# Patient Record
Sex: FEMALE | Race: WHITE | NOT HISPANIC OR LATINO | Employment: UNEMPLOYED | URBAN - METROPOLITAN AREA
[De-identification: names, ages, dates, MRNs, and addresses within clinical notes are randomized per-mention and may not be internally consistent; named-entity substitution may affect disease eponyms.]

---

## 2017-02-06 ENCOUNTER — ALLSCRIPTS OFFICE VISIT (OUTPATIENT)
Dept: OTHER | Facility: OTHER | Age: 62
End: 2017-02-06

## 2017-03-09 ENCOUNTER — APPOINTMENT (OUTPATIENT)
Dept: LAB | Facility: MEDICAL CENTER | Age: 62
End: 2017-03-09
Payer: COMMERCIAL

## 2017-03-09 DIAGNOSIS — F31.78 BIPOLAR DISORDER, IN FULL REMISSION, MOST RECENT EPISODE MIXED (HCC): ICD-10-CM

## 2017-03-09 DIAGNOSIS — Z79.899 OTHER LONG TERM (CURRENT) DRUG THERAPY: ICD-10-CM

## 2017-03-09 LAB
ALBUMIN SERPL BCP-MCNC: 3.6 G/DL (ref 3.5–5)
ALP SERPL-CCNC: 57 U/L (ref 46–116)
ALT SERPL W P-5'-P-CCNC: 23 U/L (ref 12–78)
ANION GAP SERPL CALCULATED.3IONS-SCNC: 8 MMOL/L (ref 4–13)
AST SERPL W P-5'-P-CCNC: 19 U/L (ref 5–45)
BASOPHILS # BLD AUTO: 0.05 THOUSANDS/ΜL (ref 0–0.1)
BASOPHILS NFR BLD AUTO: 1 % (ref 0–1)
BILIRUB SERPL-MCNC: 0.89 MG/DL (ref 0.2–1)
BUN SERPL-MCNC: 17 MG/DL (ref 5–25)
CALCIUM SERPL-MCNC: 8.7 MG/DL (ref 8.3–10.1)
CHLORIDE SERPL-SCNC: 100 MMOL/L (ref 100–108)
CHOLEST SERPL-MCNC: 162 MG/DL (ref 50–200)
CO2 SERPL-SCNC: 30 MMOL/L (ref 21–32)
CREAT SERPL-MCNC: 0.73 MG/DL (ref 0.6–1.3)
EOSINOPHIL # BLD AUTO: 0.16 THOUSAND/ΜL (ref 0–0.61)
EOSINOPHIL NFR BLD AUTO: 2 % (ref 0–6)
ERYTHROCYTE [DISTWIDTH] IN BLOOD BY AUTOMATED COUNT: 14.9 % (ref 11.6–15.1)
GFR SERPL CREATININE-BSD FRML MDRD: >60 ML/MIN/1.73SQ M
GLUCOSE SERPL-MCNC: 80 MG/DL (ref 65–140)
HCT VFR BLD AUTO: 39.1 % (ref 34.8–46.1)
HDLC SERPL-MCNC: 46 MG/DL (ref 40–60)
HGB BLD-MCNC: 12.5 G/DL (ref 11.5–15.4)
LDLC SERPL CALC-MCNC: 98 MG/DL (ref 0–100)
LYMPHOCYTES # BLD AUTO: 2.2 THOUSANDS/ΜL (ref 0.6–4.47)
LYMPHOCYTES NFR BLD AUTO: 32 % (ref 14–44)
MCH RBC QN AUTO: 27.4 PG (ref 26.8–34.3)
MCHC RBC AUTO-ENTMCNC: 32 G/DL (ref 31.4–37.4)
MCV RBC AUTO: 86 FL (ref 82–98)
MONOCYTES # BLD AUTO: 0.54 THOUSAND/ΜL (ref 0.17–1.22)
MONOCYTES NFR BLD AUTO: 8 % (ref 4–12)
NEUTROPHILS # BLD AUTO: 3.97 THOUSANDS/ΜL (ref 1.85–7.62)
NEUTS SEG NFR BLD AUTO: 57 % (ref 43–75)
NRBC BLD AUTO-RTO: 0 /100 WBCS
PLATELET # BLD AUTO: 242 THOUSANDS/UL (ref 149–390)
PMV BLD AUTO: 11.8 FL (ref 8.9–12.7)
POTASSIUM SERPL-SCNC: 3.9 MMOL/L (ref 3.5–5.3)
PROT SERPL-MCNC: 7.2 G/DL (ref 6.4–8.2)
RBC # BLD AUTO: 4.57 MILLION/UL (ref 3.81–5.12)
SODIUM SERPL-SCNC: 138 MMOL/L (ref 136–145)
TRIGL SERPL-MCNC: 89 MG/DL
VALPROATE SERPL-MCNC: 62 UG/ML (ref 50–100)
WBC # BLD AUTO: 6.93 THOUSAND/UL (ref 4.31–10.16)

## 2017-03-09 PROCEDURE — 85025 COMPLETE CBC W/AUTO DIFF WBC: CPT

## 2017-03-09 PROCEDURE — 36415 COLL VENOUS BLD VENIPUNCTURE: CPT

## 2017-03-09 PROCEDURE — 80053 COMPREHEN METABOLIC PANEL: CPT

## 2017-03-09 PROCEDURE — 80061 LIPID PANEL: CPT

## 2017-03-09 PROCEDURE — 80164 ASSAY DIPROPYLACETIC ACD TOT: CPT

## 2017-05-16 ENCOUNTER — ALLSCRIPTS OFFICE VISIT (OUTPATIENT)
Dept: OTHER | Facility: OTHER | Age: 62
End: 2017-05-16

## 2017-09-09 DIAGNOSIS — F31.78 BIPOLAR DISORDER, IN FULL REMISSION, MOST RECENT EPISODE MIXED (HCC): ICD-10-CM

## 2017-09-09 DIAGNOSIS — Z79.899 OTHER LONG TERM (CURRENT) DRUG THERAPY: ICD-10-CM

## 2017-09-26 ENCOUNTER — APPOINTMENT (OUTPATIENT)
Dept: LAB | Facility: MEDICAL CENTER | Age: 62
End: 2017-09-26
Payer: COMMERCIAL

## 2017-09-26 ENCOUNTER — TRANSCRIBE ORDERS (OUTPATIENT)
Dept: RADIOLOGY | Facility: MEDICAL CENTER | Age: 62
End: 2017-09-26

## 2017-09-26 ENCOUNTER — TRANSCRIBE ORDERS (OUTPATIENT)
Dept: LAB | Facility: MEDICAL CENTER | Age: 62
End: 2017-09-26

## 2017-09-26 DIAGNOSIS — F31.78 BIPOLAR DISORDER, IN FULL REMISSION, MOST RECENT EPISODE MIXED (HCC): ICD-10-CM

## 2017-09-26 DIAGNOSIS — Z79.899 OTHER LONG TERM (CURRENT) DRUG THERAPY: ICD-10-CM

## 2017-09-26 LAB
ALBUMIN SERPL BCP-MCNC: 3.5 G/DL (ref 3.5–5)
ALP SERPL-CCNC: 55 U/L (ref 46–116)
ALT SERPL W P-5'-P-CCNC: 22 U/L (ref 12–78)
ANION GAP SERPL CALCULATED.3IONS-SCNC: 7 MMOL/L (ref 4–13)
AST SERPL W P-5'-P-CCNC: 20 U/L (ref 5–45)
BASOPHILS # BLD AUTO: 0.06 THOUSANDS/ΜL (ref 0–0.1)
BASOPHILS NFR BLD AUTO: 1 % (ref 0–1)
BILIRUB SERPL-MCNC: 0.74 MG/DL (ref 0.2–1)
BUN SERPL-MCNC: 20 MG/DL (ref 5–25)
CALCIUM SERPL-MCNC: 8.9 MG/DL (ref 8.3–10.1)
CHLORIDE SERPL-SCNC: 100 MMOL/L (ref 100–108)
CHOLEST SERPL-MCNC: 172 MG/DL (ref 50–200)
CO2 SERPL-SCNC: 29 MMOL/L (ref 21–32)
CREAT SERPL-MCNC: 0.77 MG/DL (ref 0.6–1.3)
EOSINOPHIL # BLD AUTO: 0.17 THOUSAND/ΜL (ref 0–0.61)
EOSINOPHIL NFR BLD AUTO: 2 % (ref 0–6)
ERYTHROCYTE [DISTWIDTH] IN BLOOD BY AUTOMATED COUNT: 14.6 % (ref 11.6–15.1)
GFR SERPL CREATININE-BSD FRML MDRD: 84 ML/MIN/1.73SQ M
GLUCOSE P FAST SERPL-MCNC: 78 MG/DL (ref 65–99)
HCT VFR BLD AUTO: 38.6 % (ref 34.8–46.1)
HDLC SERPL-MCNC: 46 MG/DL (ref 40–60)
HGB BLD-MCNC: 12.5 G/DL (ref 11.5–15.4)
LDLC SERPL CALC-MCNC: 112 MG/DL (ref 0–100)
LYMPHOCYTES # BLD AUTO: 2.01 THOUSANDS/ΜL (ref 0.6–4.47)
LYMPHOCYTES NFR BLD AUTO: 28 % (ref 14–44)
MCH RBC QN AUTO: 28 PG (ref 26.8–34.3)
MCHC RBC AUTO-ENTMCNC: 32.4 G/DL (ref 31.4–37.4)
MCV RBC AUTO: 86 FL (ref 82–98)
MONOCYTES # BLD AUTO: 0.58 THOUSAND/ΜL (ref 0.17–1.22)
MONOCYTES NFR BLD AUTO: 8 % (ref 4–12)
NEUTROPHILS # BLD AUTO: 4.35 THOUSANDS/ΜL (ref 1.85–7.62)
NEUTS SEG NFR BLD AUTO: 61 % (ref 43–75)
NRBC BLD AUTO-RTO: 0 /100 WBCS
PLATELET # BLD AUTO: 234 THOUSANDS/UL (ref 149–390)
PMV BLD AUTO: 11.6 FL (ref 8.9–12.7)
POTASSIUM SERPL-SCNC: 3.9 MMOL/L (ref 3.5–5.3)
PROT SERPL-MCNC: 7.1 G/DL (ref 6.4–8.2)
RBC # BLD AUTO: 4.47 MILLION/UL (ref 3.81–5.12)
SODIUM SERPL-SCNC: 136 MMOL/L (ref 136–145)
TRIGL SERPL-MCNC: 70 MG/DL
VALPROATE SERPL-MCNC: 52 UG/ML (ref 50–100)
WBC # BLD AUTO: 7.18 THOUSAND/UL (ref 4.31–10.16)

## 2017-09-26 PROCEDURE — 80061 LIPID PANEL: CPT

## 2017-09-26 PROCEDURE — 36415 COLL VENOUS BLD VENIPUNCTURE: CPT

## 2017-09-26 PROCEDURE — 80164 ASSAY DIPROPYLACETIC ACD TOT: CPT

## 2017-09-26 PROCEDURE — 80053 COMPREHEN METABOLIC PANEL: CPT

## 2017-09-26 PROCEDURE — 85025 COMPLETE CBC W/AUTO DIFF WBC: CPT

## 2017-12-27 ENCOUNTER — ALLSCRIPTS OFFICE VISIT (OUTPATIENT)
Dept: OTHER | Facility: OTHER | Age: 62
End: 2017-12-27

## 2017-12-27 DIAGNOSIS — F31.78 BIPOLAR I DISORDER, MOST RECENT EPISODE MIXED, IN FULL REMISSION (HCC): Primary | ICD-10-CM

## 2018-01-10 NOTE — RESULT NOTES
Verified Results  (1) COMPREHENSIVE METABOLIC PANEL [id number] 76:16DV Santa Winters    Order Number: [id number] the patient been fasting for 10-12 hours? -NOT INDICATED     Test Name Result Flag Reference   SODIUM 134 mmol/L L 136-145   POTASSIUM 4 3 mmol/L  3 5-5 3   CHLORIDE 101 mmol/L  100-108   CARBON DIOXIDE 30 mmol/L  21-32   ANION GAP (CALC) 3 mmol/L L 4-13   BILI, TOTAL 0 56 mg/dL  0 20-1 00   TOTAL PROTEIN 7 1 g/dL  6 4-8 2   ALK PHOSPHATAS 59 U/L     ALT (SGPT) 19 U/L  12-78   AST(SGOT) 15 U/L  5-45   GLUCOSE,RANDM 89 mg/dL     If patient is fasting, the ADA then defines impaired fasting glucose as  >100 mg/dl and diabetes as  >or equal to 126 mg/dl  ALBUMIN 3 6 g/dL  3 5-5 0   BLOOD UREA NITROGEN 16 mg/dL  5-25   CALCIUM 8 9 mg/dL  8 3-10 1   CREATININE 0 71 mg/dL  0 60-1 30   Standardized to IDMS reference method   eGFR African American >60 ml/min/1 73sq m     [facility] Disease Education Program recommendations are as  follows:  GFR calculation is accurate only with a steady state creatinine  Chronic Kidney disease less than 60 ml/min/1 73 sq  meters  Kidney failure less than 15 ml/min/1 73 sq  meters  eGFR Non-African American >60 ml/min/1 73sq m       (1) CBC/PLT/DIFF 69EFL8446 02:24PM Santa Winters    Order Number: [id number]     Test Name Result Flag Reference   DIFFERENTIAL METHOD Automated     WBC COUNT 7 70 Thousand/uL  4 31-10 16   RBC COUNT 4 35 Million/uL  3 81-5 12   HEMOGLOBIN 12 2 g/dL  11 5-15 4   HEMATOCRIT 37 8 %  34 8-46  1   MCV 87 fL  82-98   MCH 28 0 pg  26 8-34 3   MCHC 32 3 g/dL  31 4-37 4   RDW 14 5 %  11 6-15 1   MPV 11 8 fL  8 9-12 7   PLATELET COUNT 269 Thousand/uL  149-390   nRBC AUTOMATED 0 /100 WBC     NEUTROPHILS RELATIVE PERCENT 60 %  43-75   LYMPHOCYTES RELATIVE PERCENT 26 %  14-44   MONOCYTES RELATIVE PERCENT 10 %  4-12   EOSINOPHILS RELATIVE PERCENT 3 %  0-6   BASOPHILS RELATIVE PERCENT 1 %  0-1   NEUTROPHILS ABSOLUTE COUNT 4 62 Thousand/uL  1 85-7 62   LYMPHOCYTES ABSOLUTE COUNT 2 00 Thousand/uL  0 60-4 47   MONOCYTES ABSOLUTE COUNT 0 77 Thousand/uL  0 17-1 22   EOSINOPHILS ABSOLUTE COUNT 0 23 Thousand/uL  0 00-0 61   BASOPHILS ABSOLUTE COUNT 0 08 Thousand/uL  0 00-0 10     (1) VALPROIC ACID (DEPAKOTE) 47DVQ4069 02:24PM Aubree Servant Order Number: [id number]     Test Name Result Flag Reference   VALPROIC ACID 57 mcg/mL

## 2018-01-13 VITALS
BODY MASS INDEX: 28.85 KG/M2 | RESPIRATION RATE: 16 BRPM | SYSTOLIC BLOOD PRESSURE: 130 MMHG | DIASTOLIC BLOOD PRESSURE: 64 MMHG | OXYGEN SATURATION: 98 % | WEIGHT: 201.5 LBS | HEIGHT: 70 IN | HEART RATE: 80 BPM | TEMPERATURE: 98.1 F
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
## 2018-01-22 VITALS
WEIGHT: 202 LBS | DIASTOLIC BLOOD PRESSURE: 74 MMHG | BODY MASS INDEX: 28.92 KG/M2 | HEIGHT: 70 IN | HEART RATE: 94 BPM | SYSTOLIC BLOOD PRESSURE: 122 MMHG

## 2018-01-23 VITALS
HEIGHT: 70 IN | DIASTOLIC BLOOD PRESSURE: 86 MMHG | BODY MASS INDEX: 28.63 KG/M2 | HEART RATE: 100 BPM | SYSTOLIC BLOOD PRESSURE: 138 MMHG | WEIGHT: 200 LBS
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
## 2018-03-26 DIAGNOSIS — F31.78 BIPOLAR DISORDER, IN FULL REMISSION, MOST RECENT EPISODE MIXED (HCC): ICD-10-CM

## 2018-03-26 DIAGNOSIS — Z79.899 OTHER LONG TERM (CURRENT) DRUG THERAPY: ICD-10-CM

## 2018-03-31 ENCOUNTER — APPOINTMENT (OUTPATIENT)
Dept: LAB | Facility: MEDICAL CENTER | Age: 63
End: 2018-03-31
Payer: COMMERCIAL

## 2018-03-31 DIAGNOSIS — Z79.899 OTHER LONG TERM (CURRENT) DRUG THERAPY: ICD-10-CM

## 2018-03-31 DIAGNOSIS — F31.78 BIPOLAR DISORDER, IN FULL REMISSION, MOST RECENT EPISODE MIXED (HCC): ICD-10-CM

## 2018-03-31 LAB
ALBUMIN SERPL BCP-MCNC: 3.5 G/DL (ref 3.5–5)
ALP SERPL-CCNC: 64 U/L (ref 46–116)
ALT SERPL W P-5'-P-CCNC: 16 U/L (ref 12–78)
ANION GAP SERPL CALCULATED.3IONS-SCNC: 5 MMOL/L (ref 4–13)
AST SERPL W P-5'-P-CCNC: 16 U/L (ref 5–45)
BASOPHILS # BLD AUTO: 0.08 THOUSANDS/ΜL (ref 0–0.1)
BASOPHILS NFR BLD AUTO: 1 % (ref 0–1)
BILIRUB SERPL-MCNC: 0.59 MG/DL (ref 0.2–1)
BUN SERPL-MCNC: 20 MG/DL (ref 5–25)
CALCIUM SERPL-MCNC: 8.6 MG/DL (ref 8.3–10.1)
CHLORIDE SERPL-SCNC: 100 MMOL/L (ref 100–108)
CO2 SERPL-SCNC: 30 MMOL/L (ref 21–32)
CREAT SERPL-MCNC: 0.69 MG/DL (ref 0.6–1.3)
EOSINOPHIL # BLD AUTO: 0.36 THOUSAND/ΜL (ref 0–0.61)
EOSINOPHIL NFR BLD AUTO: 7 % (ref 0–6)
ERYTHROCYTE [DISTWIDTH] IN BLOOD BY AUTOMATED COUNT: 14.3 % (ref 11.6–15.1)
GFR SERPL CREATININE-BSD FRML MDRD: 94 ML/MIN/1.73SQ M
GLUCOSE SERPL-MCNC: 67 MG/DL (ref 65–140)
HCT VFR BLD AUTO: 40.3 % (ref 34.8–46.1)
HGB BLD-MCNC: 12.7 G/DL (ref 11.5–15.4)
LYMPHOCYTES # BLD AUTO: 1.84 THOUSANDS/ΜL (ref 0.6–4.47)
LYMPHOCYTES NFR BLD AUTO: 33 % (ref 14–44)
MCH RBC QN AUTO: 27.7 PG (ref 26.8–34.3)
MCHC RBC AUTO-ENTMCNC: 31.5 G/DL (ref 31.4–37.4)
MCV RBC AUTO: 88 FL (ref 82–98)
MONOCYTES # BLD AUTO: 0.57 THOUSAND/ΜL (ref 0.17–1.22)
MONOCYTES NFR BLD AUTO: 10 % (ref 4–12)
NEUTROPHILS # BLD AUTO: 2.68 THOUSANDS/ΜL (ref 1.85–7.62)
NEUTS SEG NFR BLD AUTO: 49 % (ref 43–75)
NRBC BLD AUTO-RTO: 0 /100 WBCS
PLATELET # BLD AUTO: 211 THOUSANDS/UL (ref 149–390)
PMV BLD AUTO: 12.5 FL (ref 8.9–12.7)
POTASSIUM SERPL-SCNC: 4.1 MMOL/L (ref 3.5–5.3)
PROT SERPL-MCNC: 7.1 G/DL (ref 6.4–8.2)
RBC # BLD AUTO: 4.58 MILLION/UL (ref 3.81–5.12)
SODIUM SERPL-SCNC: 135 MMOL/L (ref 136–145)
VALPROATE SERPL-MCNC: 71 UG/ML (ref 50–100)
WBC # BLD AUTO: 5.54 THOUSAND/UL (ref 4.31–10.16)

## 2018-03-31 PROCEDURE — 80164 ASSAY DIPROPYLACETIC ACD TOT: CPT

## 2018-03-31 PROCEDURE — 80053 COMPREHEN METABOLIC PANEL: CPT

## 2018-03-31 PROCEDURE — 85025 COMPLETE CBC W/AUTO DIFF WBC: CPT

## 2018-03-31 PROCEDURE — 36415 COLL VENOUS BLD VENIPUNCTURE: CPT

## 2018-04-29 PROBLEM — H61.23 HEARING LOSS OF BOTH EARS DUE TO CERUMEN IMPACTION: Status: ACTIVE | Noted: 2017-02-06
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
## 2018-05-02 ENCOUNTER — OFFICE VISIT (OUTPATIENT)
Dept: PSYCHIATRY | Facility: CLINIC | Age: 63
End: 2018-05-02
Payer: COMMERCIAL

## 2018-05-02 VITALS
DIASTOLIC BLOOD PRESSURE: 66 MMHG | WEIGHT: 195 LBS | BODY MASS INDEX: 27.92 KG/M2 | SYSTOLIC BLOOD PRESSURE: 104 MMHG | HEART RATE: 77 BPM | HEIGHT: 70 IN

## 2018-05-02 DIAGNOSIS — F31.78 BIPOLAR I DISORDER, MOST RECENT EPISODE MIXED, IN FULL REMISSION (HCC): Primary | Chronic | ICD-10-CM

## 2018-05-02 DIAGNOSIS — G25.9 EXTRAPYRAMIDAL REACTION: Chronic | ICD-10-CM

## 2018-05-02 PROCEDURE — 99213 OFFICE O/P EST LOW 20 MIN: CPT | Performed by: PSYCHIATRY & NEUROLOGY

## 2018-05-02 RX ORDER — BENZTROPINE MESYLATE 1 MG/1
1 TABLET ORAL 2 TIMES DAILY
Qty: 180 TABLET | Refills: 2 | Status: SHIPPED | OUTPATIENT
Start: 2018-05-02 | End: 2018-09-13 | Stop reason: SDUPTHER

## 2018-05-02 RX ORDER — MULTIVITAMIN
1 TABLET ORAL DAILY
COMMUNITY

## 2018-05-02 RX ORDER — IBUPROFEN 200 MG
TABLET ORAL EVERY 6 HOURS PRN
COMMUNITY
End: 2019-01-10 | Stop reason: ALTCHOICE

## 2018-05-02 NOTE — PSYCH
TREATMENT PLAN (Medication Management Only)        [facility]    Name/Date of Birth/MRN:  Vasile Padron 65 y o  1955 MRN: [id number]  Date of Treatment Plan: May 2, 2018  Diagnosis/Diagnoses:   1  Bipolar I disorder, most recent episode mixed, in full remission ([facility] [address] )    2  Extrapyramidal reaction      Strengths/Personal Resources for Self-Care: "trying to eat and sleep well", taking medications as prescribed  Area/Areas of need (in own words): "things going well"  1  Long Term Goal: maintain mood stability  Target Date: 4 months - 9/2/2018  Person/Persons responsible for completion of goal: Den Padilla  2  Short Term Objective (s) - How will we reach this goal?:   A  Provider new recommended medication/dosage changes and/or continue medication(s): continue current medications as prescribed (Depakote ER, Risperdal and Cogentin)  B   N/A   C   N/A  Target Date: 4 months - 9/2/2018  Person/Persons Responsible for Completion of Goal: Den Padilla   Progress Towards Goals: stable  Treatment Modality: medication management every 4 months  Review due 90 to 120 days from date of this plan: 4 months - 9/2/2018  Expected length of service: maintenance  My Physician/PA/NP and I have developed this plan together and I agree to work on the goals and objectives  I understand the treatment goals that were developed for my treatment    Signature:       Date and time:  Signature of parent/guardian if under age of 15 years: Date and time:  Signature of provider:      Date and time:  Signature of Supervising Physician:    Date and time: 5/2/2018      Louis Monreal MD

## 2018-08-22 DIAGNOSIS — F31.78 BIPOLAR I DISORDER, MOST RECENT EPISODE MIXED, IN FULL REMISSION (HCC): Primary | Chronic | ICD-10-CM

## 2018-08-22 RX ORDER — DIVALPROEX SODIUM 500 MG/1
500 TABLET, EXTENDED RELEASE ORAL EVERY EVENING
Qty: 90 TABLET | Refills: 0 | Status: SHIPPED | OUTPATIENT
Start: 2018-08-22 | End: 2018-09-13 | Stop reason: SDUPTHER
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
## 2018-09-13 ENCOUNTER — OFFICE VISIT (OUTPATIENT)
Dept: PSYCHIATRY | Facility: CLINIC | Age: 63
End: 2018-09-13
Payer: COMMERCIAL

## 2018-09-13 VITALS
DIASTOLIC BLOOD PRESSURE: 69 MMHG | SYSTOLIC BLOOD PRESSURE: 110 MMHG | WEIGHT: 193 LBS | HEART RATE: 59 BPM | BODY MASS INDEX: 27.63 KG/M2 | HEIGHT: 70 IN

## 2018-09-13 DIAGNOSIS — G25.9 EXTRAPYRAMIDAL REACTION: Chronic | ICD-10-CM

## 2018-09-13 DIAGNOSIS — F31.78 BIPOLAR I DISORDER, MOST RECENT EPISODE MIXED, IN FULL REMISSION (HCC): Primary | Chronic | ICD-10-CM

## 2018-09-13 DIAGNOSIS — Z51.81 THERAPEUTIC DRUG MONITORING: Chronic | ICD-10-CM

## 2018-09-13 PROCEDURE — 99213 OFFICE O/P EST LOW 20 MIN: CPT | Performed by: PSYCHIATRY & NEUROLOGY

## 2018-09-13 PROCEDURE — 90833 PSYTX W PT W E/M 30 MIN: CPT | Performed by: PSYCHIATRY & NEUROLOGY

## 2018-09-13 RX ORDER — RISPERIDONE 1 MG/1
1 TABLET, FILM COATED ORAL EVERY EVENING
Qty: 30 TABLET | Refills: 4 | Status: SHIPPED | OUTPATIENT
Start: 2018-09-13 | End: 2019-01-10 | Stop reason: SDUPTHER

## 2018-09-13 RX ORDER — BENZTROPINE MESYLATE 1 MG/1
1 TABLET ORAL 2 TIMES DAILY
Qty: 60 TABLET | Refills: 4 | Status: SHIPPED | OUTPATIENT
Start: 2018-09-13 | End: 2019-01-10 | Stop reason: SDUPTHER

## 2018-09-13 RX ORDER — DIVALPROEX SODIUM 500 MG/1
500 TABLET, EXTENDED RELEASE ORAL EVERY EVENING
Qty: 30 TABLET | Refills: 4 | Status: SHIPPED | OUTPATIENT
Start: 2018-09-13 | End: 2019-01-10 | Stop reason: SDUPTHER

## 2018-09-13 NOTE — PSYCH
TREATMENT PLAN (Medication Management Only)        [facility]    Name/Date of Birth/MRN:  Vikram Grossman 65 y o  1955 MRN: [id number]  Date of Treatment Plan: September 13, 2018  Diagnosis/Diagnoses:   1  Bipolar I disorder, most recent episode mixed, in full remission ([facility] [address] )    2  Extrapyramidal reaction    3  Therapeutic drug monitoring      Strengths/Personal Resources for Self-Care: "medications, doctors visits all done on time"  Area/Areas of need (in own words): "continue medications, to stay healthy"  1  Long Term Goal: maintain mood stability  Target Date: 4 months - 1/13/2019  Person/Persons responsible for completion of goal: Anjel Tiwari  2  Short Term Objective (s) - How will we reach this goal?:   A  Provider new recommended medication/dosage changes and/or continue medication(s): continue current medications as prescribed (Depakote ER, Cogentin and Risperdal)  B   N/A   C   N/A  Target Date: 4 months - 1/13/2019  Person/Persons Responsible for Completion of Goal: Anjel Tiwari   Progress Towards Goals: stable  Treatment Modality: medication management every 4 months  Review due 90 to 120 days from date of this plan: 4 months - 1/13/2019  Expected length of service: maintenance  My Physician/PA/NP and I have developed this plan together and I agree to work on the goals and objectives  I understand the treatment goals that were developed for my treatment    Signature:       Date and time:  Signature of parent/guardian if under age of 15 years: Date and time:  Signature of provider:      Date and time:  Signature of Supervising Physician:    Date and time: 9/13/2018      Esther Hurley MD

## 2018-09-21 ENCOUNTER — DOCUMENTATION (OUTPATIENT)
Dept: PSYCHIATRY | Facility: CLINIC | Age: 63
End: 2018-09-21

## 2018-09-21 NOTE — PROGRESS NOTES
Treatment Plan not completed within required time limits due to :   Follow up appointment scheduled over the 120 days from [address] [place] [address] Rd 5/2/2018

## 2018-10-24 ENCOUNTER — IMMUNIZATION (OUTPATIENT)
Dept: FAMILY MEDICINE CLINIC | Facility: CLINIC | Age: 63
End: 2018-10-24
Payer: COMMERCIAL

## 2018-10-24 DIAGNOSIS — Z23 ENCOUNTER FOR IMMUNIZATION: ICD-10-CM

## 2018-10-24 PROCEDURE — 90471 IMMUNIZATION ADMIN: CPT

## 2018-10-24 PROCEDURE — 90682 RIV4 VACC RECOMBINANT DNA IM: CPT

## 2018-11-08 ENCOUNTER — APPOINTMENT (OUTPATIENT)
Dept: LAB | Facility: MEDICAL CENTER | Age: 63
End: 2018-11-08
Payer: COMMERCIAL

## 2018-11-08 DIAGNOSIS — Z51.81 THERAPEUTIC DRUG MONITORING: Chronic | ICD-10-CM

## 2018-11-08 DIAGNOSIS — F31.78 BIPOLAR I DISORDER, MOST RECENT EPISODE MIXED, IN FULL REMISSION (HCC): Chronic | ICD-10-CM

## 2018-11-08 LAB
ALBUMIN SERPL BCP-MCNC: 3.5 G/DL (ref 3.5–5)
ALP SERPL-CCNC: 53 U/L (ref 46–116)
ALT SERPL W P-5'-P-CCNC: 19 U/L (ref 12–78)
ANION GAP SERPL CALCULATED.3IONS-SCNC: 6 MMOL/L (ref 4–13)
AST SERPL W P-5'-P-CCNC: 16 U/L (ref 5–45)
BASOPHILS # BLD AUTO: 0.07 THOUSANDS/ΜL (ref 0–0.1)
BASOPHILS NFR BLD AUTO: 1 % (ref 0–1)
BILIRUB SERPL-MCNC: 0.98 MG/DL (ref 0.2–1)
BUN SERPL-MCNC: 25 MG/DL (ref 5–25)
CALCIUM SERPL-MCNC: 8.4 MG/DL (ref 8.3–10.1)
CHLORIDE SERPL-SCNC: 98 MMOL/L (ref 100–108)
CHOLEST SERPL-MCNC: 156 MG/DL (ref 50–200)
CO2 SERPL-SCNC: 28 MMOL/L (ref 21–32)
CREAT SERPL-MCNC: 0.75 MG/DL (ref 0.6–1.3)
EOSINOPHIL # BLD AUTO: 0.2 THOUSAND/ΜL (ref 0–0.61)
EOSINOPHIL NFR BLD AUTO: 3 % (ref 0–6)
ERYTHROCYTE [DISTWIDTH] IN BLOOD BY AUTOMATED COUNT: 14.5 % (ref 11.6–15.1)
GFR SERPL CREATININE-BSD FRML MDRD: 86 ML/MIN/1.73SQ M
GLUCOSE P FAST SERPL-MCNC: 70 MG/DL (ref 65–99)
HCT VFR BLD AUTO: 40.8 % (ref 34.8–46.1)
HDLC SERPL-MCNC: 42 MG/DL (ref 40–60)
HGB BLD-MCNC: 13.1 G/DL (ref 11.5–15.4)
IMM GRANULOCYTES # BLD AUTO: 0.02 THOUSAND/UL (ref 0–0.2)
IMM GRANULOCYTES NFR BLD AUTO: 0 % (ref 0–2)
LDLC SERPL CALC-MCNC: 97 MG/DL (ref 0–100)
LYMPHOCYTES # BLD AUTO: 1.78 THOUSANDS/ΜL (ref 0.6–4.47)
LYMPHOCYTES NFR BLD AUTO: 29 % (ref 14–44)
MCH RBC QN AUTO: 28 PG (ref 26.8–34.3)
MCHC RBC AUTO-ENTMCNC: 32.1 G/DL (ref 31.4–37.4)
MCV RBC AUTO: 87 FL (ref 82–98)
MONOCYTES # BLD AUTO: 0.47 THOUSAND/ΜL (ref 0.17–1.22)
MONOCYTES NFR BLD AUTO: 8 % (ref 4–12)
NEUTROPHILS # BLD AUTO: 3.54 THOUSANDS/ΜL (ref 1.85–7.62)
NEUTS SEG NFR BLD AUTO: 59 % (ref 43–75)
NONHDLC SERPL-MCNC: 114 MG/DL
NRBC BLD AUTO-RTO: 0 /100 WBCS
PLATELET # BLD AUTO: 171 THOUSANDS/UL (ref 149–390)
PMV BLD AUTO: 11.9 FL (ref 8.9–12.7)
POTASSIUM SERPL-SCNC: 4.1 MMOL/L (ref 3.5–5.3)
PROT SERPL-MCNC: 7.1 G/DL (ref 6.4–8.2)
RBC # BLD AUTO: 4.68 MILLION/UL (ref 3.81–5.12)
SODIUM SERPL-SCNC: 132 MMOL/L (ref 136–145)
TRIGL SERPL-MCNC: 84 MG/DL
VALPROATE SERPL-MCNC: 54 UG/ML (ref 50–100)
WBC # BLD AUTO: 6.08 THOUSAND/UL (ref 4.31–10.16)

## 2018-11-08 PROCEDURE — 80053 COMPREHEN METABOLIC PANEL: CPT

## 2018-11-08 PROCEDURE — 80164 ASSAY DIPROPYLACETIC ACD TOT: CPT

## 2018-11-08 PROCEDURE — 80061 LIPID PANEL: CPT

## 2018-11-08 PROCEDURE — 85025 COMPLETE CBC W/AUTO DIFF WBC: CPT

## 2018-11-08 PROCEDURE — 36415 COLL VENOUS BLD VENIPUNCTURE: CPT

## 2018-11-15 ENCOUNTER — OFFICE VISIT (OUTPATIENT)
Dept: FAMILY MEDICINE CLINIC | Facility: CLINIC | Age: 63
End: 2018-11-15
Payer: COMMERCIAL

## 2018-11-15 ENCOUNTER — HOSPITAL ENCOUNTER (INPATIENT)
Facility: HOSPITAL | Age: 63
LOS: 1 days | Discharge: HOME | DRG: 310 | End: 2018-11-16
Attending: EMERGENCY MEDICINE | Admitting: INTERNAL MEDICINE
Payer: COMMERCIAL

## 2018-11-15 VITALS
DIASTOLIC BLOOD PRESSURE: 82 MMHG | HEIGHT: 70 IN | SYSTOLIC BLOOD PRESSURE: 128 MMHG | WEIGHT: 199.6 LBS | BODY MASS INDEX: 28.58 KG/M2 | TEMPERATURE: 97.6 F | HEART RATE: 112 BPM | OXYGEN SATURATION: 98 %

## 2018-11-15 DIAGNOSIS — Z12.39 SCREENING FOR BREAST CANCER: ICD-10-CM

## 2018-11-15 DIAGNOSIS — R31.0 GROSS HEMATURIA: ICD-10-CM

## 2018-11-15 DIAGNOSIS — I48.91 ATRIAL FIBRILLATION WITH RAPID VENTRICULAR RESPONSE (HCC): Primary | ICD-10-CM

## 2018-11-15 DIAGNOSIS — E87.1 HYPONATREMIA: Primary | ICD-10-CM

## 2018-11-15 DIAGNOSIS — I48.91 ATRIAL FIBRILLATION, UNSPECIFIED TYPE (HCC): ICD-10-CM

## 2018-11-15 DIAGNOSIS — R00.0 TACHYCARDIA: ICD-10-CM

## 2018-11-15 DIAGNOSIS — I48.91 ATRIAL FIBRILLATION WITH RVR (HCC): ICD-10-CM

## 2018-11-15 PROBLEM — H61.23 HEARING LOSS OF BOTH EARS DUE TO CERUMEN IMPACTION: Status: RESOLVED | Noted: 2017-02-06 | Resolved: 2018-11-15

## 2018-11-15 PROBLEM — Z12.11 SCREEN FOR COLON CANCER: Status: ACTIVE | Noted: 2018-11-15

## 2018-11-15 PROBLEM — R31.9 HEMATURIA: Status: ACTIVE | Noted: 2018-11-15

## 2018-11-15 LAB
ALBUMIN SERPL BCP-MCNC: 4.1 G/DL (ref 3.5–5)
ALP SERPL-CCNC: 63 U/L (ref 46–116)
ALT SERPL W P-5'-P-CCNC: 28 U/L (ref 12–78)
ANION GAP SERPL CALCULATED.3IONS-SCNC: 10 MMOL/L (ref 4–13)
AST SERPL W P-5'-P-CCNC: 20 U/L (ref 5–45)
BASOPHILS # BLD AUTO: 0.08 THOUSANDS/ΜL (ref 0–0.1)
BASOPHILS NFR BLD AUTO: 1 % (ref 0–1)
BILIRUB SERPL-MCNC: 0.7 MG/DL (ref 0.2–1)
BUN SERPL-MCNC: 21 MG/DL (ref 5–25)
CALCIUM SERPL-MCNC: 9.9 MG/DL (ref 8.3–10.1)
CHLORIDE SERPL-SCNC: 100 MMOL/L (ref 100–108)
CO2 SERPL-SCNC: 30 MMOL/L (ref 21–32)
CREAT SERPL-MCNC: 0.89 MG/DL (ref 0.6–1.3)
EOSINOPHIL # BLD AUTO: 0.22 THOUSAND/ΜL (ref 0–0.61)
EOSINOPHIL NFR BLD AUTO: 3 % (ref 0–6)
ERYTHROCYTE [DISTWIDTH] IN BLOOD BY AUTOMATED COUNT: 14.6 % (ref 11.6–15.1)
GFR SERPL CREATININE-BSD FRML MDRD: 70 ML/MIN/1.73SQ M
GLUCOSE SERPL-MCNC: 91 MG/DL (ref 65–140)
HCT VFR BLD AUTO: 41.8 % (ref 34.8–46.1)
HGB BLD-MCNC: 13.6 G/DL (ref 11.5–15.4)
IMM GRANULOCYTES # BLD AUTO: 0.03 THOUSAND/UL (ref 0–0.2)
IMM GRANULOCYTES NFR BLD AUTO: 0 % (ref 0–2)
LYMPHOCYTES # BLD AUTO: 2.39 THOUSANDS/ΜL (ref 0.6–4.47)
LYMPHOCYTES NFR BLD AUTO: 31 % (ref 14–44)
MAGNESIUM SERPL-MCNC: 1.9 MG/DL (ref 1.6–2.6)
MCH RBC QN AUTO: 28.4 PG (ref 26.8–34.3)
MCHC RBC AUTO-ENTMCNC: 32.5 G/DL (ref 31.4–37.4)
MCV RBC AUTO: 87 FL (ref 82–98)
MONOCYTES # BLD AUTO: 0.61 THOUSAND/ΜL (ref 0.17–1.22)
MONOCYTES NFR BLD AUTO: 8 % (ref 4–12)
NEUTROPHILS # BLD AUTO: 4.51 THOUSANDS/ΜL (ref 1.85–7.62)
NEUTS SEG NFR BLD AUTO: 57 % (ref 43–75)
NRBC BLD AUTO-RTO: 0 /100 WBCS
PLATELET # BLD AUTO: 203 THOUSANDS/UL (ref 149–390)
PMV BLD AUTO: 11.2 FL (ref 8.9–12.7)
POTASSIUM SERPL-SCNC: 3.9 MMOL/L (ref 3.5–5.3)
PROT SERPL-MCNC: 8 G/DL (ref 6.4–8.2)
RBC # BLD AUTO: 4.79 MILLION/UL (ref 3.81–5.12)
SL AMB  POCT GLUCOSE, UA: ABNORMAL
SL AMB LEUKOCYTE ESTERASE,UA: ABNORMAL
SL AMB POCT BILIRUBIN,UA: ABNORMAL
SL AMB POCT BLOOD,UA: ABNORMAL
SL AMB POCT CLARITY,UA: ABNORMAL
SL AMB POCT COLOR,UA: YELLOW
SL AMB POCT KETONES,UA: ABNORMAL
SL AMB POCT NITRITE,UA: ABNORMAL
SL AMB POCT PH,UA: 6
SL AMB POCT SPECIFIC GRAVITY,UA: 1.01
SL AMB POCT URINE PROTEIN: ABNORMAL
SL AMB POCT UROBILINOGEN: ABNORMAL
SODIUM SERPL-SCNC: 140 MMOL/L (ref 136–145)
TROPONIN I SERPL-MCNC: <0.02 NG/ML
WBC # BLD AUTO: 7.84 THOUSAND/UL (ref 4.31–10.16)

## 2018-11-15 PROCEDURE — 96360 HYDRATION IV INFUSION INIT: CPT

## 2018-11-15 PROCEDURE — 93000 ELECTROCARDIOGRAM COMPLETE: CPT | Performed by: PHYSICIAN ASSISTANT

## 2018-11-15 PROCEDURE — 87086 URINE CULTURE/COLONY COUNT: CPT | Performed by: PHYSICIAN ASSISTANT

## 2018-11-15 PROCEDURE — 81002 URINALYSIS NONAUTO W/O SCOPE: CPT | Performed by: PHYSICIAN ASSISTANT

## 2018-11-15 PROCEDURE — 85025 COMPLETE CBC W/AUTO DIFF WBC: CPT | Performed by: EMERGENCY MEDICINE

## 2018-11-15 PROCEDURE — 80053 COMPREHEN METABOLIC PANEL: CPT | Performed by: EMERGENCY MEDICINE

## 2018-11-15 PROCEDURE — 99214 OFFICE O/P EST MOD 30 MIN: CPT | Performed by: PHYSICIAN ASSISTANT

## 2018-11-15 PROCEDURE — 94760 N-INVAS EAR/PLS OXIMETRY 1: CPT

## 2018-11-15 PROCEDURE — 99285 EMERGENCY DEPT VISIT HI MDM: CPT

## 2018-11-15 PROCEDURE — 94664 DEMO&/EVAL PT USE INHALER: CPT

## 2018-11-15 PROCEDURE — 1111F DSCHRG MED/CURRENT MED MERGE: CPT | Performed by: PHYSICIAN ASSISTANT

## 2018-11-15 PROCEDURE — 84484 ASSAY OF TROPONIN QUANT: CPT | Performed by: EMERGENCY MEDICINE

## 2018-11-15 PROCEDURE — 83735 ASSAY OF MAGNESIUM: CPT | Performed by: NURSE PRACTITIONER

## 2018-11-15 PROCEDURE — 93005 ELECTROCARDIOGRAM TRACING: CPT

## 2018-11-15 PROCEDURE — 36415 COLL VENOUS BLD VENIPUNCTURE: CPT | Performed by: EMERGENCY MEDICINE

## 2018-11-15 PROCEDURE — 99223 1ST HOSP IP/OBS HIGH 75: CPT | Performed by: INTERNAL MEDICINE

## 2018-11-15 RX ORDER — RISPERIDONE 1 MG/1
1 TABLET, FILM COATED ORAL EVERY EVENING
Status: DISCONTINUED | OUTPATIENT
Start: 2018-11-15 | End: 2018-11-16 | Stop reason: HOSPADM

## 2018-11-15 RX ORDER — SODIUM CHLORIDE 9 MG/ML
50 INJECTION, SOLUTION INTRAVENOUS ONCE
Status: DISCONTINUED | OUTPATIENT
Start: 2018-11-15 | End: 2018-11-16 | Stop reason: HOSPADM

## 2018-11-15 RX ORDER — ASCORBIC ACID 500 MG
500 TABLET ORAL DAILY
Status: DISCONTINUED | OUTPATIENT
Start: 2018-11-16 | End: 2018-11-16 | Stop reason: HOSPADM

## 2018-11-15 RX ORDER — BENZTROPINE MESYLATE 1 MG/1
1 TABLET ORAL 2 TIMES DAILY
Status: DISCONTINUED | OUTPATIENT
Start: 2018-11-15 | End: 2018-11-16 | Stop reason: HOSPADM

## 2018-11-15 RX ORDER — ASCORBIC ACID 500 MG
500 TABLET ORAL DAILY
COMMUNITY

## 2018-11-15 RX ORDER — DIVALPROEX SODIUM 500 MG/1
500 TABLET, EXTENDED RELEASE ORAL EVERY EVENING
Status: DISCONTINUED | OUTPATIENT
Start: 2018-11-15 | End: 2018-11-16 | Stop reason: HOSPADM

## 2018-11-15 RX ORDER — ACETAMINOPHEN 325 MG/1
650 TABLET ORAL EVERY 6 HOURS PRN
Status: DISCONTINUED | OUTPATIENT
Start: 2018-11-15 | End: 2018-11-16 | Stop reason: HOSPADM

## 2018-11-15 RX ORDER — ONDANSETRON 2 MG/ML
4 INJECTION INTRAMUSCULAR; INTRAVENOUS EVERY 6 HOURS PRN
Status: DISCONTINUED | OUTPATIENT
Start: 2018-11-15 | End: 2018-11-16 | Stop reason: HOSPADM

## 2018-11-15 RX ADMIN — DIVALPROEX SODIUM 500 MG: 500 TABLET, EXTENDED RELEASE ORAL at 17:43

## 2018-11-15 RX ADMIN — BENZTROPINE MESYLATE 1 MG: 1 TABLET ORAL at 18:00

## 2018-11-15 RX ADMIN — METOPROLOL TARTRATE 25 MG: 25 TABLET ORAL at 20:25

## 2018-11-15 RX ADMIN — METOPROLOL TARTRATE 25 MG: 25 TABLET ORAL at 14:51

## 2018-11-15 RX ADMIN — SODIUM CHLORIDE 1000 ML: 0.9 INJECTION, SOLUTION INTRAVENOUS at 13:43

## 2018-11-15 RX ADMIN — RISPERIDONE 1 MG: 1 TABLET ORAL at 17:43

## 2018-11-15 RX ADMIN — ENOXAPARIN SODIUM 90 MG: 100 INJECTION SUBCUTANEOUS at 17:43

## 2018-11-15 NOTE — ED PROVIDER NOTES
History  Chief Complaint   Patient presents with    Abnormal ECG     pt went for lab work and told had low Na, PCP obtained EKG in office and told to come to ER after abnormal EKG     67-year-old female presents for evaluation with chief complaint of an abnormal EKG identified on a outpatient EKG  Patient reports she had been feeling tired and fatigued with past several weeks and had some outpatient blood work that had shown some hyponatremia  Patient is actually following up with her family doctor due to this abnormal blood work when the EKG was obtained which showed atrial fibrillation with a rapid ventricular response  Patient does not have any past medical history of this disorder  Patient denies chest pain but does report shortness of breath with exertion  History provided by:  Patient   used: No    Shortness of Breath   Severity:  Mild  Onset quality:  Gradual  Duration:  2 weeks  Timing:  Constant  Progression:  Unchanged  Chronicity:  New  Context: activity    Relieved by:  Rest  Worsened by:  Exertion  Ineffective treatments:  None tried  Associated symptoms: no chest pain, no diaphoresis, no fever, no rash, no vomiting and no wheezing        Prior to Admission Medications   Prescriptions Last Dose Informant Patient Reported? Taking?    Multiple Vitamin (MULTI-VITAMIN DAILY) TABS 11/15/2018 at Unknown time Self Yes Yes   Sig: Take 1 tablet by mouth daily   ascorbic acid (VITAMIN C) 500 mg tablet 11/14/2018 at Unknown time Self Yes Yes   Sig: Take 500 mg by mouth daily   benztropine (COGENTIN) 1 mg tablet 11/15/2018 at Unknown time Self No Yes   Sig: Take 1 tablet (1 mg total) by mouth 2 (two) times a day for 150 days   divalproex sodium (DEPAKOTE ER) 500 mg 24 hr tablet 11/14/2018 at Unknown time Self No Yes   Sig: Take 1 tablet (500 mg total) by mouth every evening for 150 days   ibuprofen (MOTRIN) 200 mg tablet 11/14/2018 at Unknown time Self Yes Yes   Sig: Take by mouth every 6 (six) hours as needed for mild pain   risperiDONE (RisperDAL) 1 mg tablet 2018 at Unknown time Self No Yes   Sig: Take 1 tablet (1 mg total) by mouth every evening for 150 days      Facility-Administered Medications: None       Past Medical History:   Diagnosis Date    Psychiatric disorder     bipolar    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    Varicose veins of legs        Past Surgical History:   Procedure Laterality Date     SECTION         Family History   Problem Relation Age of Onset    Psychiatric Illness Maternal Aunt     Breast cancer Maternal Aunt     Stroke Mother         CVA    Heart failure Mother     Diabetes Mother     Hypertension Mother     Thyroid disease Mother     Gallbladder disease Mother         gallstones    Diabetes Brother     Multiple sclerosis Brother     Diabetes Maternal Grandmother     Breast cancer Cousin      I have reviewed and agree with the history as documented  Social History   Substance Use Topics    Smoking status: Former Smoker    Smokeless tobacco: Never Used    Alcohol use No        Review of Systems   Constitutional: Positive for fatigue  Negative for chills, diaphoresis and fever  Respiratory: Positive for shortness of breath  Negative for wheezing  Cardiovascular: Negative for chest pain and palpitations  Gastrointestinal: Negative for diarrhea, nausea and vomiting  Genitourinary: Negative for dysuria and frequency  Skin: Negative for rash  Neurological: Negative for weakness  All other systems reviewed and are negative  Physical Exam  Physical Exam   Constitutional: She is oriented to person, place, and time  She appears well-developed and well-nourished  She appears distressed (mild)  HENT:   Head: Normocephalic and atraumatic  Eyes: Pupils are equal, round, and reactive to light  EOM are normal    Neck: Normal range of motion  No JVD present     Cardiovascular: Regular rhythm, normal heart sounds and intact distal pulses  Tachycardia present  Exam reveals no gallop and no friction rub  No murmur heard  Pulmonary/Chest: Effort normal and breath sounds normal  No respiratory distress  She has no wheezes  She has no rales  She exhibits no tenderness  Musculoskeletal: Normal range of motion  She exhibits edema (trace bilateral)  She exhibits no tenderness  Neurological: She is alert and oriented to person, place, and time  Skin: Skin is warm and dry  Psychiatric: She has a normal mood and affect  Her behavior is normal  Judgment and thought content normal    Nursing note and vitals reviewed        Vital Signs  ED Triage Vitals   Temperature Pulse Respirations Blood Pressure SpO2   11/15/18 1404 11/15/18 1332 11/15/18 1332 11/15/18 1345 11/15/18 1332   98 2 °F (36 8 °C) (!) 107 18 162/95 98 %      Temp Source Heart Rate Source Patient Position - Orthostatic VS BP Location FiO2 (%)   11/15/18 1404 11/15/18 1332 11/15/18 1332 11/15/18 1332 --   Oral Monitor Lying Right arm       Pain Score       11/15/18 1332       No Pain           Vitals:    11/15/18 1451 11/15/18 1530 11/15/18 1729 11/15/18 1853   BP: 137/89 154/84 158/84    Pulse: (!) 106 88 83 82   Patient Position - Orthostatic VS:  Lying Sitting        Visual Acuity      ED Medications  Medications   metoprolol tartrate (LOPRESSOR) tablet 25 mg (25 mg Oral Given 11/15/18 2025)   ascorbic acid (VITAMIN C) tablet 500 mg (not administered)   benztropine (COGENTIN) tablet 1 mg (1 mg Oral Given 11/15/18 1800)   divalproex sodium (DEPAKOTE ER) 24 hr tablet 500 mg (500 mg Oral Given 11/15/18 1743)   risperiDONE (RisperDAL) tablet 1 mg (1 mg Oral Given 11/15/18 1743)   ondansetron (ZOFRAN) injection 4 mg (not administered)   sodium chloride 0 9 % infusion (0 mL/hr Intravenous Stopped 11/15/18 1801)   acetaminophen (TYLENOL) tablet 650 mg (not administered)   sodium chloride 0 9 % bolus 1,000 mL (0 mL Intravenous Stopped 11/15/18 1443)   metoprolol tartrate (LOPRESSOR) tablet 25 mg (25 mg Oral Given 11/15/18 1451)   enoxaparin (LOVENOX) subcutaneous injection 90 mg (90 mg Subcutaneous Given 11/15/18 9873)       Diagnostic Studies  Results Reviewed     Procedure Component Value Units Date/Time    Magnesium [[id number]]  (Normal) Collected:  11/15/18 1342    Lab Status:  Final result Specimen:  Blood from Arm, Left Updated:  11/15/18 1813     Magnesium 1 9 mg/dL     Troponin I [[id number]]  (Normal) Collected:  11/15/18 1342    Lab Status:  Final result Specimen:  Blood from Arm, Left Updated:  11/15/18 1408     Troponin I <0 02 ng/mL     Comprehensive metabolic panel [[id number]] Collected:  11/15/18 1342    Lab Status:  Final result Specimen:  Blood from Arm, Left Updated:  11/15/18 1406     Sodium 140 mmol/L      Potassium 3 9 mmol/L      Chloride 100 mmol/L      CO2 30 mmol/L      ANION GAP 10 mmol/L      BUN 21 mg/dL      Creatinine 0 89 mg/dL      Glucose 91 mg/dL      Calcium 9 9 mg/dL      AST 20 U/L      ALT 28 U/L      Alkaline Phosphatase 63 U/L      Total Protein 8 0 g/dL      Albumin 4 1 g/dL      Total Bilirubin 0 70 mg/dL      eGFR 70 ml/min/1 73sq m     Narrative:         National Kidney Disease Education Program recommendations are as follows:  GFR calculation is accurate only with a steady state creatinine  Chronic Kidney disease less than 60 ml/min/1 73 sq  meters  Kidney failure less than 15 ml/min/1 73 sq  meters      CBC and differential [[id number]] Collected:  11/15/18 1342    Lab Status:  Final result Specimen:  Blood from Arm, Left Updated:  11/15/18 1350     WBC 7 84 Thousand/uL      RBC 4 79 Million/uL      Hemoglobin 13 6 g/dL      Hematocrit 41 8 %      MCV 87 fL      MCH 28 4 pg      MCHC 32 5 g/dL      RDW 14 6 %      MPV 11 2 fL      Platelets 176 Thousands/uL      nRBC 0 /100 WBCs      Neutrophils Relative 57 %      Immat GRANS % 0 %      Lymphocytes Relative 31 %      Monocytes Relative 8 %      Eosinophils Relative 3 %      Basophils Relative 1 %      Neutrophils Absolute 4 51 Thousands/µL      Immature Grans Absolute 0 03 Thousand/uL      Lymphocytes Absolute 2 39 Thousands/µL      Monocytes Absolute 0 61 Thousand/µL      Eosinophils Absolute 0 22 Thousand/µL      Basophils Absolute 0 08 Thousands/µL                  No orders to display              Procedures  ECG 12 Lead Documentation  Date/Time: 11/15/2018 1:46 PM  Performed by: Lukas Davenport by: Marley TRUJILLO     Indications / Diagnosis:  Palpitations  ECG reviewed by me, the ED Provider: yes    Patient location:  ED  Previous ECG:     Previous ECG:  Unavailable  Interpretation:     Interpretation: abnormal    Rate:     ECG rate:  108    ECG rate assessment: tachycardic    Rhythm:     Rhythm: atrial fibrillation    Ectopy:     Ectopy: PVCs      PVCs:  Frequent  QRS:     QRS axis:  Normal  Conduction:     Conduction: normal    ST segments:     ST segments:  Normal  T waves:     T waves: normal             Phone Contacts  ED Phone Contact    ED Course                               MDM  Number of Diagnoses or Management Options  Atrial fibrillation with rapid ventricular response ([address] ): new and requires workup  Diagnosis management comments: Background: 65 y o  female presents with shortness of breath, new onset atrial fibrillation    Differential DX includes but is not limited to: new onset atrial fibrillation, metabolic derangement, doubt acs    Plan: admission due to CHADS2 score and length of symptoms (2weeks)          Amount and/or Complexity of Data Reviewed  Clinical lab tests: ordered and reviewed    Risk of Complications, Morbidity, and/or Mortality  Presenting problems: high  Diagnostic procedures: high  Management options: high    Patient Progress  Patient progress: stable    CritCare Time    Disposition  Final diagnoses:   Atrial fibrillation with rapid ventricular response ([address] )     Time reflects when diagnosis was documented in both MDM as applicable and the Disposition within this note     Time User Action Codes Description Comment    11/15/2018  3:13 PM Henrietta Buerger Add [I48 91] Atrial fibrillation with rapid ventricular response [facility])       ED Disposition     ED Disposition Condition Comment    Admit  Case was discussed with Dr Kailyn Pierre and the patient's admission status was agreed to be Admission Status: inpatient status to the service of Dr Kailyn Pierre   Follow-up Information    None         Current Discharge Medication List      CONTINUE these medications which have NOT CHANGED    Details   ascorbic acid (VITAMIN C) 500 mg tablet Take 500 mg by mouth daily      benztropine (COGENTIN) 1 mg tablet Take 1 tablet (1 mg total) by mouth 2 (two) times a day for 150 days  Qty: 60 tablet, Refills: 4    Associated Diagnoses: Extrapyramidal reaction      divalproex sodium (DEPAKOTE ER) 500 mg 24 hr tablet Take 1 tablet (500 mg total) by mouth every evening for 150 days  Qty: 30 tablet, Refills: 4    Associated Diagnoses: Bipolar I disorder, most recent episode mixed, in full remission ([facility])      ibuprofen (MOTRIN) 200 mg tablet Take by mouth every 6 (six) hours as needed for mild pain      Multiple Vitamin (MULTI-VITAMIN DAILY) TABS Take 1 tablet by mouth daily      risperiDONE (RisperDAL) 1 mg tablet Take 1 tablet (1 mg total) by mouth every evening for 150 days  Qty: 30 tablet, Refills: 4    Associated Diagnoses: Bipolar I disorder, most recent episode mixed, in full remission ([facility] [address] )           No discharge procedures on file      ED Provider  Electronically Signed by           Sally Colunga MD  11/15/18 2459

## 2018-11-15 NOTE — H&P
H&P- Jeison Lincoln 1955, 65 y o  female MRN: [id number]    Unit/Bed#: [facility] Encounter: [id number]    Primary Care Provider: Merlin Toussaint MD   Date and time admitted to hospital: 11/15/2018  1:30 PM        * Atrial fibrillation with RVR ([address] )   Assessment & Plan    Note noted to be in new onset atrial fibrillation today while at her primary care provider's office  Referred to emergency department for further evaluation  Heart rate 107 bpm in ED  EKG atrial fibrillation with RVR,   Initial troponin < 0 02  Status post metoprolol 25 mg po X1 in ED  · Continue metoprolol 25 mg BID with hold parameters  · Obtain echocardiogram  · Check TSH, Mag level  · Cardiology consultation  · Continuous telemetry monitoring for new onset atrial fibrillation  · Will give Lovenox 1 milligram/kilogram subcu x1 tonight for anticoagulation therapy as CHADS2 score : 1 due to hypertension  · Patient notes that she does not have a history of hypertension  She may just have some anxiety while in the ED  · Will defer further anticoagulation to Cardiology     Hematuria   Assessment & Plan    Reports a questionable episode of hematuria 2 months ago followed by a true episode about a week and a half ago  Patient noted bright red blood in the toilet bowl for 2 days  She did not have any blood on her tissue paper  She denies any history of hemorrhoids  She sought care with her PCP for hematuria    Urinalysis was sent which was positive for trace blood  Follow up final urine culture and monitor for further hematuria       Hyponatremia   Assessment & Plan    History of hyponatremia, following with her PCP as outpatient  Presently, Na : 140, however, this may be due to hemoconcentration as patient appears dehydrated and has not had anything to eat or drink today in light of new onset atrial fibrillation  S/p NSS 1 liter in ED  · Will continue gentle IV hydration, NS at 50 mL/hr x1 liter  · Monitor Na in am     Bipolar I disorder, most recent episode mixed, in full remission [facility])   [address] outpatient psychiatry  Continue home regimen, Cogentin, Depakote, Risperidone  Recent Valproic Acid 11/2018: 54            VTE Prophylaxis: Enoxaparin (Lovenox)  / reason for no mechanical VTE prophylaxis Ambulatory on Lovenox   Code Status:  Full code, level 1  POLST: POLST form is not discussed and not completed at this time  Discussion with family:   at bedside    Anticipated Length of Stay:  Patient will be admitted on an Inpatient basis with an anticipated length of stay of  greater than 2 midnights  Justification for Hospital Stay:  New onset atrial fibrillation with RVR    Total Time for Visit, including Counseling / Coordination of Care: 45 minutes  Greater than 50% of this total time spent on direct patient counseling and coordination of care  Chief Complaint:   Sent by PCP for new onset atrial fibrillation    History of Present Illness:    Zahraa Fernandez is a 65 y o  female with a past medical history including bipolar 1 disorder and hyponatremia who presents with new onset atrial fibrillation with RVR  Patient was at her primary care provider's office today to follow-up abnormal outpatient blood work  The patient's psychiatrist ordered routine screening and patient was found to have hyponatremia and hypochloremia  During her evaluation, patient was found to be in atrial fibrillation with RVR  She was sent to the emergency department for further evaluation and treatment  She denies any recent illness, changes in medications, or thyroid disorder  She does report a history of hematuria about a week and half ago for which she had bright red bleeding her toilet bowl for 2 days which spontaneously resolved  A urinalysis was sent and her primary care provider's office which is positive for trace blood  We will follow up the final urine culture    Patient reports a headache, palpitations, and intermittent shortness of breath  She denies chest pain, chest tightness, abdominal pain, nausea, vomiting, diarrhea  Patient denies a prior history of hypertension  Review of Systems:    Review of Systems   Constitutional: Negative for appetite change, chills and fever  HENT: Negative for congestion, postnasal drip, rhinorrhea and sore throat  Eyes: Negative for photophobia and visual disturbance  Respiratory: Positive for shortness of breath  Negative for cough, chest tightness and wheezing  Cardiovascular: Positive for palpitations  Negative for chest pain and leg swelling  Gastrointestinal: Negative for abdominal distention, abdominal pain, constipation, diarrhea, nausea and vomiting  Genitourinary: Positive for hematuria  Negative for difficulty urinating and dysuria  Musculoskeletal: Negative for arthralgias, gait problem and myalgias  Skin: Negative for rash and wound  Neurological: Negative for dizziness, weakness, light-headedness, numbness and headaches  Psychiatric/Behavioral: Negative for confusion  The patient is not nervous/anxious  Past Medical and Surgical History:     Past Medical History:   Diagnosis Date    Psychiatric disorder     bipolar    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  Authorizing Provider   ascorbic acid (VITAMIN C) 500 mg tablet Take 500 mg by mouth daily   Yes Historical Provider, MD   benztropine (COGENTIN) 1 mg tablet Take 1 tablet (1 mg total) by mouth 2 (two) times a day for 150 days 9/13/18 2/10/19 Yes Sonny Malone MD   divalproex sodium (DEPAKOTE ER) 500 mg 24 hr tablet Take 1 tablet (500 mg total) by mouth every evening for 150 days 9/13/18 2/10/19 Yes Sonny Malone MD   ibuprofen (MOTRIN) 200 mg tablet Take by mouth every 6 (six) hours as needed for mild pain   Yes Historical Provider, MD   Multiple Vitamin (MULTI-VITAMIN DAILY) TABS Take 1 tablet by mouth daily   Yes Historical Provider, MD   risperiDONE (RisperDAL) 1 mg tablet Take 1 tablet (1 mg total) by mouth every evening for 150 days 9/13/18 2/10/19 Yes Princess Ovalle MD     I have reviewed home medications with patient personally  Allergies: Allergies   Allergen Reactions    Levaquin [Levofloxacin]      "heavy legs"    Ampicillin Rash     Category: Allergy;     Clindamycin Rash     Category: Allergy;        Social History:     Marital Status: /Civil Union   Patient Pre-hospital Living Situation:  Home with   Patient Pre-hospital Level of Mobility:  Independent  Patient Pre-hospital Diet Restrictions:  None  Substance Use History:   History   Alcohol Use No     History   Smoking Status    Former Smoker   Smokeless Tobacco    Never Used     History   Drug Use No     Comment: History of cannabis use years ago  No current recreational drug use       Family History:    Family History   Problem Relation Age of Onset    Psychiatric Illness Maternal Aunt     Breast cancer Maternal Aunt     Stroke Mother         CVA    Heart failure Mother     Diabetes Mother     Hypertension Mother     Thyroid disease Mother     Gallbladder disease Mother         gallstones    Diabetes Brother     Multiple sclerosis Brother     Diabetes Maternal Grandmother     Breast cancer Cousin        Physical Exam:     Vitals:   Blood Pressure: 154/84 (11/15/18 1530)  Pulse: 88 (11/15/18 1530)  Temperature: 98 2 °F (36 8 °C) (11/15/18 1404)  Temp Source: Oral (11/15/18 1404)  Respirations: 18 (11/15/18 1530)  Weight - Scale: 90 3 kg (199 lb) (11/15/18 1332)  SpO2: 97 % (11/15/18 1530)    Physical Exam   Constitutional: She is oriented to person, place, and time  She appears well-developed and well-nourished  No distress  HENT:   Head: Normocephalic     Neck: Normal range of motion  Cardiovascular: Normal rate  A regularly irregular rhythm present  Pulmonary/Chest: Effort normal and breath sounds normal  No respiratory distress  She has no wheezes  She has no rhonchi  She has no rales  Abdominal: Soft  Bowel sounds are normal  She exhibits no distension  There is no tenderness  Musculoskeletal: Normal range of motion  She exhibits no edema or tenderness  Neurological: She is alert and oriented to person, place, and time  Skin: Skin is warm and dry  She is not diaphoretic  Psychiatric: Her speech is normal and behavior is normal  Judgment normal  Her mood appears anxious  Cognition and memory are normal    Nursing note and vitals reviewed  Additional Data:     Lab Results: I have personally reviewed pertinent reports  Results from last 7 days  Lab Units 11/15/18  1342   WBC Thousand/uL 7 84   HEMOGLOBIN g/dL 13 6   HEMATOCRIT % 41 8   PLATELETS Thousands/uL 203   NEUTROS ABS Thousands/µL 4 51   NEUTROS PCT % 57   LYMPHS PCT % 31   MONOS PCT % 8   EOS PCT % 3       Results from last 7 days  Lab Units 11/15/18  1342   POTASSIUM mmol/L 3 9   CHLORIDE mmol/L 100   CO2 mmol/L 30   BUN mg/dL 21   CREATININE mg/dL 0 89   ANION GAP mmol/L 10   CALCIUM mg/dL 9 9   ALBUMIN g/dL 4 1   TOTAL BILIRUBIN mg/dL 0 70   ALK PHOS U/L 63   ALT U/L 28   AST U/L 20                       Imaging: I have personally reviewed pertinent reports  No orders to display       EKG, Pathology, and Other Studies Reviewed on Admission:   · EKG:  Atrial fibrillation with RVR    Allscripts / Epic Records Reviewed: Yes     ** Please Note: This note has been constructed using a voice recognition system   **

## 2018-11-15 NOTE — ASSESSMENT & PLAN NOTE
Note noted to be in new onset atrial fibrillation today while at her primary care provider's office  Referred to emergency department for further evaluation  Heart rate 107 bpm in ED  EKG atrial fibrillation with RVR,   Initial troponin < 0 02  Status post metoprolol 25 mg po X1 in ED  · Continue metoprolol 25 mg BID with hold parameters  · Obtain echocardiogram  · Check TSH, Mag level  · Cardiology consultation  · Continuous telemetry monitoring for new onset atrial fibrillation  · Will give Lovenox 1 milligram/kilogram subcu x1 tonight for anticoagulation therapy as CHADS2 score : 1 due to hypertension  · Patient notes that she does not have a history of hypertension  She may just have some anxiety while in the ED    · Will defer further anticoagulation to Cardiology

## 2018-11-15 NOTE — ASSESSMENT & PLAN NOTE
Reports a questionable episode of hematuria 2 months ago followed by a true episode about a week and a half ago  Patient noted bright red blood in the toilet bowl for 2 days  She did not have any blood on her tissue paper  She denies any history of hemorrhoids  She sought care with her PCP for hematuria    Urinalysis was sent which was positive for trace blood  Follow up final urine culture and monitor for further hematuria

## 2018-11-15 NOTE — PROGRESS NOTES
Assessment/Plan:     Diagnoses and all orders for this visit:    Hyponatremia  -     Basic metabolic panel    Tachycardia  Comments:  EKG done in office is abnormal and shows patient in atrial fib  Orders:  -     POCT ECG    Gross hematuria  Comments:  Urine culture shows microscopic hematuria  Will send urine for culture await results  Orders:  -     POCT urine dip  -     Urine culture    Screening for breast cancer  -     Mammo screening bilateral w 3d & cad; Future    Atrial fibrillation, unspecified type [facility])  Comments:  Patient sent to the emergency room  Orders:  -     Ambulatory Referral to Emergency Medicine; Future          Subjective:      Patient ID: Wayne Vasquez is a 65 y o  female  Patient presents for follow-up abnormal labs  Patient's psychiatrist ordered some routine screening and she was found to have decreased sodium and decrease chloride level  Labs reviewed showed normal CBC  Sodium at 1:32 a m  Chloride at 98  Hepatic functions were normal fasting blood sugar was normal and her lipid profile was normal   Patient states last month she had episode of abdominal pain and gross hematuria in her urine  Patient had similar episode last week as well  No rectal bleeding no vaginal bleeding  All symptoms have since resolved  The following portions of the patient's history were reviewed and updated as appropriate:   She  has a past medical history of Retina disorder and Varicose veins of legs    She   Patient Active Problem List    Diagnosis Date Noted    Screening for breast cancer 11/15/2018    Screen for colon cancer 11/15/2018    Hyponatremia 11/15/2018    Atrial fibrillation ([facility] ) 11/15/2018    Therapeutic drug monitoring 09/13/2018    Extrapyramidal reaction 05/02/2018    Bipolar I disorder, most recent episode mixed, in full remission ([facility] ) 06/24/2013     Current Outpatient Prescriptions   Medication Sig Dispense Refill    benztropine (COGENTIN) 1 mg tablet Take 1 tablet (1 mg total) by mouth 2 (two) times a day for 150 days 60 tablet 4    divalproex sodium (DEPAKOTE ER) 500 mg 24 hr tablet Take 1 tablet (500 mg total) by mouth every evening for 150 days 30 tablet 4    ibuprofen (MOTRIN) 200 mg tablet Take by mouth every 6 (six) hours as needed for mild pain      Multiple Vitamin (MULTI-VITAMIN DAILY) TABS Take 1 tablet by mouth daily      risperiDONE (RisperDAL) 1 mg tablet Take 1 tablet (1 mg total) by mouth every evening for 150 days 30 tablet 4     No current facility-administered medications for this visit  Current Outpatient Prescriptions on File Prior to Visit   Medication Sig    benztropine (COGENTIN) 1 mg tablet Take 1 tablet (1 mg total) by mouth 2 (two) times a day for 150 days    divalproex sodium (DEPAKOTE ER) 500 mg 24 hr tablet Take 1 tablet (500 mg total) by mouth every evening for 150 days    ibuprofen (MOTRIN) 200 mg tablet Take by mouth every 6 (six) hours as needed for mild pain    Multiple Vitamin (MULTI-VITAMIN DAILY) TABS Take 1 tablet by mouth daily    risperiDONE (RisperDAL) 1 mg tablet Take 1 tablet (1 mg total) by mouth every evening for 150 days     No current facility-administered medications on file prior to visit  She is allergic to levaquin [levofloxacin]; ampicillin; and clindamycin       Review of Systems   Constitutional: Negative for activity change, appetite change and fever  Respiratory: Negative for cough and shortness of breath  Cardiovascular: Negative for chest pain  Gastrointestinal: Negative for abdominal pain and blood in stool  Genitourinary: Negative for difficulty urinating, frequency, hematuria and vaginal bleeding  Musculoskeletal: Negative for back pain  Objective:        Physical Exam   Constitutional: She is oriented to person, place, and time  She appears well-developed and well-nourished  No distress  HENT:   Head: Normocephalic and atraumatic     Right Ear: Tympanic membrane, external ear and ear canal normal    Left Ear: Tympanic membrane, external ear and ear canal normal    Mouth/Throat: Oropharynx is clear and moist  No oropharyngeal exudate or posterior oropharyngeal erythema  Eyes: Pupils are equal, round, and reactive to light  Conjunctivae are normal    Neck: Normal range of motion  Neck supple  Carotid bruit is not present  No thyromegaly present  Cardiovascular: Regular rhythm and S1 normal   Tachycardia present  Exam reveals no gallop and no friction rub  No murmur heard  Pulmonary/Chest: Effort normal and breath sounds normal  No respiratory distress  She has no wheezes  Abdominal: Soft  Bowel sounds are normal  She exhibits no distension and no mass  There is no tenderness  There is no rebound and no guarding  Musculoskeletal: Normal range of motion  She exhibits no edema  Lymphadenopathy:     She has no cervical adenopathy  Neurological: She is alert and oriented to person, place, and time  Skin: Skin is warm and dry  No rash noted  She is not diaphoretic  No erythema  Psychiatric: She has a normal mood and affect  Her behavior is normal    Nursing note and vitals reviewed

## 2018-11-15 NOTE — ASSESSMENT & PLAN NOTE
History of hyponatremia, following with her PCP as outpatient  Presently, Na : 140, however, this may be due to hemoconcentration as patient appears dehydrated and has not had anything to eat or drink today in light of new onset atrial fibrillation  S/p NSS 1 liter in ED  · Will continue gentle IV hydration, NS at 50 mL/hr x1 liter  · Monitor Na in am

## 2018-11-16 ENCOUNTER — APPOINTMENT (INPATIENT)
Dept: NON INVASIVE DIAGNOSTICS | Facility: HOSPITAL | Age: 63
DRG: 310 | End: 2018-11-16
Payer: COMMERCIAL

## 2018-11-16 VITALS
HEIGHT: 70 IN | BODY MASS INDEX: 27.93 KG/M2 | HEART RATE: 112 BPM | RESPIRATION RATE: 16 BRPM | OXYGEN SATURATION: 98 % | SYSTOLIC BLOOD PRESSURE: 112 MMHG | WEIGHT: 195.11 LBS | TEMPERATURE: 97.6 F | DIASTOLIC BLOOD PRESSURE: 80 MMHG

## 2018-11-16 LAB
ANION GAP SERPL CALCULATED.3IONS-SCNC: 8 MMOL/L (ref 4–13)
ATRIAL RATE: 208 BPM
BUN SERPL-MCNC: 19 MG/DL (ref 5–25)
CALCIUM SERPL-MCNC: 8.9 MG/DL (ref 8.3–10.1)
CHLORIDE SERPL-SCNC: 105 MMOL/L (ref 100–108)
CO2 SERPL-SCNC: 28 MMOL/L (ref 21–32)
CREAT SERPL-MCNC: 0.93 MG/DL (ref 0.6–1.3)
ERYTHROCYTE [DISTWIDTH] IN BLOOD BY AUTOMATED COUNT: 14.7 % (ref 11.6–15.1)
GFR SERPL CREATININE-BSD FRML MDRD: 67 ML/MIN/1.73SQ M
GLUCOSE SERPL-MCNC: 94 MG/DL (ref 65–140)
HCT VFR BLD AUTO: 40 % (ref 34.8–46.1)
HGB BLD-MCNC: 13 G/DL (ref 11.5–15.4)
MAGNESIUM SERPL-MCNC: 2 MG/DL (ref 1.6–2.6)
MCH RBC QN AUTO: 28.3 PG (ref 26.8–34.3)
MCHC RBC AUTO-ENTMCNC: 32.5 G/DL (ref 31.4–37.4)
MCV RBC AUTO: 87 FL (ref 82–98)
PLATELET # BLD AUTO: 203 THOUSANDS/UL (ref 149–390)
PMV BLD AUTO: 10.9 FL (ref 8.9–12.7)
POTASSIUM SERPL-SCNC: 4.2 MMOL/L (ref 3.5–5.3)
QRS AXIS: 60 DEGREES
QRSD INTERVAL: 68 MS
QT INTERVAL: 324 MS
QTC INTERVAL: 434 MS
RBC # BLD AUTO: 4.6 MILLION/UL (ref 3.81–5.12)
SODIUM SERPL-SCNC: 141 MMOL/L (ref 136–145)
T WAVE AXIS: 63 DEGREES
TSH SERPL DL<=0.05 MIU/L-ACNC: 2.38 UIU/ML (ref 0.36–3.74)
VENTRICULAR RATE: 108 BPM
WBC # BLD AUTO: 5.22 THOUSAND/UL (ref 4.31–10.16)

## 2018-11-16 PROCEDURE — 84443 ASSAY THYROID STIM HORMONE: CPT | Performed by: NURSE PRACTITIONER

## 2018-11-16 PROCEDURE — 93306 TTE W/DOPPLER COMPLETE: CPT

## 2018-11-16 PROCEDURE — 83735 ASSAY OF MAGNESIUM: CPT | Performed by: NURSE PRACTITIONER

## 2018-11-16 PROCEDURE — 93010 ELECTROCARDIOGRAM REPORT: CPT | Performed by: INTERNAL MEDICINE

## 2018-11-16 PROCEDURE — 99222 1ST HOSP IP/OBS MODERATE 55: CPT | Performed by: INTERNAL MEDICINE

## 2018-11-16 PROCEDURE — 80048 BASIC METABOLIC PNL TOTAL CA: CPT | Performed by: NURSE PRACTITIONER

## 2018-11-16 PROCEDURE — 85027 COMPLETE CBC AUTOMATED: CPT | Performed by: NURSE PRACTITIONER

## 2018-11-16 PROCEDURE — 99239 HOSP IP/OBS DSCHRG MGMT >30: CPT | Performed by: NURSE PRACTITIONER

## 2018-11-16 PROCEDURE — 93306 TTE W/DOPPLER COMPLETE: CPT | Performed by: INTERNAL MEDICINE

## 2018-11-16 RX ORDER — METOPROLOL TARTRATE 50 MG/1
50 TABLET, FILM COATED ORAL EVERY 12 HOURS SCHEDULED
Qty: 60 TABLET | Refills: 1 | Status: SHIPPED | OUTPATIENT
Start: 2018-11-16 | End: 2018-12-12 | Stop reason: SDUPTHER

## 2018-11-16 RX ORDER — METOPROLOL TARTRATE 5 MG/5ML
5 INJECTION INTRAVENOUS EVERY 6 HOURS PRN
Status: DISCONTINUED | OUTPATIENT
Start: 2018-11-16 | End: 2018-11-16 | Stop reason: HOSPADM

## 2018-11-16 RX ORDER — METOPROLOL TARTRATE 50 MG/1
50 TABLET, FILM COATED ORAL EVERY 12 HOURS SCHEDULED
Status: DISCONTINUED | OUTPATIENT
Start: 2018-11-16 | End: 2018-11-16 | Stop reason: HOSPADM

## 2018-11-16 RX ADMIN — METOPROLOL TARTRATE 25 MG: 25 TABLET ORAL at 16:19

## 2018-11-16 RX ADMIN — METOPROLOL TARTRATE 5 MG: 1 INJECTION, SOLUTION INTRAVENOUS at 07:33

## 2018-11-16 RX ADMIN — OXYCODONE HYDROCHLORIDE AND ACETAMINOPHEN 500 MG: 500 TABLET ORAL at 10:00

## 2018-11-16 RX ADMIN — METOPROLOL TARTRATE 25 MG: 25 TABLET ORAL at 10:00

## 2018-11-16 RX ADMIN — BENZTROPINE MESYLATE 1 MG: 1 TABLET ORAL at 10:00

## 2018-11-16 NOTE — ASSESSMENT & PLAN NOTE
History of hyponatremia, following with her PCP as outpatient  · Presently, Na : 140 -> 141 with IVF  · Outpatient PCP follow-up

## 2018-11-16 NOTE — UTILIZATION REVIEW
[address]  Utilization Review Department  Phone: [phone]; Fax [phone]  [email or web address]  ATTENTION: Please call with any questions or concerns to [phone]  and carefully listen to the prompts so that you are directed to the right person  Send all requests for admission clinical reviews, approved or denied determinations and any other requests to fax [phone]  All voicemails are confidential   Initial Clinical Review    Admission: Date/Time/Statement: inpatient 11/15/18 1544    Orders Placed This Encounter   Procedures    Inpatient Admission (expected length of stay for this patient is greater than two midnights)     Standing Status:   Standing     Number of Occurrences:   1     Order Specific Question:   Admitting Physician     Answer:   Jose Luis Howell [[id number]]     Order Specific Question:   Level of Care     Answer:   Med Surg [16]     Order Specific Question:   Estimated length of stay     Answer:   More than 2 Midnights     Order Specific Question:   Certification     Answer:   I certify that inpatient services are medically necessary for this patient for a duration of greater than two midnights  See H&P and MD Progress Notes for additional information about the patient's course of treatment  ED: Date/Time/Mode of Arrival:   ED Arrival Information     Expected Arrival Acuity Means of Arrival Escorted By Service Admission Type    - 11/15/2018 13:20 Urgent Walk-In Family Member General Medicine Urgent    Arrival Complaint    hyponatremia, abnormal labs          Chief Complaint:   Chief Complaint   Patient presents with    Abnormal ECG     pt went for lab work and told had low Na, PCP obtained EKG in office and told to come to ER after abnormal EKG       History of Illness: 65 y o  Female PMHx of bipolar disorder sent by PCP for labs & abnormal EKG  She reports feeling fatigued & tired past several weeks   Other symptoms include headache, palpitations with intermittent shortness of breath  Sent to ED for further evaluation  She does report a history of hematuria about a week and half ago for which she had bright red bleeding her toilet bowl for 2 days which spontaneously resolved  A urinalysis was sent and her primary care provider's office which is positive for trace blood  ED Vital Signs:   ED Triage Vitals   Temperature Pulse Respirations Blood Pressure SpO2   11/15/18 1404 11/15/18 1332 11/15/18 1332 11/15/18 1345 11/15/18 1332   98 2 °F (36 8 °C) (!) 107 18 162/95 98 %      Temp Source Heart Rate Source Patient Position - Orthostatic VS BP Location FiO2 (%)   11/15/18 1404 11/15/18 1332 11/15/18 1332 11/15/18 1332 --   Oral Monitor Lying Right arm       Pain Score       11/15/18 1332       No Pain        Wt Readings from Last 1 Encounters:   11/15/18 88 5 kg (195 lb 1 7 oz)       Vital Signs (abnormal): 11/15/2018 ,106    11/16/2018 , 112    Abnormal Labs/Diagnostic Test Results: 11/15 urinalysis blood trace,   EKG: ECG rate:  108    ECG rate assessment: tachycardic    Rhythm:     Rhythm: atrial fibrillation    Ectopy:     Ectopy: PVCs      PVCs:  Frequent  QRS:     QRS axis:  Normal  Conduction:     Conduction: normal    ST segments:     ST segments:  Normal  T waves:     T waves: normal        ED Treatment:   Medication Administration from 11/15/2018 1320 to 11/15/2018 1652       Date/Time Order Dose Route Action Comments     11/15/2018 1443 sodium chloride 0 9 % bolus 1,000 mL 0 mL Intravenous Stopped      11/15/2018 1343 sodium chloride 0 9 % bolus 1,000 mL 1,000 mL Intravenous New Bag      11/15/2018 1451 metoprolol tartrate (LOPRESSOR) tablet 25 mg 25 mg Oral Given           Past Medical/Surgical History:    Active Ambulatory Problems     Diagnosis Date Noted    Bipolar I disorder, most recent episode mixed, in full remission ([facility] ) 06/24/2013    Extrapyramidal reaction 05/02/2018    Therapeutic drug monitoring 09/13/2018    Screening for breast cancer 11/15/2018    Screen for colon cancer 11/15/2018    Hyponatremia 11/15/2018    Atrial fibrillation with RVR ([address] ) 11/15/2018       Past Medical History:   Diagnosis Date    Psychiatric disorder     Retina disorder     Varicose veins of legs        Admitting Diagnosis: Abnormal ECG [R94 31]  Atrial fibrillation with rapid ventricular response ([facility] ) [I48 91]    Age/Sex: 65 y o  female    Assessment/Plan: 11/15/2018 65 y o  Female found to be in atrial fibrillation while at her PCP office, history of fatigued, headache  Palpitations with intermittent shortness of breath  Obtain EKG, ECHO, trend trop, CBC&diff, chem panel, urinalysis, TSH, MAG maintain telemetry  Continue Metoprolol (in ED PO x1) with parameters & x1 dose Lovenox for anticoagulation  [address] Cardiology input  Hx of hematuria 2 months ago & 1 week ago  Urinalysis trace for blood-urine culture  IV hydration for hyponatremia, BMP in am  Continue Bipolar medications         Admission Orders:  Scheduled Meds:   Current Facility-Administered Medications:  acetaminophen 650 mg Oral Q6H PRN    ascorbic acid 500 mg Oral Daily    benztropine 1 mg Oral BID    divalproex sodium 500 mg Oral QPM    metoprolol 5 mg Intravenous Q6H PRN    metoprolol tartrate 25 mg Oral Q12H RUPERTO    ondansetron 4 mg Intravenous Q6H PRN    risperiDONE 1 mg Oral QPM    sodium chloride 50 mL/hr Intravenous Once Last Rate: Stopped (11/15/18 1801)     Continuous Infusions:    PRN Meds:   48 hr telemetry  Peripheral IV  ECHO Inpatient to Cardiology  Regular diet

## 2018-11-16 NOTE — ASSESSMENT & PLAN NOTE
Reports a questionable episode of hematuria 2 months ago followed by a true episode about a week and a half ago  Patient noted bright red blood in the toilet bowl for 2 days  She did not have any blood on her tissue paper  She denies any history of hemorrhoids  She sought care with her PCP for hematuria      Urinalysis was sent which was positive for trace blood  Final urine culture pending, follow-up with PCP and monitor for further hematuria  Pt will require 4 weeks of anticoagulation therapy in preparation for outpatient cardioversion - cardiology will initaite anticoagulation once hematuria workup is complete

## 2018-11-16 NOTE — DISCHARGE SUMMARY
Discharge- Abran Milligan 1955, 65 y o  female MRN: [id number]    Unit/Bed#: -01 Encounter: [id number]    Primary Care Provider: Marlene Bustillo MD   Date and time admitted to hospital: 11/15/2018  1:30 PM        * Atrial fibrillation with RVR ([address] )   Assessment & Plan    Note noted to be in new onset atrial fibrillation today while at her primary care provider's office  Referred to emergency department for further evaluation  Heart rate 82 - 133 bpm since admission   EKG atrial fibrillation with RVR,   Initial troponin < 0 02  Status post metoprolol 25 mg po X1 in ED  · Echocardiogram within normal limits   · TSH within normal limits   · Cardiology consultation appreciated  · Discharge home on Metoprolol 50 mg BID and Eliquis 5 mg BID  · Will need cardiology follow-up for outpatient cardioversion   Hematuria   Assessment & Plan    Reports a questionable episode of hematuria 2 months ago followed by a true episode about a week and a half ago  Patient noted bright red blood in the toilet bowl for 2 days  She did not have any blood on her tissue paper  She denies any history of hemorrhoids  She sought care with her PCP for hematuria      Urinalysis was sent which was positive for trace blood  Final urine culture pending, follow-up with PCP and monitor for further hematuria  Monitor for further hematuria    Hyponatremia   Assessment & Plan    History of hyponatremia, following with her PCP as outpatient  · Presently, Na : 140 -> 141 with IVF  · Outpatient PCP follow-up      Bipolar I disorder, most recent episode mixed, in full remission [facility])   [facility] outpatient psychiatry  Continue home regimen, Cogentin, Depakote, Risperidone           Discharging Physician / Practitioner: CINDY Srivastava  PCP: Marlene Bustillo MD  Admission Date:   Admission Orders     Ordered        11/15/18 1546  Inpatient Admission (expected length of stay for this patient is greater than two midnights)  Once             Discharge Date: 11/16/18    Resolved Problems  Date Reviewed: 11/16/2018    None          Consultations During Hospital Stay:  · Cardiology - Dr Kelsey Hillman     Procedures Performed:     ECHO: LVEF 65%, no regional wall motion abnormalities  LEFT ATRIUM, mildly dilated  RIGHT ATRIUM, mildly dilated, MITRAL VALVE mild annular calcification and trace regurgitation  TRICUSPID VALVE, mild regurgitation  EKG: Atrial fibrillation with RVR    Significant Findings / Test Results:     · As above     Incidental Findings:   · None     Test Results Pending at Discharge (will require follow up):   · Urine culture      Outpatient Tests Requested:  · None    Complications:  None    Reason for Admission: New onset AFib with rVr    Hospital Course:     Siddharth Hamlin is a 65 y o  female patient with a PMH including Bipolar 1 disorder who originally presented to the hospital on 11/15/2018 due to referral from her PCP for new on-set Atrial Fibrillation with rVr  Workup in the ED confirmed PCP's finding  Patient was given Metoprolol 25 mg with rate improvement  TSH was within normal limits as was her echocardiogram      She is willing to undergo an outpatient cardioversion for which she will follow-up with cardiology to schedule  Patient will be discharged on Metoprolol 50 mg PO BID and Eliquis 5 mg BID, She will monitor for any bleeding including hematuria  She will follow-up outpatient with cardiology to schedule an outpatient cardioversion  Please see above list of diagnoses and related plan for additional information  Condition at Discharge: stable     Discharge Day Visit / Exam:     Subjective:  Overall, patient feels much improved  Denies shortness of breath or chest pain  No further hematuria  Anxious for discharge home stating she keeps walking around her room and the hallways     Vitals: Blood Pressure: 105/65 (11/16/18 1000)  Pulse: (!) 112 (11/16/18 1000)  Temperature: 97 6 °F (36 4 °C) (11/15/18 2211)  Temp Source: Oral (11/15/18 2211)  Respirations: 16 (11/15/18 2211)  Height: 5' 10" (177 8 cm) (11/15/18 1729)  Weight - Scale: 88 5 kg (195 lb 1 7 oz) (11/15/18 1729)  SpO2: 98 % (11/15/18 2211)  Exam:   Physical Exam   Constitutional: She is oriented to person, place, and time  She appears well-developed  No distress  HENT:   Head: Normocephalic  Neck: Normal range of motion  Cardiovascular: Normal rate  An irregularly irregular rhythm present  Pulmonary/Chest: Effort normal and breath sounds normal  No respiratory distress  She has no wheezes  She has no rhonchi  She has no rales  Abdominal: Soft  Bowel sounds are normal  She exhibits no distension  There is no tenderness  Musculoskeletal: Normal range of motion  She exhibits no edema or tenderness  Neurological: She is alert and oriented to person, place, and time  Skin: Skin is warm and dry  She is not diaphoretic  Psychiatric: She has a normal mood and affect  Her behavior is normal  Judgment and thought content normal    Nursing note and vitals reviewed  Discussion with Family:      Discharge instructions/Information to patient and family:   See after visit summary for information provided to patient and family  Provisions for Follow-Up Care:  See after visit summary for information related to follow-up care and any pertinent home health orders  Disposition:     Home    For Discharges to [facility] SNF:   · Not Applicable to this Patient - Not Applicable to this Patient    Planned Readmission: None     Discharge Statement:  I spent > 30 minutes discharging the patient  This time was spent on the day of discharge  I had direct contact with the patient on the day of discharge  Greater than 50% of the total time was spent examining patient, answering all patient questions, arranging and discussing plan of care with patient as well as directly providing post-discharge instructions  Additional time then spent on discharge activities  Coordination with cardiology  Discharge Medications:  See after visit summary for reconciled discharge medications provided to patient and family        ** Please Note: This note has been constructed using a voice recognition system **

## 2018-11-16 NOTE — PLAN OF CARE
CARDIOVASCULAR - ADULT     Maintains optimal cardiac output and hemodynamic stability Progressing        DISCHARGE PLANNING     Discharge to home or other facility with appropriate resources Progressing        Knowledge Deficit     Patient/family/caregiver demonstrates understanding of disease process, treatment plan, medications, and discharge instructions Progressing

## 2018-11-16 NOTE — CONSULTS
Consultation - Cardiology   Denzel Quinonez 65 y o  female MRN: [id number]  Unit/Bed#: -01 Encounter: [id number]    Assessment/Plan     Principal Problem:    Atrial fibrillation with RVR ([facility] )  Active Problems:    Bipolar I disorder, most recent episode mixed, in full remission ([facility] )    Hyponatremia    Hematuria      Assessment/Plan    1  Atrial fibrillation with RVR-diagnosis  TSH normal  Echocardiogram pending  Mildly symptomatic  Her chads Vasc/stroke risk is quite low for long-term anticoagulation  She is 65 and this is her 1st episode of atrial fibrillation  She has been having some worsening shortness of breath and activity intolerance  She has been given Lopressor for rate control with improvement of her heart rate  She did however require an IV dose of Lopressor this morning and she became tachycardic getting out of bed  At this point I think we should continue the Lopressor  We may need to increase the dose for better heart rate control  Will ask her to ambulate to assess HR with activity  She may need 50mg BID  She is open to being cardioverted to hopefully restore sinus rhythm  We need to sort through her hematuria first to assure that we can put her at least on short-term anticoagulation prior to cardioversion  If we can get her rate controlled I think we can probably set her up for an outpatient cardioversion  If she is on at least 4 weeks of faithful anticoagulation will not need WALESKA otherwise we will need one  She did receive 1 time dose of Lovenox 90 mg last evening  2  Hematuria-workup in process  CBC normal  UA mildly abnormal, Urine culture pending  Need to w/u hematuria prior to considering AC  3  History of mild hyponatremia-resolved    4   Bipolar disorder-on medical therapy       History of Present Illness   Physician Requesting Consult: Brennen Toledo MD  Reason for Consult / Principal Problem: atrial fibrillation    HPI: Denzel Quinonez is a 65y o  year old female with no known cardiac history  who presents onset atrial fibrillation  She is at her PCP office today for abnormal blood work that was identified by her psychiatrist (Hyponatremia) and she was noted to be in AFib with RV R  She was sent to emergency room for further evaluation  She has been minimally symptomatic recently  She has been noticing some difficulty taking a deep breathe deep breath at times  She has noticed some mild exertional dyspnea recently  No chest discomfort  No palpitations  She knows to gait be slightly off  She thinks that she may be having some recent activity intolerance  She is a homemaker  She had 2 different episodes of hematuria both lasting a couple of days 1 about 6 weeks ago and the other more recently  The first time was a  darker blood and this more recent is been bright red  Urinalysis trace blood and urine C&S is pending  She has no other signs of a UTI  She does report some recent worsening stressors at home  No recent fever or chills  She has otherwise been in her usual state health  No recent medication changes  She is not an active smoker  No drug use  She said her mother  of congestive heart failure  Inpatient consult to Cardiology  Consult performed by: Delma Courser ordered by: Dayne Barajas          Review of Systems   Constitutional: Positive for activity change and fatigue  HENT: Negative  Eyes: Negative  Respiratory: Positive for shortness of breath  Cardiovascular: Negative for chest pain, palpitations and leg swelling  Gastrointestinal: Negative  Genitourinary: Positive for hematuria  Neurological: Negative  Hematological: Negative  Psychiatric/Behavioral: The patient is nervous/anxious          Historical Information   Past Medical History:   Diagnosis Date    Psychiatric disorder     bipolar    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    Varicose veins of legs      Past Surgical History:   Procedure Laterality Date     SECTION       History   Alcohol Use No     History   Drug Use No     Comment: History of cannabis use years ago  No current recreational drug use     History   Smoking Status    Former Smoker   Smokeless Tobacco    Never Used     Family History:   Family History   Problem Relation Age of Onset    Psychiatric Illness Maternal Aunt     Breast cancer Maternal Aunt     Stroke Mother         CVA    Heart failure Mother     Diabetes Mother     Hypertension Mother     Thyroid disease Mother     Gallbladder disease Mother         gallstones    Diabetes Brother     Multiple sclerosis Brother     Diabetes Maternal Grandmother     Breast cancer Cousin        Meds/Allergies   current meds:   Current Facility-Administered Medications   Medication Dose Route Frequency    acetaminophen (TYLENOL) tablet 650 mg  650 mg Oral Q6H PRN    ascorbic acid (VITAMIN C) tablet 500 mg  500 mg Oral Daily    benztropine (COGENTIN) tablet 1 mg  1 mg Oral BID    divalproex sodium (DEPAKOTE ER) 24 hr tablet 500 mg  500 mg Oral QPM    metoprolol (LOPRESSOR) injection 5 mg  5 mg Intravenous Q6H PRN    metoprolol tartrate (LOPRESSOR) tablet 25 mg  25 mg Oral Q12H RUPERTO    ondansetron (ZOFRAN) injection 4 mg  4 mg Intravenous Q6H PRN    risperiDONE (RisperDAL) tablet 1 mg  1 mg Oral QPM    sodium chloride 0 9 % infusion  50 mL/hr Intravenous Once    and PTA meds:  Prescriptions Prior to Admission   Medication    ascorbic acid (VITAMIN C) 500 mg tablet    benztropine (COGENTIN) 1 mg tablet    divalproex sodium (DEPAKOTE ER) 500 mg 24 hr tablet    ibuprofen (MOTRIN) 200 mg tablet    Multiple Vitamin (MULTI-VITAMIN DAILY) TABS    risperiDONE (RisperDAL) 1 mg tablet     Allergies   Allergen Reactions    Levaquin [Levofloxacin]      "heavy legs"    Ampicillin Rash     Category: Allergy;     Clindamycin Rash     Category:  Allergy;        Objective   Vitals: Blood pressure 105/65, pulse (!) 112, temperature 97 6 °F (36 4 °C), temperature source Oral, resp  rate 16, height 5' 10" (1 778 m), weight 88 5 kg (195 lb 1 7 oz), SpO2 98 %  Orthostatic Blood Pressures      Most Recent Value   Blood Pressure  105/65 filed at 11/16/2018 1000   Patient Position - Orthostatic VS  Sitting filed at 11/16/2018 7924            Intake/Output Summary (Last 24 hours) at 11/16/18 1108  Last data filed at 11/15/18 2211   Gross per 24 hour   Intake             1360 ml   Output                0 ml   Net             1360 ml       Invasive Devices     Peripheral Intravenous Line            Peripheral IV 11/15/18 Left Antecubital less than 1 day                Physical Exam: /65   Pulse (!) 112   Temp 97 6 °F (36 4 °C) (Oral)   Resp 16   Ht 5' 10" (1 778 m)   Wt 88 5 kg (195 lb 1 7 oz)   SpO2 98%   BMI 27 99 kg/m²   General Appearance:    Alert, cooperative, no distress, appears stated age   Head:    Normocephalic, no scleral icterus   Eyes:    PERRL   Nose:   Nares normal, septum midline, mucosa normal, no drainage    Throat:   Lips, mucosa, and tongue normal   Neck:   Supple, symmetrical, trachea midline     no JVD   Back:     Symmetric   Lungs:     Clear to auscultation bilaterally, respirations unlabored   Chest Wall:    No tenderness or deformity    Heart:    Iregular rate and rhythm, S1 and S2 normal, no murmur, rub   or gallop   Abdomen:     Soft, non-tender, bowel sounds active all four quadrants,     no masses, no organomegaly   Extremities:   Extremities normal, atraumatic, no cyanosis or edema   Pulses:   2+ and symmetric all extremities   Skin:   Skin color, texture, turgor normal, no rashes or lesions   Neurologic:   Alert and oriented to person place and time   No focal deficits       Lab Results:   Recent Results (from the past 72 hour(s))   POCT urine dip    Collection Time: 11/15/18 11:21 AM   Result Value Ref Range    LEUKOCYTE ESTERASE,UA neg     NITRITE,UA neg     SL AMB POCT UROBILINOGEN neg     POCT URINE PROTEIN trace      PH,UA 6     BLOOD,UA trace     SPECIFIC GRAVITY,UA 1 015     KETONES,UA neg     BILIRUBIN,UA neg     GLUCOSE, UA neg      COLOR,UA yellow     CLARITY,UA hazy    CBC and differential    Collection Time: 11/15/18  1:42 PM   Result Value Ref Range    WBC 7 84 4 31 - 10 16 Thousand/uL    RBC 4 79 3 81 - 5 12 Million/uL    Hemoglobin 13 6 11 5 - 15 4 g/dL    Hematocrit 41 8 34 8 - 46 1 %    MCV 87 82 - 98 fL    MCH 28 4 26 8 - 34 3 pg    MCHC 32 5 31 4 - 37 4 g/dL    RDW 14 6 11 6 - 15 1 %    MPV 11 2 8 9 - 12 7 fL    Platelets 851 027 - 346 Thousands/uL    nRBC 0 /100 WBCs    Neutrophils Relative 57 43 - 75 %    Immat GRANS % 0 0 - 2 %    Lymphocytes Relative 31 14 - 44 %    Monocytes Relative 8 4 - 12 %    Eosinophils Relative 3 0 - 6 %    Basophils Relative 1 0 - 1 %    Neutrophils Absolute 4 51 1 85 - 7 62 Thousands/µL    Immature Grans Absolute 0 03 0 00 - 0 20 Thousand/uL    Lymphocytes Absolute 2 39 0 60 - 4 47 Thousands/µL    Monocytes Absolute 0 61 0 17 - 1 22 Thousand/µL    Eosinophils Absolute 0 22 0 00 - 0 61 Thousand/µL    Basophils Absolute 0 08 0 00 - 0 10 Thousands/µL   Comprehensive metabolic panel    Collection Time: 11/15/18  1:42 PM   Result Value Ref Range    Sodium 140 136 - 145 mmol/L    Potassium 3 9 3 5 - 5 3 mmol/L    Chloride 100 100 - 108 mmol/L    CO2 30 21 - 32 mmol/L    ANION GAP 10 4 - 13 mmol/L    BUN 21 5 - 25 mg/dL    Creatinine 0 89 0 60 - 1 30 mg/dL    Glucose 91 65 - 140 mg/dL    Calcium 9 9 8 3 - 10 1 mg/dL    AST 20 5 - 45 U/L    ALT 28 12 - 78 U/L    Alkaline Phosphatase 63 46 - 116 U/L    Total Protein 8 0 6 4 - 8 2 g/dL    Albumin 4 1 3 5 - 5 0 g/dL    Total Bilirubin 0 70 0 20 - 1 00 mg/dL    eGFR 70 ml/min/1 73sq m   Troponin I    Collection Time: 11/15/18  1:42 PM   Result Value Ref Range    Troponin I <0 02 <=0 04 ng/mL   Magnesium    Collection Time: 11/15/18  1:42 PM   Result Value Ref Range    Magnesium 1 9 1 6 - 2 6 mg/dL   TSH, 3rd generation Collection Time: 11/16/18  8:39 AM   Result Value Ref Range    TSH 3RD GENERATON 2 376 0 358 - 3 740 uIU/mL   Basic metabolic panel    Collection Time: 11/16/18  8:39 AM   Result Value Ref Range    Sodium 141 136 - 145 mmol/L    Potassium 4 2 3 5 - 5 3 mmol/L    Chloride 105 100 - 108 mmol/L    CO2 28 21 - 32 mmol/L    ANION GAP 8 4 - 13 mmol/L    BUN 19 5 - 25 mg/dL    Creatinine 0 93 0 60 - 1 30 mg/dL    Glucose 94 65 - 140 mg/dL    Calcium 8 9 8 3 - 10 1 mg/dL    eGFR 67 ml/min/1 73sq m   Magnesium    Collection Time: 11/16/18  8:39 AM   Result Value Ref Range    Magnesium 2 0 1 6 - 2 6 mg/dL   CBC (With Platelets)    Collection Time: 11/16/18  8:39 AM   Result Value Ref Range    WBC 5 22 4 31 - 10 16 Thousand/uL    RBC 4 60 3 81 - 5 12 Million/uL    Hemoglobin 13 0 11 5 - 15 4 g/dL    Hematocrit 40 0 34 8 - 46 1 %    MCV 87 82 - 98 fL    MCH 28 3 26 8 - 34 3 pg    MCHC 32 5 31 4 - 37 4 g/dL    RDW 14 7 11 6 - 15 1 %    Platelets 104 766 - 696 Thousands/uL    MPV 10 9 8 9 - 12 7 fL     Imaging: I have personally reviewed pertinent reports  EKG: Atrial fibrillation  VTE Prophylaxis: Enoxaparin (Lovenox)    Code Status: Level 1 - Full Code  Advance Directive and Living Will:      Power of :    POLST:      Counseling / Coordination of Care  Total floor / unit time spent today 60 minutes  Greater than 50% of total time was spent with the patient and / or family counseling and / or coordination of care

## 2018-11-16 NOTE — DISCHARGE INSTRUCTIONS
Start Lopressor 50 mg twice daily and Eliquis 5 mg twice daily  Monitor for further blood in urine  Follow-up with cardiology on 12/5/18  If bleeding occurs, come to the emergency department or call your primary care provider    A-fib (Atrial Fibrillation)   WHAT YOU NEED TO KNOW:   A-fib may come and go, or it may be a long-term condition  A-fib can cause blood clots, stroke, or heart failure  These conditions may become life-threatening  It is important to treat and manage a-fib to help prevent a blood clot, stroke, or heart failure  DISCHARGE INSTRUCTIONS:   Call 911 for any of the following:   · You have any of the following signs of a heart attack:      ¨ Squeezing, pressure, or pain in your chest that lasts longer than 5 minutes or returns    ¨ Discomfort or pain in your back, neck, jaw, stomach, or arm     ¨ Trouble breathing    ¨ Nausea or vomiting    ¨ Lightheadedness or a sudden cold sweat, especially with chest pain or trouble breathing    · You have any of the following signs of a stroke:      ¨ Numbness or drooping on one side of your face     ¨ Weakness in an arm or leg    ¨ Confusion or difficulty speaking    ¨ Dizziness, a severe headache, or vision loss  Seek care immediately if:  You have any of the following signs of a blood clot:  · You feel lightheaded, are short of breath, and have chest pain  · You cough up blood  · You have swelling, redness, pain, or warmth in your arm or leg  Contact your cardiologist or healthcare provider if:   · Your heart rate is higher than your healthcare provider said it should be  · You have new or worsening swelling in your legs, feet, ankles, or abdomen  · You are short of breath, even at rest      · You have questions or concerns about your condition or care  Medicines: You may need any of the following:  · Heart medicines  help control your heart rate and rhythm  You may need more than one medicine to treat your symptoms       · Blood thinners    help prevent blood clots  Examples of blood thinners include heparin and warfarin  Clots can cause strokes, heart attacks, and death  The following are general safety guidelines to follow while you are taking a blood thinner:    ¨ Watch for bleeding and bruising while you take blood thinners  Watch for bleeding from your gums or nose  Watch for blood in your urine and bowel movements  Use a soft washcloth on your skin, and a soft toothbrush to brush your teeth  This can keep your skin and gums from bleeding  If you shave, use an electric shaver  Do not play contact sports  ¨ Tell your dentist and other healthcare providers that you take anticoagulants  Wear a bracelet or necklace that says you take this medicine  ¨ Do not start or stop any medicines unless your healthcare provider tells you to  Many medicines cannot be used with blood thinners  ¨ Tell your healthcare provider right away if you forget to take the medicine, or if you take too much  ¨ Warfarin  is a blood thinner that you may need to take  The following are things you should be aware of if you take warfarin  § Foods and medicines can affect the amount of warfarin in your blood  Do not make major changes to your diet while you take warfarin  Warfarin works best when you eat about the same amount of vitamin K every day  Vitamin K is found in green leafy vegetables and certain other foods  Ask for more information about what to eat when you are taking warfarin  § You will need to see your healthcare provider for follow-up visits when you are on warfarin  You will need regular blood tests  These tests are used to decide how much medicine you need  · Antiplatelets , such as aspirin, help prevent blood clots  Take your antiplatelet medicine exactly as directed  These medicines make it more likely for you to bleed or bruise  If you are told to take aspirin, do not take acetaminophen or ibuprofen instead       · Take your medicine as directed  Contact your healthcare provider if you think your medicine is not helping or if you have side effects  Tell him or her if you are allergic to any medicine  Keep a list of the medicines, vitamins, and herbs you take  Include the amounts, and when and why you take them  Bring the list or the pill bottles to follow-up visits  Carry your medicine list with you in case of an emergency  Follow up with your cardiologist as directed: You will need regular blood tests and monitoring  Write down your questions so you remember to ask them during your visits  Manage A-fib:   · Know your target heart rate  Learn how to take your pulse and monitor your heart rate  · Manage other health conditions  This includes high blood pressure, sleep apnea, thyroid disease, diabetes, and other heart conditions  Take medicine as directed and follow your treatment plan  · Limit or do not drink alcohol  Alcohol can make a-fib hard to manage  Ask your healthcare provider if it is safe for you to drink alcohol  A drink of alcohol is 12 ounces of beer, 5 ounces of wine, or 1½ ounces of liquor  · Do not smoke  Nicotine and other chemicals in cigarettes and cigars can cause heart and lung damage  Ask your healthcare provider for information if you currently smoke and need help to quit  E-cigarettes or smokeless tobacco still contain nicotine  Talk to your healthcare provider before you use these products  · Eat heart-healthy foods  Heart healthy foods will help keep your cholesterol low  These include fruits, vegetables, whole-grain breads, low-fat dairy products, beans, lean meats, and fish  Replace butter and margarine with heart-healthy oils such as olive oil and canola oil  · Maintain a healthy weight  Ask your healthcare provider how much you should weigh  Ask him to help you create a weight loss plan if you are overweight  · Exercise for 30 minutes  most days of the week   Ask your healthcare provider about the best exercise plan for you  © 2017 [employer]  Information is for End User's use only and may not be sold, redistributed or otherwise used for commercial purposes  All illustrations and images included in CareNotes® are the copyrighted property of [employer]  or [employer]ill[employer]  The above information is an  only  It is not intended as medical advice for individual conditions or treatments  Talk to your doctor, nurse or pharmacist before following any medical regimen to see if it is safe and effective for you  Metoprolol (By mouth)   Metoprolol (met-oh-PROE-lol)  Treats high blood pressure, angina (chest pain), and heart failure  May lower the risk of death after a heart attack  This medicine is a beta-blocker  Brand Name(s): Lopressor, Toprol XL   There may be other brand names for this medicine  When This Medicine Should Not Be Used: This medicine is not right for everyone  Do not use if you had an allergic reaction to metoprolol or similar medicines  Do not use this medicine if you have certain blood circulation or heart problems  Ask your doctor about these problems  How to Use This Medicine:   Tablet, Long Acting Tablet  · Take your medicine as directed  Your dose may need to be changed several times to find what works best for you  · Take this medicine with a meal or right after a meal  Take this medicine the same way every day, at the same time  · Swallow the tablet whole with a glass of water  You may break the extended-release tablet in half, but do not chew or crush it  · Missed dose: Take a dose as soon as you remember  If it is almost time for your next dose, wait until then and take a regular dose  Do not take extra medicine to make up for a missed dose  · Store the medicine in a closed container at room temperature, away from heat, moisture, and direct light    Drugs and Foods to Avoid:   Ask your doctor or pharmacist before using any other medicine, including over-the-counter medicines, vitamins, and herbal products  · Some medicines can affect how metoprolol works  Tell your doctor if you are taking any of the following:   ¨ Digoxin, dipyridamole, hydralazine, hydroxychloroquine, methyldopa, quinidine  ¨ Medicine to treat depression (such as bupropion, clomipramine, desipramine, fluoxetine, fluvoxamine, paroxetine, sertraline), medicine to treat mental illness (such as chlorpromazine, fluphenazine, haloperidol, thioridazine), medicine for heart rhythm problems (such as propafenone), HIV/AIDS medicine (such as ritonavir), medicine to treat a fungus infection (such as terbinafine), a monoamine oxidase inhibitor (MAOI), an ergot medicine for headaches, a calcium channel blocker (such as amlodipine, diltiazem, verapamil), or an alpha blocker (such as clonidine, prazosin, reserpine, guanethidine)  Warnings While Using This Medicine:   · Tell your doctor if you are pregnant or breastfeeding, or if you have blood vessel, heart, or circulation problems (such as heart failure, rhythm problems, or slow heartbeat)  Tell your doctor if you have kidney disease, liver disease, diabetes, lung disease (such as asthma), an overactive thyroid, or a history of allergies  · This medicine may cause worse symptoms of heart failure while the dose is being adjusted  · Do not stop using this medicine suddenly  Your doctor will need to slowly decrease your dose before you stop it completely  · Tell any doctor or dentist who treats you that you are using this medicine  You may need to stop using this medicine several days before you have surgery or medical tests  · This medicine could lower your blood pressure too much, especially when you first use it or if you are dehydrated  Stand or sit up slowly if you feel lightheaded or dizzy  · This medicine may make you dizzy or drowsy   Do not drive or do anything else that could be dangerous until you know how this medicine affects you  · Your doctor will check your progress and the effects of this medicine at regular visits  Keep all appointments  · Keep all medicine out of the reach of children  Never share your medicine with anyone  Possible Side Effects While Using This Medicine:   Call your doctor right away if you notice any of these side effects:  · Allergic reaction: Itching or hives, swelling in your face or hands, swelling or tingling in your mouth or throat, chest tightness, trouble breathing  · Lightheadedness, dizziness, or fainting  · Slow heartbeat  · Swelling in your hands, ankles, or feet, trouble breathing, tiredness  · Worsening chest pain  If you notice these less serious side effects, talk with your doctor:   · Diarrhea  · Mild dizziness or tiredness  If you notice other side effects that you think are caused by this medicine, tell your doctor  Call your doctor for medical advice about side effects  You may report side effects to FDA at 4-126-FDA-7960  © 2017 [facility] Information is for End User's use only and may not be sold, redistributed or otherwise used for commercial purposes  The above information is an  only  It is not intended as medical advice for individual conditions or treatments  Talk to your doctor, nurse or pharmacist before following any medical regimen to see if it is safe and effective for you  Apixaban (By mouth)   Apixaban (a-PIX-a-ban)  Treats and prevents blood clots  This medicine is a blood thinner  Brand Name(s): Eliquis   There may be other brand names for this medicine  When This Medicine Should Not Be Used: This medicine is not right for everyone  Do not use it if you had an allergic reaction to apixaban or you have active bleeding  How to Use This Medicine:   Tablet  · Your doctor will tell you how much medicine to use  Do not use more than directed    · If you are not able to swallow the tablets whole, they may be crushed and mixed in water, 5% dextrose in water (D5W), apple juice, or applesauce  The crushed tablets may be mixed with 60 mL of water or D5W dose and given through a nasogastric tube (NGT)  · This medicine should come with a Medication Guide  Ask your pharmacist for a copy if you do not have one  · Missed dose: Take a dose as soon as you remember  If it is almost time for your next dose, wait until then and take a regular dose  Do not take extra medicine to make up for a missed dose  · Store the medicine in a closed container at room temperature, away from heat, moisture, and direct light  Drugs and Foods to Avoid:   Ask your doctor or pharmacist before using any other medicine, including over-the-counter medicines, vitamins, and herbal products  · Some medicines can affect how apixaban works  Tell your doctor if you are using any of the following:   ¨ Carbamazepine, clarithromycin, itraconazole, ketoconazole, phenytoin, rifampin, ritonavir, Farzad's wort  ¨ Blood thinner (including clopidogrel, heparin, prasugrel, warfarin)  ¨ Medicine to treat depression  ¨ NSAID pain or arthritis medicine (including aspirin, celecoxib, diclofenac, ibuprofen, naproxen)  Warnings While Using This Medicine:   · Tell your doctor if you are pregnant or breastfeeding, or if you have kidney disease, liver disease, bleeding problems, or an artificial heart valve  · Do not stop using this medicine suddenly without asking your doctor  You might have a higher risk of stroke for a short time after you stop using this medicine  · This medicine increases your risk for bleeding that can become serious if not controlled  You may also bruise easily, and it may take longer than usual for bleeding to stop  · This medicine may increase your risk for blood clots in your spine or back if you undergo an epidural or spinal puncture  This could lead to paralysis   Tell your doctor if you ever had spine problems or back surgery  · Tell any doctor or dentist who treats you that you are using this medicine  With your doctor's supervision, you may need to stop using this medicine several days before you have surgery or medical tests  · Your doctor will do lab tests at regular visits to check on the effects of this medicine  Keep all appointments  · Keep all medicine out of the reach of children  Never share your medicine with anyone  Possible Side Effects While Using This Medicine:   Call your doctor right away if you notice any of these side effects:  · Allergic reaction: Itching or hives, swelling in your face or hands, swelling or tingling in your mouth or throat, chest tightness, trouble breathing  · Change in how much or how often you urinate, red or pink urine  · Chest pain, trouble breathing  · Coughing up blood, vomiting blood or material that looks like coffee grounds  · Numbness, tingling, or muscle weakness in your legs or feet  · Red or black, tarry stools  · Unusual bleeding, bruising, or weakness  If you notice other side effects that you think are caused by this medicine, tell your doctor  Call your doctor for medical advice about side effects  You may report side effects to FDA at 5-989-FDA-3912  © 2017 [address] [employer] Information is for End User's use only and may not be sold, redistributed or otherwise used for commercial purposes  The above information is an  only  It is not intended as medical advice for individual conditions or treatments  Talk to your doctor, nurse or pharmacist before following any medical regimen to see if it is safe and effective for you

## 2018-11-16 NOTE — PLAN OF CARE
Problem: DISCHARGE PLANNING - CARE MANAGEMENT  Goal: Discharge to post-acute care or home with appropriate resources  INTERVENTIONS:  - Conduct assessment to determine patient/family and health care team treatment goals, and need for post-acute services based on payer coverage, community resources, and patient preferences, and barriers to discharge  - Address psychosocial, clinical, and financial barriers to discharge as identified in assessment in conjunction with the patient/family and health care team  - Arrange appropriate level of post-acute services according to patients   needs and preference and payer coverage in collaboration with the physician and health care team  - Communicate with and update the patient/family, physician, and health care team regarding progress on the discharge plan  - Arrange appropriate transportation to post-acute venues  Outcome: Completed Date Met: 11/16/18  LOS 1  NOT A BUNDLE;  NOT A READMISSION  Consult received for pricing of Eliquis  Pt qualifies for the free 30 days coupon and also the $10 co pay card  Pt will not be started on this medication at this time

## 2018-11-16 NOTE — PLAN OF CARE
CARDIOVASCULAR - ADULT     Maintains optimal cardiac output and hemodynamic stability Completed     Absence of cardiac dysrhythmias or at baseline rhythm Completed        DISCHARGE PLANNING     Discharge to home or other facility with appropriate resources Completed        Knowledge Deficit     Patient/family/caregiver demonstrates understanding of disease process, treatment plan, medications, and discharge instructions Completed

## 2018-11-16 NOTE — ASSESSMENT & PLAN NOTE
Note noted to be in new onset atrial fibrillation today while at her primary care provider's office  Referred to emergency department for further evaluation  Heart rate 82 - 133 bpm since admission   EKG atrial fibrillation with RVR,   Initial troponin < 0 02  Status post metoprolol 25 mg po X1 in ED  · Echocardiogram within normal limits   · Check TSH within normal limits   · Cardiology consultation appreciated  · Discharge home on Metoprolol 50 mg BID  · Will need cardiology follow-up for outpatient cardioversion after at least 4 weeks of anticoagulation once hematuria has resolved

## 2018-11-16 NOTE — SOCIAL WORK
LOS 1   NOT A BUNDLE;  NOT A READMISSION  Consult received for pricing of Eliquis  Pt qualifies for the free 30 days coupon and also the $10 co pay card  Pt will not be started on this medication at this time

## 2018-11-17 LAB — BACTERIA UR CULT: NORMAL

## 2018-11-19 ENCOUNTER — TRANSITIONAL CARE MANAGEMENT (OUTPATIENT)
Dept: FAMILY MEDICINE CLINIC | Facility: CLINIC | Age: 63
End: 2018-11-19

## 2018-11-21 ENCOUNTER — OFFICE VISIT (OUTPATIENT)
Dept: FAMILY MEDICINE CLINIC | Facility: CLINIC | Age: 63
End: 2018-11-21
Payer: COMMERCIAL

## 2018-11-21 VITALS
DIASTOLIC BLOOD PRESSURE: 82 MMHG | OXYGEN SATURATION: 98 % | TEMPERATURE: 97.8 F | HEART RATE: 92 BPM | WEIGHT: 198.4 LBS | HEIGHT: 70 IN | BODY MASS INDEX: 28.4 KG/M2 | SYSTOLIC BLOOD PRESSURE: 120 MMHG

## 2018-11-21 DIAGNOSIS — I48.91 ATRIAL FIBRILLATION WITH RVR (HCC): Primary | ICD-10-CM

## 2018-11-21 DIAGNOSIS — R31.0 GROSS HEMATURIA: ICD-10-CM

## 2018-11-21 PROBLEM — E87.1 HYPONATREMIA: Status: RESOLVED | Noted: 2018-11-15 | Resolved: 2018-11-21

## 2018-11-21 PROCEDURE — 99495 TRANSJ CARE MGMT MOD F2F 14D: CPT | Performed by: PHYSICIAN ASSISTANT

## 2018-11-21 NOTE — PROGRESS NOTES
Assessment/Plan:  TCM Call (since 10/21/2018)     Date and time call was made  11/19/2018  2:20 PM    Hospital care reviewed  Records reviewed    Patient was hospitialized at  [facility]    Date of Admission  11/15/18    Date of discharge  11/15/18    Diagnosis  ATRIAL FIB WITH RVR    Disposition  Home    Were the patients medications reviewed and updated  Yes    Current Symptoms  None      TCM Call (since 10/21/2018)     Scheduled for follow up? Yes    I have advised the patient to call PCP with any new or worsening symptoms  Angelita Etienne    Counseling  Patient    Counseling topics  Importance of RX compliance           Diagnoses and all orders for this visit:    Atrial fibrillation with RVR ([facility] [address] )  Comments:  Patient to continue her metoprolol 50 mg twice a day Eliquis 5 mg twice a day  Follow up with cardiologist   Call if any gross bleeding    Gross hematuria  Comments: Will get ultrasound bilateral kidneys and bladder to rule out any underlying pathology  Orders:  -     US kidney and bladder; Future          Subjective:      Patient ID: Siddharth Hamlin is a 65 y o  female  Patient presents for hospital admission follow-up  Patient was seen in the office I am last week on Thursday  She was found to be in AFib and sent to the emergency room  Patient was admitted  Cardiology was consulted  Patient was started on metoprolol 50 mg twice a day  She was also started on anticoagulation with Eliquis 5 mg twice a day  Patient has follow-up with Cardiology to discuss possible cardioversion  Patient also states she has no more episodes of gross hematuria her urine culture was negative  Patient will need at least an ultrasound kidney bladder versus a CT scan abdomen and pelvis to rule out pathology there  Patient denies further bleeding  Discussed what to watch for for as far as bleeding with the Eliquis IV bruising nose bleeds rectal bleeding or blood in the urine          The following portions of the patient's history were reviewed and updated as appropriate:   She  has a past medical history of Psychiatric disorder; Retina disorder; and Varicose veins of legs  She   Patient Active Problem List    Diagnosis Date Noted    Screening for breast cancer 11/15/2018    Screen for colon cancer 11/15/2018    Atrial fibrillation with RVR ([facility] ) 11/15/2018    Gross hematuria 11/15/2018    Therapeutic drug monitoring 09/13/2018    Extrapyramidal reaction 05/02/2018    Bipolar I disorder, most recent episode mixed, in full remission ([facility] ) 06/24/2013     Current Outpatient Prescriptions   Medication Sig Dispense Refill    apixaban (ELIQUIS) 5 mg Take 1 tablet (5 mg total) by mouth 2 (two) times a day 60 tablet 0    ascorbic acid (VITAMIN C) 500 mg tablet Take 500 mg by mouth daily      benztropine (COGENTIN) 1 mg tablet Take 1 tablet (1 mg total) by mouth 2 (two) times a day for 150 days 60 tablet 4    divalproex sodium (DEPAKOTE ER) 500 mg 24 hr tablet Take 1 tablet (500 mg total) by mouth every evening for 150 days 30 tablet 4    ibuprofen (MOTRIN) 200 mg tablet Take by mouth every 6 (six) hours as needed for mild pain      metoprolol tartrate (LOPRESSOR) 50 mg tablet Take 1 tablet (50 mg total) by mouth every 12 (twelve) hours 60 tablet 1    Multiple Vitamin (MULTI-VITAMIN DAILY) TABS Take 1 tablet by mouth daily      risperiDONE (RisperDAL) 1 mg tablet Take 1 tablet (1 mg total) by mouth every evening for 150 days 30 tablet 4     No current facility-administered medications for this visit        Current Outpatient Prescriptions on File Prior to Visit   Medication Sig    apixaban (ELIQUIS) 5 mg Take 1 tablet (5 mg total) by mouth 2 (two) times a day    ascorbic acid (VITAMIN C) 500 mg tablet Take 500 mg by mouth daily    benztropine (COGENTIN) 1 mg tablet Take 1 tablet (1 mg total) by mouth 2 (two) times a day for 150 days    divalproex sodium (DEPAKOTE ER) 500 mg 24 hr tablet Take 1 tablet (500 mg total) by mouth every evening for 150 days    ibuprofen (MOTRIN) 200 mg tablet Take by mouth every 6 (six) hours as needed for mild pain    metoprolol tartrate (LOPRESSOR) 50 mg tablet Take 1 tablet (50 mg total) by mouth every 12 (twelve) hours    Multiple Vitamin (MULTI-VITAMIN DAILY) TABS Take 1 tablet by mouth daily    risperiDONE (RisperDAL) 1 mg tablet Take 1 tablet (1 mg total) by mouth every evening for 150 days     No current facility-administered medications on file prior to visit  She is allergic to levaquin [levofloxacin]; ampicillin; and clindamycin       Review of Systems   Constitutional: Positive for fatigue  Negative for activity change and appetite change  HENT: Negative for nosebleeds  Respiratory: Negative for cough  Cardiovascular: Negative for chest pain, palpitations and leg swelling  Genitourinary: Negative for difficulty urinating and dysuria  Hematological: Does not bruise/bleed easily  Objective:        Physical Exam   Constitutional: She is oriented to person, place, and time  She appears well-developed and well-nourished  No distress  HENT:   Head: Normocephalic and atraumatic  Right Ear: Tympanic membrane, external ear and ear canal normal    Left Ear: Tympanic membrane, external ear and ear canal normal    Mouth/Throat: Oropharynx is clear and moist  No oropharyngeal exudate or posterior oropharyngeal erythema  Eyes: Pupils are equal, round, and reactive to light  Conjunctivae are normal    Neck: Normal range of motion  Neck supple  Carotid bruit is not present  No thyromegaly present  Cardiovascular: Normal rate  Exam reveals no gallop and no friction rub  No murmur heard  Heart rate is regularly irregular  Patient is still in AFib  Pulmonary/Chest: Effort normal and breath sounds normal  No respiratory distress  She has no wheezes  Abdominal: Soft  Bowel sounds are normal  She exhibits no distension and no mass  There is no tenderness  Musculoskeletal: Normal range of motion  She exhibits no edema  Lymphadenopathy:     She has no cervical adenopathy  Neurological: She is alert and oriented to person, place, and time  Skin: Skin is warm and dry  No rash noted  She is not diaphoretic  No erythema  Psychiatric: She has a normal mood and affect  Her behavior is normal  Judgment and thought content normal    Nursing note and vitals reviewed

## 2018-12-05 ENCOUNTER — OFFICE VISIT (OUTPATIENT)
Dept: CARDIOLOGY CLINIC | Facility: CLINIC | Age: 63
End: 2018-12-05
Payer: COMMERCIAL

## 2018-12-05 VITALS
BODY MASS INDEX: 28.35 KG/M2 | HEART RATE: 86 BPM | SYSTOLIC BLOOD PRESSURE: 120 MMHG | HEIGHT: 70 IN | DIASTOLIC BLOOD PRESSURE: 82 MMHG | WEIGHT: 198 LBS

## 2018-12-05 DIAGNOSIS — I48.19 PERSISTENT ATRIAL FIBRILLATION (HCC): Primary | ICD-10-CM

## 2018-12-05 PROBLEM — I48.0 PAROXYSMAL ATRIAL FIBRILLATION (HCC): Status: ACTIVE | Noted: 2018-11-15

## 2018-12-05 PROCEDURE — 93000 ELECTROCARDIOGRAM COMPLETE: CPT | Performed by: INTERNAL MEDICINE

## 2018-12-05 PROCEDURE — 99214 OFFICE O/P EST MOD 30 MIN: CPT | Performed by: INTERNAL MEDICINE

## 2018-12-05 NOTE — PROGRESS NOTES
Cardiology Follow Up    Bindu Alves  1955  [id number]  [address] [facility]  [address] [place] [address]  [phone]    1  Paroxysmal atrial fibrillation (HCC)  POCT ECG       Interval History:     Mrs Yasmani Alan comes in for post hospitalization visit given new onset atrial fibrillation  I saw her in consultation a few weeks ago for for this, while she was in the hospital   While following up with her PCP for hyponatremia, irregular rhythm was detected in an ECG showed atrial fibrillation with rapid ventricular response  During her hospitalization metoprolol was added, and we up titrate this and her heart rates were under good control  There were also some issues with hematuria, which seem to have resolved  She is now on Eliquis for stroke prevention  She does continue to have some shortness of breath, but it appears improved with improved heart rates  She is complaining of some on and off chest tightness  She does not appear to have any palpitations at this point  No signs or symptoms of CHF  She denies lightheadedness or any a recent syncope        Patient Active Problem List   Diagnosis    Bipolar I disorder, most recent episode mixed, in full remission ([facility] )    Extrapyramidal reaction    Therapeutic drug monitoring    Screening for breast cancer    Screen for colon cancer    Paroxysmal atrial fibrillation ([facility] )    Gross hematuria     Past Medical History:   Diagnosis Date    Psychiatric disorder     bipolar    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    Varicose veins of legs      Social History     Social History    Marital status: /Civil Union     Spouse name: N/A    Number of children: 1    Years of education: 15     Occupational History    unemployed      Social History Main Topics    Smoking status: Former Smoker    Smokeless tobacco: Never Used    Alcohol use No    Drug use: No      Comment: History of cannabis use years ago   No current recreational drug use    Sexual activity: Yes     Other Topics Concern    Not on file     Social History Narrative    Education: high school graduate    Learning Disabilities: none    Marital History:     Children: 1 adult son    Living Arrangement: lives in home with     Occupational History: worked in [employer] in the past, unemployed    Functioning Relationships: good support system,  is supportive    Legal History: none     History: None    Always uses seat belt       Family History   Problem Relation Age of Onset    Psychiatric Illness Maternal Aunt     Breast cancer Maternal Aunt     Stroke Mother         CVA    Heart failure Mother     Diabetes Mother     Hypertension Mother     Thyroid disease Mother     Gallbladder disease Mother         gallstones    Diabetes Brother     Multiple sclerosis Brother     Diabetes Maternal Grandmother     Breast cancer Cousin      Past Surgical History:   Procedure Laterality Date     SECTION         Current Outpatient Prescriptions:     apixaban (ELIQUIS) 5 mg, Take 1 tablet (5 mg total) by mouth 2 (two) times a day, Disp: 60 tablet, Rfl: 0    ascorbic acid (VITAMIN C) 500 mg tablet, Take 500 mg by mouth daily, Disp: , Rfl:     benztropine (COGENTIN) 1 mg tablet, Take 1 tablet (1 mg total) by mouth 2 (two) times a day for 150 days, Disp: 60 tablet, Rfl: 4    divalproex sodium (DEPAKOTE ER) 500 mg 24 hr tablet, Take 1 tablet (500 mg total) by mouth every evening for 150 days, Disp: 30 tablet, Rfl: 4    ibuprofen (MOTRIN) 200 mg tablet, Take by mouth every 6 (six) hours as needed for mild pain, Disp: , Rfl:     metoprolol tartrate (LOPRESSOR) 50 mg tablet, Take 1 tablet (50 mg total) by mouth every 12 (twelve) hours, Disp: 60 tablet, Rfl: 1    Multiple Vitamin (MULTI-VITAMIN DAILY) TABS, Take 1 tablet by mouth daily, Disp: , Rfl:     risperiDONE (RisperDAL) 1 mg tablet, Take 1 tablet (1 mg total) by mouth every evening for 150 days, Disp: 30 tablet, Rfl: 4  Allergies   Allergen Reactions    Levaquin [Levofloxacin]      "heavy legs"    Ampicillin Rash     Category: Allergy;     Clindamycin Rash     Category: Allergy;        Labs:  Lab Results   Component Value Date     (L) 01/08/2016    K 4 2 11/16/2018    K 4 3 01/08/2016     11/16/2018     01/08/2016    CO2 28 11/16/2018    CO2 30 01/08/2016    BUN 19 11/16/2018    BUN 16 01/08/2016    CREATININE 0 93 11/16/2018    CREATININE 0 71 01/08/2016    GLUCOSE 89 01/08/2016    CALCIUM 8 9 11/16/2018    CALCIUM 8 9 01/08/2016     Lab Results   Component Value Date    WBC 5 22 11/16/2018    WBC 7 70 01/08/2016    HGB 13 0 11/16/2018    HGB 12 2 01/08/2016    HCT 40 0 11/16/2018    HCT 37 8 01/08/2016    MCV 87 11/16/2018    MCV 87 01/08/2016     11/16/2018     01/08/2016     Lab Results   Component Value Date    TRIG 84 11/08/2018    HDL 42 11/08/2018     Imaging:  ECG obtained today demonstrates atrial fibrillation  ECHO:  LEFT VENTRICLE:  Systolic function was normal  Ejection fraction was estimated to be 65 %  There were no regional wall motion abnormalities      LEFT ATRIUM:  The atrium was mildly dilated      RIGHT ATRIUM:  The atrium was mildly dilated      MITRAL VALVE:  There was mild annular calcification  There was trace regurgitation      TRICUSPID VALVE:  There was mild regurgitation  Pulmonary artery systolic pressure was within the normal range  Review of Systems:  Review of Systems   Constitutional: Positive for fatigue  HENT: Negative  Eyes: Negative  Respiratory: Positive for chest tightness and shortness of breath  Cardiovascular: Negative  Gastrointestinal: Negative  Musculoskeletal: Negative  Skin: Negative  Allergic/Immunologic: Negative  Neurological: Negative  Hematological: Negative      Psychiatric/Behavioral: The patient is nervous/anxious  All other systems reviewed and are negative  Physical Exam:  Physical Exam   Constitutional: She is oriented to person, place, and time  She appears well-developed and well-nourished  HENT:   Head: Normocephalic and atraumatic  Eyes: Pupils are equal, round, and reactive to light  EOM are normal  Right eye exhibits no discharge  Left eye exhibits no discharge  No scleral icterus  Neck: Normal range of motion  Neck supple  No JVD present  No thyromegaly present  Cardiovascular: Normal rate, S1 normal, S2 normal, normal heart sounds, intact distal pulses and normal pulses  An irregularly irregular rhythm present  Exam reveals no gallop and no friction rub  No murmur heard  Pulmonary/Chest: Effort normal and breath sounds normal  No respiratory distress  She has no wheezes  She has no rales  Abdominal: Soft  Bowel sounds are normal  She exhibits no distension  There is no tenderness  Musculoskeletal: Normal range of motion  She exhibits no edema, tenderness or deformity  Neurological: She is alert and oriented to person, place, and time  No cranial nerve deficit  Skin: Skin is warm and dry  No rash noted  Psychiatric: She has a normal mood and affect  Judgment and thought content normal    Nursing note and vitals reviewed  Discussion/Summary:    Da Zurita continues and persistent atrial fibrillation  She will continue the same dose of metoprolol as her heart rates are under good control  She is tolerating Eliquis for stroke prevention  We will now schedule a cardioversion in the near future  She will need to be on anticoagulation for a minimum of 4 weeks before we schedule this  We will then determine any changes in medical therapy after this procedure  She will follow up with in the office after her cardioversion  Counseling / Coordination of Care  Total office / unit time spent today 25 minutes    Greater than 50% of total time was spent with the patient and / or family counseling and / or coordination of care

## 2018-12-12 DIAGNOSIS — I48.91 ATRIAL FIBRILLATION WITH RVR (HCC): ICD-10-CM

## 2018-12-12 RX ORDER — METOPROLOL TARTRATE 50 MG/1
50 TABLET, FILM COATED ORAL EVERY 12 HOURS SCHEDULED
Qty: 60 TABLET | Refills: 6 | Status: SHIPPED | OUTPATIENT
Start: 2018-12-12 | End: 2019-07-11 | Stop reason: SDUPTHER

## 2018-12-28 RX ORDER — SODIUM CHLORIDE 9 MG/ML
50 INJECTION, SOLUTION INTRAVENOUS CONTINUOUS
Status: CANCELLED | OUTPATIENT
Start: 2018-12-28

## 2018-12-30 ENCOUNTER — TELEPHONE (OUTPATIENT)
Dept: INPATIENT UNIT | Facility: HOSPITAL | Age: 63
End: 2018-12-30

## 2018-12-31 ENCOUNTER — ANESTHESIA (OUTPATIENT)
Dept: NON INVASIVE DIAGNOSTICS | Facility: HOSPITAL | Age: 63
End: 2018-12-31

## 2018-12-31 ENCOUNTER — HOSPITAL ENCOUNTER (OUTPATIENT)
Dept: NON INVASIVE DIAGNOSTICS | Facility: HOSPITAL | Age: 63
Discharge: HOME | End: 2018-12-31

## 2018-12-31 ENCOUNTER — TELEPHONE (OUTPATIENT)
Dept: CARDIOLOGY CLINIC | Facility: CLINIC | Age: 63
End: 2018-12-31

## 2018-12-31 ENCOUNTER — ANESTHESIA EVENT (OUTPATIENT)
Dept: NON INVASIVE DIAGNOSTICS | Facility: HOSPITAL | Age: 63
End: 2018-12-31

## 2018-12-31 NOTE — TELEPHONE ENCOUNTER
P/C  Had to cancel the cardioversion because of N/V and diarrhea  she presently is unable to take meds Advised pt to try and stay hydrated and dry toast to see if she can hold anything down         KEARA  Thank you
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
## 2019-01-10 ENCOUNTER — OFFICE VISIT (OUTPATIENT)
Dept: PSYCHIATRY | Facility: CLINIC | Age: 64
End: 2019-01-10
Payer: COMMERCIAL

## 2019-01-10 VITALS
HEIGHT: 70 IN | HEART RATE: 93 BPM | BODY MASS INDEX: 27.92 KG/M2 | SYSTOLIC BLOOD PRESSURE: 95 MMHG | WEIGHT: 195 LBS | DIASTOLIC BLOOD PRESSURE: 69 MMHG

## 2019-01-10 DIAGNOSIS — Z51.81 THERAPEUTIC DRUG MONITORING: Chronic | ICD-10-CM

## 2019-01-10 DIAGNOSIS — G25.9 EXTRAPYRAMIDAL REACTION: Chronic | ICD-10-CM

## 2019-01-10 DIAGNOSIS — F31.78 BIPOLAR I DISORDER, MOST RECENT EPISODE MIXED, IN FULL REMISSION (HCC): Primary | Chronic | ICD-10-CM

## 2019-01-10 PROCEDURE — 99213 OFFICE O/P EST LOW 20 MIN: CPT | Performed by: PSYCHIATRY & NEUROLOGY

## 2019-01-10 PROCEDURE — 90833 PSYTX W PT W E/M 30 MIN: CPT | Performed by: PSYCHIATRY & NEUROLOGY

## 2019-01-10 RX ORDER — BENZTROPINE MESYLATE 1 MG/1
1 TABLET ORAL 2 TIMES DAILY
Qty: 60 TABLET | Refills: 4 | Status: SHIPPED | OUTPATIENT
Start: 2019-01-10 | End: 2019-05-09 | Stop reason: SDUPTHER

## 2019-01-10 RX ORDER — RISPERIDONE 1 MG/1
1 TABLET, FILM COATED ORAL EVERY EVENING
Qty: 30 TABLET | Refills: 4 | Status: SHIPPED | OUTPATIENT
Start: 2019-01-10 | End: 2019-05-09 | Stop reason: SDUPTHER

## 2019-01-10 RX ORDER — DIVALPROEX SODIUM 500 MG/1
500 TABLET, EXTENDED RELEASE ORAL EVERY EVENING
Qty: 30 TABLET | Refills: 4 | Status: SHIPPED | OUTPATIENT
Start: 2019-01-10 | End: 2019-05-09 | Stop reason: SDUPTHER

## 2019-01-10 NOTE — PSYCH
TREATMENT PLAN (Medication Management Only)        [address]    Name/Date of Birth/MRN:  Shreyas Del Castillo 59 y o  1955 MRN: [id number]  Date of Treatment Plan: January 10, 2019  Diagnosis/Diagnoses:   1  Bipolar I disorder, most recent episode mixed, in full remission ([facility] [address] )    2  Extrapyramidal reaction    3  Therapeutic drug monitoring      Strengths/Personal Resources for Self-Care: "taking medications, getting good food and sleep, staying busy"  Area/Areas of need (in own words): "to continue medications"  1  Long Term Goal: maintain mood stability  Target Date: 4 months - 5/10/2019  Person/Persons responsible for completion of goal: Roberto Blevins  2  Short Term Objective (s) - How will we reach this goal?:   A  Provider new recommended medication/dosage changes and/or continue medication(s): continue current medications as prescribed (Depakote ER, Risperdal and Cogentin)  B   N/A   C   N/A  Target Date: 4 months - 5/10/2019  Person/Persons Responsible for Completion of Goal: Roberto Blevins   Progress Towards Goals: stable  Treatment Modality: medication management every 4 months  Review due 90 to 120 days from date of this plan: 4 months - 5/10/2019  Expected length of service: maintenance  My Physician/PA/NP and I have developed this plan together and I agree to work on the goals and objectives  I understand the treatment goals that were developed for my treatment    Signature:       Date and time:  Signature of parent/guardian if under age of 15 years: Date and time:  Signature of provider:      Date and time:  Signature of Supervising Physician:    Date and time: 1/10/2019      Deborah Vyas MD

## 2019-01-25 RX ORDER — SODIUM CHLORIDE 9 MG/ML
50 INJECTION, SOLUTION INTRAVENOUS CONTINUOUS
Status: CANCELLED | OUTPATIENT
Start: 2019-01-25

## 2019-01-27 ENCOUNTER — TELEPHONE (OUTPATIENT)
Dept: INPATIENT UNIT | Facility: HOSPITAL | Age: 64
End: 2019-01-27

## 2019-01-28 ENCOUNTER — ANESTHESIA EVENT (OUTPATIENT)
Dept: SURGERY | Facility: HOSPITAL | Age: 64
End: 2019-01-28
Payer: COMMERCIAL

## 2019-01-28 ENCOUNTER — HOSPITAL ENCOUNTER (OUTPATIENT)
Dept: NON INVASIVE DIAGNOSTICS | Facility: HOSPITAL | Age: 64
Discharge: HOME | End: 2019-01-28
Attending: INTERNAL MEDICINE | Admitting: INTERNAL MEDICINE
Payer: COMMERCIAL

## 2019-01-28 ENCOUNTER — ANESTHESIA (OUTPATIENT)
Dept: SURGERY | Facility: HOSPITAL | Age: 64
End: 2019-01-28
Payer: COMMERCIAL

## 2019-01-28 VITALS
DIASTOLIC BLOOD PRESSURE: 90 MMHG | SYSTOLIC BLOOD PRESSURE: 149 MMHG | RESPIRATION RATE: 18 BRPM | OXYGEN SATURATION: 98 % | HEART RATE: 95 BPM

## 2019-01-28 DIAGNOSIS — I48.19 PERSISTENT ATRIAL FIBRILLATION (HCC): ICD-10-CM

## 2019-01-28 LAB
ANION GAP SERPL CALCULATED.3IONS-SCNC: 6 MMOL/L (ref 4–13)
ATRIAL RATE: 416 BPM
ATRIAL RATE: 73 BPM
BUN SERPL-MCNC: 19 MG/DL (ref 5–25)
CALCIUM SERPL-MCNC: 8.5 MG/DL (ref 8.3–10.1)
CHLORIDE SERPL-SCNC: 102 MMOL/L (ref 100–108)
CO2 SERPL-SCNC: 28 MMOL/L (ref 21–32)
CREAT SERPL-MCNC: 0.75 MG/DL (ref 0.6–1.3)
ERYTHROCYTE [DISTWIDTH] IN BLOOD BY AUTOMATED COUNT: 14.9 % (ref 11.6–15.1)
GFR SERPL CREATININE-BSD FRML MDRD: 86 ML/MIN/1.73SQ M
GLUCOSE P FAST SERPL-MCNC: 89 MG/DL (ref 65–99)
GLUCOSE SERPL-MCNC: 89 MG/DL (ref 65–140)
HCT VFR BLD AUTO: 40.4 % (ref 34.8–46.1)
HGB BLD-MCNC: 13 G/DL (ref 11.5–15.4)
MAGNESIUM SERPL-MCNC: 2 MG/DL (ref 1.6–2.6)
MCH RBC QN AUTO: 28.1 PG (ref 26.8–34.3)
MCHC RBC AUTO-ENTMCNC: 32.2 G/DL (ref 31.4–37.4)
MCV RBC AUTO: 87 FL (ref 82–98)
P AXIS: 69 DEGREES
PLATELET # BLD AUTO: 193 THOUSANDS/UL (ref 149–390)
PMV BLD AUTO: 11.3 FL (ref 8.9–12.7)
POTASSIUM SERPL-SCNC: 3.6 MMOL/L (ref 3.5–5.3)
PR INTERVAL: 176 MS
QRS AXIS: 24 DEGREES
QRS AXIS: 32 DEGREES
QRSD INTERVAL: 78 MS
QRSD INTERVAL: 82 MS
QT INTERVAL: 364 MS
QT INTERVAL: 376 MS
QTC INTERVAL: 401 MS
QTC INTERVAL: 447 MS
RBC # BLD AUTO: 4.62 MILLION/UL (ref 3.81–5.12)
SODIUM SERPL-SCNC: 136 MMOL/L (ref 136–145)
T WAVE AXIS: 42 DEGREES
T WAVE AXIS: 52 DEGREES
VENTRICULAR RATE: 73 BPM
VENTRICULAR RATE: 85 BPM
WBC # BLD AUTO: 6.27 THOUSAND/UL (ref 4.31–10.16)

## 2019-01-28 PROCEDURE — 93010 ELECTROCARDIOGRAM REPORT: CPT | Performed by: INTERNAL MEDICINE

## 2019-01-28 PROCEDURE — 92960 CARDIOVERSION ELECTRIC EXT: CPT | Performed by: INTERNAL MEDICINE

## 2019-01-28 PROCEDURE — 85027 COMPLETE CBC AUTOMATED: CPT | Performed by: INTERNAL MEDICINE

## 2019-01-28 PROCEDURE — 80048 BASIC METABOLIC PNL TOTAL CA: CPT | Performed by: INTERNAL MEDICINE

## 2019-01-28 PROCEDURE — 93005 ELECTROCARDIOGRAM TRACING: CPT

## 2019-01-28 PROCEDURE — 83735 ASSAY OF MAGNESIUM: CPT | Performed by: INTERNAL MEDICINE

## 2019-01-28 RX ORDER — PROPOFOL 10 MG/ML
INJECTION, EMULSION INTRAVENOUS AS NEEDED
Status: DISCONTINUED | OUTPATIENT
Start: 2019-01-28 | End: 2019-01-28 | Stop reason: SURG

## 2019-01-28 RX ORDER — SODIUM CHLORIDE 9 MG/ML
50 INJECTION, SOLUTION INTRAVENOUS CONTINUOUS
Status: DISCONTINUED | OUTPATIENT
Start: 2019-01-28 | End: 2019-01-28 | Stop reason: HOSPADM

## 2019-01-28 RX ADMIN — PROPOFOL 30 MG: 10 INJECTION, EMULSION INTRAVENOUS at 10:41

## 2019-01-28 RX ADMIN — SODIUM CHLORIDE 50 ML/HR: 0.9 INJECTION, SOLUTION INTRAVENOUS at 09:01

## 2019-01-28 RX ADMIN — PROPOFOL 100 MG: 10 INJECTION, EMULSION INTRAVENOUS at 10:46

## 2019-01-28 NOTE — ANESTHESIA PREPROCEDURE EVALUATION
Review of Systems/Medical History  Patient summary reviewed  Chart reviewed      Cardiovascular  EKG reviewed, Exercise tolerance (METS): >4,  Dysrhythmias , atrial fibrillation,    Pulmonary       GI/Hepatic            Endo/Other     GYN  Negative gynecology ROS          Hematology   Musculoskeletal       Neurology   Psychology       Comment: bipolar         Physical Exam    Airway    Mallampati score: II  TM Distance: >3 FB  Neck ROM: full     Dental       Cardiovascular  Rhythm: irregular,     Pulmonary  Breath sounds clear to auscultation,     Other Findings        Anesthesia Plan  ASA Score- 3     Anesthesia Type- IV sedation with anesthesia with ASA Monitors  Additional Monitors:   Airway Plan:         Plan Factors-    Induction- intravenous  Postoperative Plan-     Informed Consent- Anesthetic plan and risks discussed with patient and spouse

## 2019-01-28 NOTE — ANESTHESIA POSTPROCEDURE EVALUATION
Post-Op Assessment Note      CV Status:  Stable    Mental Status:  Alert and awake    Hydration Status:  Euvolemic    PONV Controlled:  Controlled    Airway Patency:  Patent    Post Op Vitals Reviewed: Yes          Staff: CRNA           BP  105/65    Temp      Pulse  72   Resp   14   SpO2   100   Awake and following commands

## 2019-01-28 NOTE — DISCHARGE INSTRUCTIONS
A-fib (Atrial Fibrillation)   WHAT YOU NEED TO KNOW:   A-fib may come and go, or it may be a long-term condition  A-fib can cause blood clots, stroke, or heart failure  These conditions may become life-threatening  It is important to treat and manage a-fib to help prevent a blood clot, stroke, or heart failure  DISCHARGE INSTRUCTIONS:   Call 911 for any of the following:   · You have any of the following signs of a heart attack:      ¨ Squeezing, pressure, or pain in your chest that lasts longer than 5 minutes or returns    ¨ Discomfort or pain in your back, neck, jaw, stomach, or arm     ¨ Trouble breathing    ¨ Nausea or vomiting    ¨ Lightheadedness or a sudden cold sweat, especially with chest pain or trouble breathing    · You have any of the following signs of a stroke:      ¨ Numbness or drooping on one side of your face     ¨ Weakness in an arm or leg    ¨ Confusion or difficulty speaking    ¨ Dizziness, a severe headache, or vision loss  Return to the emergency department if:  You have any of the following signs of a blood clot:  · You feel lightheaded, are short of breath, and have chest pain  · You cough up blood  · You have swelling, redness, pain, or warmth in your arm or leg  Contact your cardiologist or healthcare provider if:   · Your target heart rate is not in the range it should be  · You have new or worsening swelling in your legs, feet, ankles, or abdomen  · You are short of breath, even at rest      · You have questions or concerns about your condition or care  Medicines: You may need any of the following:  · Heart medicines  help control your heart rate and rhythm  You may need more than one medicine to treat your symptoms  · Blood thinners    help prevent blood clots  Examples of blood thinners include heparin and warfarin  Clots can cause strokes, heart attacks, and death   The following are general safety guidelines to follow while you are taking a blood thinner:    ¨ Watch for bleeding and bruising while you take blood thinners  Watch for bleeding from your gums or nose  Watch for blood in your urine and bowel movements  Use a soft washcloth on your skin, and a soft toothbrush to brush your teeth  This can keep your skin and gums from bleeding  If you shave, use an electric shaver  Do not play contact sports  ¨ Tell your dentist and other healthcare providers that you take anticoagulants  Wear a bracelet or necklace that says you take this medicine  ¨ Do not start or stop any medicines unless your healthcare provider tells you to  Many medicines cannot be used with blood thinners  ¨ Tell your healthcare provider right away if you forget to take the medicine, or if you take too much  ¨ Warfarin  is a blood thinner that you may need to take  The following are things you should be aware of if you take warfarin  § Foods and medicines can affect the amount of warfarin in your blood  Do not make major changes to your diet while you take warfarin  Warfarin works best when you eat about the same amount of vitamin K every day  Vitamin K is found in green leafy vegetables and certain other foods  Ask for more information about what to eat when you are taking warfarin  § You will need to see your healthcare provider for follow-up visits when you are on warfarin  You will need regular blood tests  These tests are used to decide how much medicine you need  · Antiplatelets , such as aspirin, help prevent blood clots  Take your antiplatelet medicine exactly as directed  These medicines make it more likely for you to bleed or bruise  If you are told to take aspirin, do not take acetaminophen or ibuprofen instead  · Take your medicine as directed  Contact your healthcare provider if you think your medicine is not helping or if you have side effects  Tell him or her if you are allergic to any medicine   Keep a list of the medicines, vitamins, and herbs you take  Include the amounts, and when and why you take them  Bring the list or the pill bottles to follow-up visits  Carry your medicine list with you in case of an emergency  Follow up with your cardiologist as directed: You will need regular blood tests and monitoring  Write down your questions so you remember to ask them during your visits  Manage A-fib:   · Know your target heart rate  Learn how to take your pulse and monitor your heart rate  · Manage other health conditions  This includes high blood pressure, sleep apnea, thyroid disease, diabetes, and other heart conditions  Take medicine as directed and follow your treatment plan  · Limit or do not drink alcohol  Alcohol can make a-fib hard to manage  Ask your healthcare provider if it is safe for you to drink alcohol  A drink of alcohol is 12 ounces of beer, 5 ounces of wine, or 1½ ounces of liquor  · Do not smoke  Nicotine and other chemicals in cigarettes and cigars can cause heart and lung damage  Ask your healthcare provider for information if you currently smoke and need help to quit  E-cigarettes or smokeless tobacco still contain nicotine  Talk to your healthcare provider before you use these products  · Eat heart-healthy foods  Heart healthy foods will help keep your cholesterol low  These include fruits, vegetables, whole-grain breads, low-fat dairy products, beans, lean meats, and fish  Replace butter and margarine with heart-healthy oils such as olive oil and canola oil  · Maintain a healthy weight  Ask your healthcare provider how much you should weigh  Ask him to help you create a weight loss plan if you are overweight  · Exercise for 30 minutes  most days of the week  Ask your healthcare provider about the best exercise plan for you  © 2017 [address] [employer] Information is for End User's use only and may not be sold, redistributed or otherwise used for commercial purposes   All illustrations and images included in CareNotes® are the copyrighted property of A D A M [employer] Inc  or Guillermo Javier  The above information is an  only  It is not intended as medical advice for individual conditions or treatments  Talk to your doctor, nurse or pharmacist before following any medical regimen to see if it is safe and effective for you

## 2019-02-25 ENCOUNTER — OFFICE VISIT (OUTPATIENT)
Dept: CARDIOLOGY CLINIC | Facility: CLINIC | Age: 64
End: 2019-02-25
Payer: COMMERCIAL

## 2019-02-25 VITALS
HEART RATE: 80 BPM | SYSTOLIC BLOOD PRESSURE: 102 MMHG | WEIGHT: 197.5 LBS | HEIGHT: 70 IN | BODY MASS INDEX: 28.27 KG/M2 | DIASTOLIC BLOOD PRESSURE: 70 MMHG

## 2019-02-25 DIAGNOSIS — I48.0 PAROXYSMAL ATRIAL FIBRILLATION (HCC): Primary | ICD-10-CM

## 2019-02-25 PROCEDURE — 99214 OFFICE O/P EST MOD 30 MIN: CPT | Performed by: INTERNAL MEDICINE

## 2019-02-25 PROCEDURE — 93000 ELECTROCARDIOGRAM COMPLETE: CPT | Performed by: INTERNAL MEDICINE

## 2019-02-25 RX ORDER — ACETAMINOPHEN 325 MG/1
650 TABLET ORAL EVERY 6 HOURS PRN
COMMUNITY

## 2019-02-25 NOTE — PROGRESS NOTES
Cardiology Follow Up    Claudetta Dress  1955  [id number]  [address] [facility] [address] [place] [address]  [phone]    1  Paroxysmal atrial fibrillation (HCC)  POCT ECG    NM myocardial perfusion spect (stress and/or rest)    Diagnostic Sleep Study       Interval History:     Mrs Vitale comes in for paroxysmal atrial fibrillation  I saw her in consultation a few weeks ago for for this, while she was in the hospital   While following up with her PCP for hyponatremia, irregular rhythm was detected in an ECG showed atrial fibrillation with rapid ventricular response  During her hospitalization metoprolol was added, and we up titrate this and her heart rates were under good control  There were also some issues with hematuria, which seem to have resolved  She is now on Eliquis for stroke prevention  After our 1st follow-up, I set her up for a cardioversion which was performed last month  This was only transiently successful as she comes back today in atrial fibrillation  She does continue to have some shortness of breath, but it appears improved with improved heart rates  She is complaining of some on and off chest tightness, but this has improved significantly since getting heart rates under better control  She does not appear to have any palpitations at this point  No signs or symptoms of CHF  She denies lightheadedness or any a recent syncope  She does convey several features of obstructive sleep apnea which includes daytime fatigue and tiredness, and she is a snorer        Patient Active Problem List   Diagnosis    Bipolar I disorder, most recent episode mixed, in full remission ([facility] [address] )    Extrapyramidal reaction    Therapeutic drug monitoring    Screening for breast cancer    Screen for colon cancer    Paroxysmal atrial fibrillation ([facility] [address] )    Gross hematuria     Past Medical History: Diagnosis Date    Atrial fibrillation ([facility] [address] [facility]     Psychiatric disorder     bipolar    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    Varicose veins of legs      Social History     Socioeconomic History    Marital status: /Civil Union     Spouse name: Not on file    Number of children: 1    Years of education: 15    Highest education level: Not on file   Occupational History    Occupation: unemployed   Social Needs    Financial resource strain: Not on file    Food insecurity:     Worry: Not on file     Inability: Not on file   [employer] needs:     Medical: Not on file     Non-medical: Not on file   Tobacco Use    Smoking status: Former Smoker    Smokeless tobacco: Never Used   Substance and Sexual Activity    Alcohol use: No    Drug use: No     Comment: History of cannabis use years ago   No current recreational drug use    Sexual activity: Yes     Partners: Male   Lifestyle    Physical activity:     Days per week: Not on file     Minutes per session: Not on file    Stress: Not on file   Relationships    Social connections:     Talks on phone: Not on file     Gets together: Not on file     Attends Buddhism service: Not on file     Active member of club or organization: Not on file     Attends meetings of clubs or organizations: Not on file     Relationship status: Not on file    Intimate partner violence:     Fear of current or ex partner: Not on file     Emotionally abused: Not on file     Physically abused: Not on file     Forced sexual activity: Not on file   Other Topics Concern    Not on file   Social History Narrative    Education: high school graduate    Learning Disabilities: none    Marital History:     Children: 1 adult son    Living Arrangement: lives in home with     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, unemployed    Functioning Relationships: good support system,  is supportive    Legal History: none     History: None    Always uses seat belt       Family History   Problem Relation Age of Onset    Psychiatric Illness Maternal Aunt     Breast cancer Maternal Aunt     Stroke Mother         CVA    Heart failure Mother     Diabetes Mother     Hypertension Mother     Thyroid disease Mother     Gallbladder disease Mother         gallstones    Diabetes Brother     Multiple sclerosis Brother     Diabetes Maternal Grandmother     Breast cancer Cousin      Past Surgical History:   Procedure Laterality Date     SECTION         Current Outpatient Medications:     acetaminophen (TYLENOL) 325 mg tablet, Take 650 mg by mouth every 6 (six) hours as needed for mild pain, Disp: , Rfl:     apixaban (ELIQUIS) 5 mg, Take 1 tablet (5 mg total) by mouth 2 (two) times a day, Disp: 60 tablet, Rfl: 6    ascorbic acid (VITAMIN C) 500 mg tablet, Take 500 mg by mouth daily, Disp: , Rfl:     benztropine (COGENTIN) 1 mg tablet, Take 1 tablet (1 mg total) by mouth 2 (two) times a day for 150 days, Disp: 60 tablet, Rfl: 4    divalproex sodium (DEPAKOTE ER) 500 mg 24 hr tablet, Take 1 tablet (500 mg total) by mouth every evening for 150 days, Disp: 30 tablet, Rfl: 4    metoprolol tartrate (LOPRESSOR) 50 mg tablet, Take 1 tablet (50 mg total) by mouth every 12 (twelve) hours, Disp: 60 tablet, Rfl: 6    Multiple Vitamin (MULTI-VITAMIN DAILY) TABS, Take 1 tablet by mouth daily, Disp: , Rfl:     risperiDONE (RisperDAL) 1 mg tablet, Take 1 tablet (1 mg total) by mouth every evening for 150 days, Disp: 30 tablet, Rfl: 4  Allergies   Allergen Reactions    Levaquin [Levofloxacin]      "heavy legs"    Ampicillin Rash     Category: Allergy;     Clindamycin Rash     Category:  Allergy;        Labs:  Lab Results   Component Value Date     (L) 2016    K 3 6 2019    K 4 3 2016     2019     2016    CO2 28 2019    CO2 30 2016    BUN 19 2019    BUN 16 2016    CREATININE 0 75 2019 CREATININE 0 71 01/08/2016    GLUCOSE 89 01/08/2016    CALCIUM 8 5 01/28/2019    CALCIUM 8 9 01/08/2016     Lab Results   Component Value Date    WBC 6 27 01/28/2019    WBC 7 70 01/08/2016    HGB 13 0 01/28/2019    HGB 12 2 01/08/2016    HCT 40 4 01/28/2019    HCT 37 8 01/08/2016    MCV 87 01/28/2019    MCV 87 01/08/2016     01/28/2019     01/08/2016     Lab Results   Component Value Date    TRIG 84 11/08/2018    HDL 42 11/08/2018     Imaging:  ECG obtained today demonstrates atrial fibrillation  ECHO:  LEFT VENTRICLE:  Systolic function was normal  Ejection fraction was estimated to be 65 %  There were no regional wall motion abnormalities      LEFT ATRIUM:  The atrium was mildly dilated      RIGHT ATRIUM:  The atrium was mildly dilated      MITRAL VALVE:  There was mild annular calcification  There was trace regurgitation      TRICUSPID VALVE:  There was mild regurgitation  Pulmonary artery systolic pressure was within the normal range  Review of Systems:  Review of Systems   Constitutional: Positive for fatigue  HENT: Negative  Eyes: Negative  Respiratory: Positive for chest tightness  Cardiovascular: Negative  Gastrointestinal: Negative  Musculoskeletal: Negative  Skin: Negative  Allergic/Immunologic: Negative  Neurological: Negative  Hematological: Negative  Psychiatric/Behavioral: Positive for sleep disturbance  The patient is nervous/anxious  All other systems reviewed and are negative  Physical Exam:  Physical Exam   Constitutional: She is oriented to person, place, and time  She appears well-developed and well-nourished  HENT:   Head: Normocephalic and atraumatic  Eyes: Pupils are equal, round, and reactive to light  EOM are normal  Right eye exhibits no discharge  Left eye exhibits no discharge  No scleral icterus  Neck: Normal range of motion  Neck supple  No JVD present  No thyromegaly present     Cardiovascular: Normal rate, S1 normal, S2 normal, normal heart sounds, intact distal pulses and normal pulses  An irregularly irregular rhythm present  Exam reveals no gallop and no friction rub  No murmur heard  Pulmonary/Chest: Effort normal and breath sounds normal  No respiratory distress  She has no wheezes  She has no rales  Abdominal: Soft  Bowel sounds are normal  She exhibits no distension  There is no tenderness  Musculoskeletal: Normal range of motion  She exhibits no edema, tenderness or deformity  Neurological: She is alert and oriented to person, place, and time  No cranial nerve deficit  Skin: Skin is warm and dry  No rash noted  Psychiatric: She has a normal mood and affect  Judgment and thought content normal    Nursing note and vitals reviewed  Discussion/Summary:    Bridget Rhoades went through a DC cardioversion last month which was only transiently successful  Her heart rates are under good control but she is back in atrial fibrillation  She is fortunately minimally symptomatic at this time  At this point she may need anti rhythmic therapy if we continue with a rhythm control strategy  However, she is can vein several features of obstructive sleep apnea and therefore we ordered a sleep study today  I am also going to have her go through an ischemic evaluation given her on and off chest tightness, and the possible need for class 1C anti rhythmic therapy  She will remain on Eliquis for stroke prevention  We will bring her back for follow-up in a few months after her testing  Counseling / Coordination of Care  Total office / unit time spent today 25 minutes  Greater than 50% of total time was spent with the patient and / or family counseling and / or coordination of care

## 2019-02-25 NOTE — PATIENT INSTRUCTIONS

## 2019-02-27 ENCOUNTER — TELEPHONE (OUTPATIENT)
Dept: SLEEP CENTER | Facility: CLINIC | Age: 64
End: 2019-02-27

## 2019-02-27 NOTE — TELEPHONE ENCOUNTER
----- Message from Magaly Orozco MD sent at 2/26/2019  5:12 PM EST -----  Approved  ----- Message -----  From: Jessica Arevalo: 2/26/2019   2:38 PM  To: Sleep Medicine [facility] [place] [facility], #    PLEASE REVIEW FOR APPROVAL OR DENIAL AND WHY

## 2019-03-11 ENCOUNTER — HOSPITAL ENCOUNTER (OUTPATIENT)
Dept: NON INVASIVE DIAGNOSTICS | Facility: CLINIC | Age: 64
Discharge: HOME | End: 2019-03-11
Payer: COMMERCIAL

## 2019-03-11 ENCOUNTER — TELEPHONE (OUTPATIENT)
Dept: CARDIOLOGY CLINIC | Facility: CLINIC | Age: 64
End: 2019-03-11

## 2019-03-11 DIAGNOSIS — I48.0 PAROXYSMAL ATRIAL FIBRILLATION (HCC): ICD-10-CM

## 2019-03-11 PROCEDURE — 78452 HT MUSCLE IMAGE SPECT MULT: CPT

## 2019-03-11 PROCEDURE — 93017 CV STRESS TEST TRACING ONLY: CPT

## 2019-03-11 PROCEDURE — 78452 HT MUSCLE IMAGE SPECT MULT: CPT | Performed by: INTERNAL MEDICINE

## 2019-03-11 PROCEDURE — 93016 CV STRESS TEST SUPVJ ONLY: CPT | Performed by: INTERNAL MEDICINE

## 2019-03-11 PROCEDURE — 93018 CV STRESS TEST I&R ONLY: CPT | Performed by: INTERNAL MEDICINE

## 2019-03-11 PROCEDURE — A9502 TC99M TETROFOSMIN: HCPCS

## 2019-03-11 RX ADMIN — REGADENOSON 0.4 MG: 0.08 INJECTION, SOLUTION INTRAVENOUS at 14:40

## 2019-03-11 NOTE — TELEPHONE ENCOUNTER
Insurance denied her sleep study  For peer to peer call [phone] order# [id number]  A home study would not need authorization  What would you like to do?  Thanks

## 2019-03-12 DIAGNOSIS — I48.91 ATRIAL FIBRILLATION, UNSPECIFIED TYPE (HCC): Primary | ICD-10-CM

## 2019-03-12 DIAGNOSIS — R53.83 FATIGUE, UNSPECIFIED TYPE: ICD-10-CM

## 2019-03-14 LAB
CHEST PAIN STATEMENT: NORMAL
MAX DIASTOLIC BP: 80 MMHG
MAX HEART RATE: 210 BPM
MAX PREDICTED HEART RATE: 158 BPM
MAX. SYSTOLIC BP: 162 MMHG
PROTOCOL NAME: NORMAL
REASON FOR TERMINATION: NORMAL
TARGET HR FORMULA: NORMAL
TEST INDICATION: NORMAL
TIME IN EXERCISE PHASE: NORMAL

## 2019-04-04 ENCOUNTER — TELEPHONE (OUTPATIENT)
Dept: CARDIOLOGY CLINIC | Facility: CLINIC | Age: 64
End: 2019-04-04

## 2019-04-08 ENCOUNTER — TELEPHONE (OUTPATIENT)
Dept: CARDIOLOGY CLINIC | Facility: CLINIC | Age: 64
End: 2019-04-08

## 2019-04-25 ENCOUNTER — HOSPITAL ENCOUNTER (OUTPATIENT)
Dept: SLEEP CENTER | Facility: CLINIC | Age: 64
Discharge: HOME | End: 2019-04-25
Payer: COMMERCIAL

## 2019-04-25 DIAGNOSIS — I48.91 ATRIAL FIBRILLATION, UNSPECIFIED TYPE (HCC): ICD-10-CM

## 2019-04-25 DIAGNOSIS — R53.83 FATIGUE, UNSPECIFIED TYPE: ICD-10-CM

## 2019-04-25 PROCEDURE — G0399 HOME SLEEP TEST/TYPE 3 PORTA: HCPCS

## 2019-04-28 DIAGNOSIS — G47.33 OSA (OBSTRUCTIVE SLEEP APNEA): Primary | ICD-10-CM

## 2019-04-29 ENCOUNTER — TELEPHONE (OUTPATIENT)
Dept: SLEEP CENTER | Facility: CLINIC | Age: 64
End: 2019-04-29

## 2019-04-29 ENCOUNTER — TRANSCRIBE ORDERS (OUTPATIENT)
Dept: SLEEP CENTER | Facility: CLINIC | Age: 64
End: 2019-04-29

## 2019-04-30 ENCOUNTER — TELEPHONE (OUTPATIENT)
Dept: CARDIOLOGY CLINIC | Facility: CLINIC | Age: 64
End: 2019-04-30

## 2019-05-01 ENCOUNTER — TELEPHONE (OUTPATIENT)
Dept: FAMILY MEDICINE CLINIC | Facility: CLINIC | Age: 64
End: 2019-05-01

## 2019-05-01 ENCOUNTER — APPOINTMENT (OUTPATIENT)
Dept: LAB | Facility: MEDICAL CENTER | Age: 64
End: 2019-05-01
Payer: COMMERCIAL

## 2019-05-01 DIAGNOSIS — F31.78 BIPOLAR I DISORDER, MOST RECENT EPISODE MIXED, IN FULL REMISSION (HCC): Chronic | ICD-10-CM

## 2019-05-01 DIAGNOSIS — Z51.81 THERAPEUTIC DRUG MONITORING: Chronic | ICD-10-CM

## 2019-05-01 LAB
ALBUMIN SERPL BCP-MCNC: 3.7 G/DL (ref 3.5–5)
ALP SERPL-CCNC: 64 U/L (ref 46–116)
ALT SERPL W P-5'-P-CCNC: 16 U/L (ref 12–78)
ANION GAP SERPL CALCULATED.3IONS-SCNC: 3 MMOL/L (ref 4–13)
AST SERPL W P-5'-P-CCNC: 12 U/L (ref 5–45)
BASOPHILS # BLD AUTO: 0.06 THOUSANDS/ΜL (ref 0–0.1)
BASOPHILS NFR BLD AUTO: 1 % (ref 0–1)
BILIRUB SERPL-MCNC: 0.76 MG/DL (ref 0.2–1)
BUN SERPL-MCNC: 16 MG/DL (ref 5–25)
CALCIUM SERPL-MCNC: 8.5 MG/DL (ref 8.3–10.1)
CHLORIDE SERPL-SCNC: 99 MMOL/L (ref 100–108)
CO2 SERPL-SCNC: 31 MMOL/L (ref 21–32)
CREAT SERPL-MCNC: 0.75 MG/DL (ref 0.6–1.3)
EOSINOPHIL # BLD AUTO: 0.16 THOUSAND/ΜL (ref 0–0.61)
EOSINOPHIL NFR BLD AUTO: 3 % (ref 0–6)
ERYTHROCYTE [DISTWIDTH] IN BLOOD BY AUTOMATED COUNT: 14.4 % (ref 11.6–15.1)
GFR SERPL CREATININE-BSD FRML MDRD: 86 ML/MIN/1.73SQ M
GLUCOSE P FAST SERPL-MCNC: 80 MG/DL (ref 65–99)
HCT VFR BLD AUTO: 44.4 % (ref 34.8–46.1)
HGB BLD-MCNC: 14 G/DL (ref 11.5–15.4)
IMM GRANULOCYTES # BLD AUTO: 0.01 THOUSAND/UL (ref 0–0.2)
IMM GRANULOCYTES NFR BLD AUTO: 0 % (ref 0–2)
LYMPHOCYTES # BLD AUTO: 2.33 THOUSANDS/ΜL (ref 0.6–4.47)
LYMPHOCYTES NFR BLD AUTO: 40 % (ref 14–44)
MCH RBC QN AUTO: 27.9 PG (ref 26.8–34.3)
MCHC RBC AUTO-ENTMCNC: 31.5 G/DL (ref 31.4–37.4)
MCV RBC AUTO: 89 FL (ref 82–98)
MONOCYTES # BLD AUTO: 0.6 THOUSAND/ΜL (ref 0.17–1.22)
MONOCYTES NFR BLD AUTO: 10 % (ref 4–12)
NEUTROPHILS # BLD AUTO: 2.66 THOUSANDS/ΜL (ref 1.85–7.62)
NEUTS SEG NFR BLD AUTO: 46 % (ref 43–75)
NRBC BLD AUTO-RTO: 0 /100 WBCS
PLATELET # BLD AUTO: 206 THOUSANDS/UL (ref 149–390)
PMV BLD AUTO: 11.2 FL (ref 8.9–12.7)
POTASSIUM SERPL-SCNC: 4 MMOL/L (ref 3.5–5.3)
PROT SERPL-MCNC: 7.2 G/DL (ref 6.4–8.2)
RBC # BLD AUTO: 5.01 MILLION/UL (ref 3.81–5.12)
SODIUM SERPL-SCNC: 133 MMOL/L (ref 136–145)
VALPROATE SERPL-MCNC: 73 UG/ML (ref 50–100)
WBC # BLD AUTO: 5.82 THOUSAND/UL (ref 4.31–10.16)

## 2019-05-01 PROCEDURE — 36415 COLL VENOUS BLD VENIPUNCTURE: CPT

## 2019-05-01 PROCEDURE — 80053 COMPREHEN METABOLIC PANEL: CPT

## 2019-05-01 PROCEDURE — 80164 ASSAY DIPROPYLACETIC ACD TOT: CPT

## 2019-05-01 PROCEDURE — 85025 COMPLETE CBC W/AUTO DIFF WBC: CPT

## 2019-05-09 ENCOUNTER — OFFICE VISIT (OUTPATIENT)
Dept: PSYCHIATRY | Facility: CLINIC | Age: 64
End: 2019-05-09
Payer: COMMERCIAL

## 2019-05-09 VITALS
DIASTOLIC BLOOD PRESSURE: 70 MMHG | BODY MASS INDEX: 28.49 KG/M2 | HEART RATE: 90 BPM | WEIGHT: 199 LBS | HEIGHT: 70 IN | SYSTOLIC BLOOD PRESSURE: 109 MMHG

## 2019-05-09 DIAGNOSIS — F31.78 BIPOLAR I DISORDER, MOST RECENT EPISODE MIXED, IN FULL REMISSION (HCC): Primary | Chronic | ICD-10-CM

## 2019-05-09 DIAGNOSIS — G25.9 EXTRAPYRAMIDAL REACTION: Chronic | ICD-10-CM

## 2019-05-09 DIAGNOSIS — Z51.81 THERAPEUTIC DRUG MONITORING: Chronic | ICD-10-CM

## 2019-05-09 PROCEDURE — 90833 PSYTX W PT W E/M 30 MIN: CPT | Performed by: PSYCHIATRY & NEUROLOGY

## 2019-05-09 PROCEDURE — 99213 OFFICE O/P EST LOW 20 MIN: CPT | Performed by: PSYCHIATRY & NEUROLOGY

## 2019-05-09 RX ORDER — BENZTROPINE MESYLATE 1 MG/1
1 TABLET ORAL 2 TIMES DAILY
Qty: 60 TABLET | Refills: 4 | Status: SHIPPED | OUTPATIENT
Start: 2019-05-09 | End: 2019-09-19 | Stop reason: SDUPTHER

## 2019-05-09 RX ORDER — RISPERIDONE 1 MG/1
1 TABLET, FILM COATED ORAL EVERY EVENING
Qty: 30 TABLET | Refills: 4 | Status: SHIPPED | OUTPATIENT
Start: 2019-05-09 | End: 2019-09-19 | Stop reason: SDUPTHER

## 2019-05-09 RX ORDER — DIVALPROEX SODIUM 500 MG/1
500 TABLET, EXTENDED RELEASE ORAL EVERY EVENING
Qty: 30 TABLET | Refills: 4 | Status: SHIPPED | OUTPATIENT
Start: 2019-05-09 | End: 2019-09-19 | Stop reason: SDUPTHER

## 2019-05-22 ENCOUNTER — OFFICE VISIT (OUTPATIENT)
Dept: CARDIOLOGY CLINIC | Facility: CLINIC | Age: 64
End: 2019-05-22
Payer: COMMERCIAL

## 2019-05-22 VITALS
SYSTOLIC BLOOD PRESSURE: 108 MMHG | BODY MASS INDEX: 28.49 KG/M2 | WEIGHT: 199 LBS | HEART RATE: 86 BPM | OXYGEN SATURATION: 97 % | DIASTOLIC BLOOD PRESSURE: 64 MMHG | HEIGHT: 70 IN

## 2019-05-22 DIAGNOSIS — G47.33 OSA (OBSTRUCTIVE SLEEP APNEA): ICD-10-CM

## 2019-05-22 DIAGNOSIS — I48.0 PAROXYSMAL ATRIAL FIBRILLATION (HCC): Primary | ICD-10-CM

## 2019-05-22 PROCEDURE — 99214 OFFICE O/P EST MOD 30 MIN: CPT | Performed by: INTERNAL MEDICINE

## 2019-05-22 PROCEDURE — 93000 ELECTROCARDIOGRAM COMPLETE: CPT | Performed by: INTERNAL MEDICINE

## 2019-06-06 ENCOUNTER — OFFICE VISIT (OUTPATIENT)
Dept: SLEEP CENTER | Facility: CLINIC | Age: 64
End: 2019-06-06
Payer: COMMERCIAL

## 2019-06-06 VITALS
HEART RATE: 80 BPM | WEIGHT: 199 LBS | BODY MASS INDEX: 28.49 KG/M2 | DIASTOLIC BLOOD PRESSURE: 76 MMHG | HEIGHT: 70 IN | SYSTOLIC BLOOD PRESSURE: 110 MMHG

## 2019-06-06 DIAGNOSIS — G47.33 OSA (OBSTRUCTIVE SLEEP APNEA): Primary | ICD-10-CM

## 2019-06-06 PROCEDURE — 99204 OFFICE O/P NEW MOD 45 MIN: CPT | Performed by: INTERNAL MEDICINE

## 2019-07-11 DIAGNOSIS — I48.91 ATRIAL FIBRILLATION WITH RVR (HCC): ICD-10-CM

## 2019-07-11 RX ORDER — METOPROLOL TARTRATE 50 MG/1
50 TABLET, FILM COATED ORAL EVERY 12 HOURS SCHEDULED
Qty: 60 TABLET | Refills: 6 | Status: SHIPPED | OUTPATIENT
Start: 2019-07-11 | End: 2020-01-17 | Stop reason: SDUPTHER

## 2019-08-06 ENCOUNTER — OFFICE VISIT (OUTPATIENT)
Dept: SLEEP CENTER | Facility: CLINIC | Age: 64
End: 2019-08-06
Payer: COMMERCIAL

## 2019-08-06 VITALS
DIASTOLIC BLOOD PRESSURE: 60 MMHG | WEIGHT: 204 LBS | HEART RATE: 80 BPM | HEIGHT: 70 IN | BODY MASS INDEX: 29.2 KG/M2 | SYSTOLIC BLOOD PRESSURE: 120 MMHG

## 2019-08-06 DIAGNOSIS — G47.33 OSA (OBSTRUCTIVE SLEEP APNEA): Primary | ICD-10-CM

## 2019-08-06 PROCEDURE — 99214 OFFICE O/P EST MOD 30 MIN: CPT | Performed by: INTERNAL MEDICINE

## 2019-08-06 NOTE — PROGRESS NOTES
Progress Note - Sleep Center   Claudia Raymundo CIZ: MRN: [id number]      Reason for Visit:  59 y  o female here for PAP compliance check    Assessment:  Doing well with new PAP device  Sleep quality is improved and the patient feels less drowsy  Compliance data show utilization for greater than or equal to 70% of nights, for greater than or equal to 4 hours per night  For cough associated with waking in the morning, I have recommended Flonase at bedtime  Most likely she has rhinitis secondary to irritation from the air from the APAP  Furthermore, she has some ear pain which may be caused by barotrauma  I have changed her to APAP 4 to 10 cm, decreased from 4 to 20 cm  Plan:  Adequate compliance and successful treatment    Follow up: One year    History of Present Illness:  History of DOMENIC on PAP therapy  Meets adequate compliance          Review of Systems      Genitourinary post menopausal (no peroids)   Cardiology Frequent chest pain or angina,  and ankle/leg swelling   Gastrointestinal frequent heartburn/acid reflux   Neurology frequent headaches and muscle weakness   Constitutional fatigue and weight change   Integumentary rash or dry skin   Psychiatry none   Musculoskeletal none   Pulmonary shortness of breath with activity, chest tightness and frequent cough   ENT throat clearing   Endocrine none   Hematological none           I have reviewed and updated the review of systems as necessary    Historical Information    Past Medical History:   Diagnosis Date    Atrial fibrillation ([address] )     Psychiatric disorder     bipolar    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    Sleep apnea     Varicose veins of legs          Past Surgical History:   Procedure Laterality Date    CARDIOVERSION       SECTION           Social History     Socioeconomic History    Marital status: /Civil Union     Spouse name: None    Number of children: 1    Years of education: 12    Highest education level: 12th grade   Occupational History    Occupation: unemployed   Social Needs    Financial resource strain: Not hard at all   [address] insecurity:     Worry: Never true     Inability: Never true   [employer] needs:     Medical: No     Non-medical: No   Tobacco Use    Smoking status: Former Smoker    Smokeless tobacco: Never Used   Substance and Sexual Activity    Alcohol use: No    Drug use: No     Comment: History of cannabis use years ago  No current recreational drug use    Sexual activity: Yes     Partners: Male   Lifestyle    Physical activity:     Days per week: 7 days     Minutes per session: 90 min    Stress:  Only a little   Relationships    Social connections:     Talks on phone: Twice a week     Gets together: Twice a week     Attends Sikhism service: Never     Active member of club or organization: No     Attends meetings of clubs or organizations: Never     Relationship status:     Intimate partner violence:     Fear of current or ex partner: No     Emotionally abused: No     Physically abused: No     Forced sexual activity: No   Other Topics Concern    None   Social History Narrative    Education: high school graduate    Learning Disabilities: none    Marital History:     Children: 1 adult son    Living Arrangement: lives in home with     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, unemployed    Functioning Relationships: good support system,  is supportive    Legal History: none     History: None    Always uses seat belt          Family History   Problem Relation Age of Onset    Psychiatric Illness Maternal Aunt     Breast cancer Maternal Aunt     Stroke Mother         CVA    Heart failure Mother     Diabetes Mother     Hypertension Mother     Thyroid disease Mother     Gallbladder disease Mother         gallstones    Diabetes Brother     Multiple sclerosis Brother     Diabetes Maternal Grandmother     Breast cancer Cousin Medications/Allergies:      Current Outpatient Medications:     acetaminophen (TYLENOL) 325 mg tablet, Take 650 mg by mouth every 6 (six) hours as needed for mild pain, Disp: , Rfl:     apixaban (ELIQUIS) 5 mg, Take 1 tablet (5 mg total) by mouth 2 (two) times a day, Disp: 60 tablet, Rfl: 6    ascorbic acid (VITAMIN C) 500 mg tablet, Take 500 mg by mouth daily, Disp: , Rfl:     benztropine (COGENTIN) 1 mg tablet, Take 1 tablet (1 mg total) by mouth 2 (two) times a day for 150 days, Disp: 60 tablet, Rfl: 4    divalproex sodium (DEPAKOTE ER) 500 mg 24 hr tablet, Take 1 tablet (500 mg total) by mouth every evening for 150 days, Disp: 30 tablet, Rfl: 4    metoprolol tartrate (LOPRESSOR) 50 mg tablet, Take 1 tablet (50 mg total) by mouth every 12 (twelve) hours, Disp: 60 tablet, Rfl: 6    Multiple Vitamin (MULTI-VITAMIN DAILY) TABS, Take 1 tablet by mouth daily, Disp: , Rfl:     risperiDONE (RisperDAL) 1 mg tablet, Take 1 tablet (1 mg total) by mouth every evening for 150 days, Disp: 30 tablet, Rfl: 4      Objective    Vital Signs:   Vitals:    08/06/19 1400   BP: 120/60   Pulse: 80     Jacobson Sleepiness Scale: Total score: 8        Physical Exam:    General: Alert, appropriate, cooperative, overweight    Head: NC/AT    Skin: Warm, dry    Neuro: No motor abnormalities, cranial nerves appear intact    Extremity: No clubbing, cyanosis    PAP setting:   APAP 4 to 10 cm  DME Provider: "[employer]  Test results:  LILLIE = 10 5    Counseling / Coordination of Care  Total clinic time spent today 30 minutes  A description of the counseling / coordination of care: Maintain compliance  Discussed equipment  FERNANDO Muniz    Board Certified Sleep Specialist

## 2019-08-08 ENCOUNTER — TELEPHONE (OUTPATIENT)
Dept: SLEEP CENTER | Facility: CLINIC | Age: 64
End: 2019-08-08

## 2019-08-08 NOTE — TELEPHONE ENCOUNTER
Received an RX for a pressure change  Changed accordingly  PT's now set to an APAP of 4-10cm   Called PT and left a message

## 2019-09-06 ENCOUNTER — OFFICE VISIT (OUTPATIENT)
Dept: CARDIOLOGY CLINIC | Facility: CLINIC | Age: 64
End: 2019-09-06
Payer: COMMERCIAL

## 2019-09-06 VITALS
WEIGHT: 209 LBS | SYSTOLIC BLOOD PRESSURE: 106 MMHG | DIASTOLIC BLOOD PRESSURE: 70 MMHG | HEIGHT: 70 IN | BODY MASS INDEX: 29.92 KG/M2 | HEART RATE: 78 BPM

## 2019-09-06 DIAGNOSIS — I48.19 PERSISTENT ATRIAL FIBRILLATION (HCC): Primary | ICD-10-CM

## 2019-09-06 DIAGNOSIS — G47.33 OSA (OBSTRUCTIVE SLEEP APNEA): ICD-10-CM

## 2019-09-06 PROCEDURE — 99214 OFFICE O/P EST MOD 30 MIN: CPT | Performed by: INTERNAL MEDICINE

## 2019-09-06 PROCEDURE — 93000 ELECTROCARDIOGRAM COMPLETE: CPT | Performed by: INTERNAL MEDICINE

## 2019-09-06 NOTE — PATIENT INSTRUCTIONS

## 2019-09-06 NOTE — PROGRESS NOTES
Cardiology Follow Up    Claudia Raymundo  1955  [id number]  [address] [facility] [address] [facility],[address] Tegan Jordan8 [phone]    1  Persistent atrial fibrillation (HCC)  POCT ECG   2  DOMENIC (obstructive sleep apnea)         Interval History:     Mrs Vitale comes in for paroxysmal atrial fibrillation  I saw her in consultation a few weeks ago for for this, while she was in the hospital   While following up with her PCP for hyponatremia, irregular rhythm was detected in an ECG showed atrial fibrillation with rapid ventricular response  During her hospitalization metoprolol was added, and we up titrate this and her heart rates were under good control  There were also some issues with hematuria, which seem to have resolved  She has tolerated Eliquis for stroke prevention  After our 1st follow-up, I set her up for a cardioversion  This was only transiently successful, as she arrived back in atrial fibrillation in follow-up  Her heart rates were well controlled, and her symptoms were improved  She also had showed some features of obstructive sleep apnea, so we continue rate control strategy as we worked up sleep apnea  Sleep study did show mild obstructive sleep apnea, and she has follow-up with sleep medicine early next month  Shauna Riley states she does continue to feel better compared to her hospitalization  Her shortness of breath is improved  She is still complaining of some on and off chest tightness, but this has improved significantly since getting heart rates under better control  She does not appear to have any palpitations at this point  No signs or symptoms of CHF  She denies lightheadedness or any a recent syncope  She does convey several features of obstructive sleep apnea which includes daytime fatigue and tiredness, and she is a snorer        Patient Active Problem List   Diagnosis    Bipolar I disorder, most recent episode mixed, in full remission ([facility] )    Extrapyramidal reaction    Therapeutic drug monitoring    Screening for breast cancer    Screen for colon cancer    Paroxysmal atrial fibrillation (HCC)    Gross hematuria    DOMENIC (obstructive sleep apnea)     Past Medical History:   Diagnosis Date    Atrial fibrillation ([facility] )     Psychiatric disorder     bipolar    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    Sleep apnea     Varicose veins of legs      Social History     Socioeconomic History    Marital status: /Civil Union     Spouse name: Not on file    Number of children: 1    Years of education: 15    Highest education level: 12th grade   Occupational History    Occupation: unemployed   Social Needs    Financial resource strain: Not hard at all   MOOI insecurity:     Worry: Never true     Inability: Never true    Transportation needs:     Medical: No     Non-medical: No   Tobacco Use    Smoking status: Former Smoker    Smokeless tobacco: Never Used   Substance and Sexual Activity    Alcohol use: No    Drug use: No     Comment: History of cannabis use years ago  No current recreational drug use    Sexual activity: Yes     Partners: Male   Lifestyle    Physical activity:     Days per week: 7 days     Minutes per session: 90 min    Stress:  Only a little   Relationships    Social connections:     Talks on phone: Twice a week     Gets together: Twice a week     Attends Catholic service: Never     Active member of club or organization: No     Attends meetings of clubs or organizations: Never     Relationship status:     Intimate partner violence:     Fear of current or ex partner: No     Emotionally abused: No     Physically abused: No     Forced sexual activity: No   Other Topics Concern    Not on file   Social History Narrative    Education: high school graduate    Learning Disabilities: none    Marital History:     Children: 1 adult son    Living Arrangement: lives in home with     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, unemployed    Functioning Relationships: good support system,  is supportive    Legal History: none     History: None    Always uses seat belt       Family History   Problem Relation Age of Onset    Psychiatric Illness Maternal Aunt     Breast cancer Maternal Aunt     Stroke Mother         CVA    Heart failure Mother     Diabetes Mother     Hypertension Mother     Thyroid disease Mother     Gallbladder disease Mother         gallstones    Diabetes Brother     Multiple sclerosis Brother     Diabetes Maternal Grandmother     Breast cancer Cousin      Past Surgical History:   Procedure Laterality Date    CARDIOVERSION       SECTION         Current Outpatient Medications:     acetaminophen (TYLENOL) 325 mg tablet, Take 650 mg by mouth every 6 (six) hours as needed for mild pain, Disp: , Rfl:     apixaban (ELIQUIS) 5 mg, Take 1 tablet (5 mg total) by mouth 2 (two) times a day, Disp: 60 tablet, Rfl: 6    ascorbic acid (VITAMIN C) 500 mg tablet, Take 500 mg by mouth daily, Disp: , Rfl:     benztropine (COGENTIN) 1 mg tablet, Take 1 tablet (1 mg total) by mouth 2 (two) times a day for 150 days, Disp: 60 tablet, Rfl: 4    divalproex sodium (DEPAKOTE ER) 500 mg 24 hr tablet, Take 1 tablet (500 mg total) by mouth every evening for 150 days, Disp: 30 tablet, Rfl: 4    metoprolol tartrate (LOPRESSOR) 50 mg tablet, Take 1 tablet (50 mg total) by mouth every 12 (twelve) hours, Disp: 60 tablet, Rfl: 6    Multiple Vitamin (MULTI-VITAMIN DAILY) TABS, Take 1 tablet by mouth daily, Disp: , Rfl:     risperiDONE (RisperDAL) 1 mg tablet, Take 1 tablet (1 mg total) by mouth every evening for 150 days, Disp: 30 tablet, Rfl: 4  Allergies   Allergen Reactions    Levaquin [Levofloxacin]      "heavy legs"    Ampicillin Rash     Category: Allergy;     Clindamycin Rash     Category:  Allergy; Labs:  Lab Results   Component Value Date     (L) 01/08/2016    K 4 0 05/01/2019    K 4 3 01/08/2016    CL 99 (L) 05/01/2019     01/08/2016    CO2 31 05/01/2019    CO2 30 01/08/2016    BUN 16 05/01/2019    BUN 16 01/08/2016    CREATININE 0 75 05/01/2019    CREATININE 0 71 01/08/2016    GLUCOSE 89 01/08/2016    CALCIUM 8 5 05/01/2019    CALCIUM 8 9 01/08/2016     Lab Results   Component Value Date    WBC 5 82 05/01/2019    WBC 7 70 01/08/2016    HGB 14 0 05/01/2019    HGB 12 2 01/08/2016    HCT 44 4 05/01/2019    HCT 37 8 01/08/2016    MCV 89 05/01/2019    MCV 87 01/08/2016     05/01/2019     01/08/2016     Lab Results   Component Value Date    TRIG 84 11/08/2018    HDL 42 11/08/2018     Imaging:  ECG obtained today demonstrates atrial fibrillation    STRESS NUCLEAR(3/2019):  SUMMARY:  -  Stress results: Maximal heart rate during stress was 210 bpm ( 133 % of maximal predicted heart rate) with limited exercise and thus she was switched to a chemical stress test  Target heart rate was achieved  There was no chest pain  during stress  -  ECG conclusions: Arrhythmia during stress: atrial fibrillation  The stress ECG was non-diagnostic   -  Perfusion imaging: There were no perfusion defects   -  Gated SPECT: The calculated left ventricular ejection fraction was 78 %  There was no left ventricular regional abnormality      IMPRESSIONS: Normal study after pharmacologic vasodilation  Myocardial perfusion imaging was normal at rest and with stress  ECHO(11/2018):  LEFT VENTRICLE:  Systolic function was normal  Ejection fraction was estimated to be 65 %  There were no regional wall motion abnormalities      LEFT ATRIUM:  The atrium was mildly dilated      RIGHT ATRIUM:  The atrium was mildly dilated      MITRAL VALVE:  There was mild annular calcification  There was trace regurgitation      TRICUSPID VALVE:  There was mild regurgitation    Pulmonary artery systolic pressure was within the normal range  Review of Systems:  Review of Systems   Constitutional: Positive for fatigue  HENT: Negative  Eyes: Negative  Respiratory: Positive for chest tightness  Cardiovascular: Negative  Gastrointestinal: Negative  Musculoskeletal: Negative  Skin: Negative  Allergic/Immunologic: Negative  Neurological: Negative  Hematological: Negative  Psychiatric/Behavioral: The patient is nervous/anxious  All other systems reviewed and are negative  Vitals:    09/06/19 1328   BP: 106/70   Pulse: 78     Physical Exam:  Physical Exam   Constitutional: She is oriented to person, place, and time  She appears well-developed and well-nourished  HENT:   Head: Normocephalic and atraumatic  Eyes: Pupils are equal, round, and reactive to light  EOM are normal  Right eye exhibits no discharge  Left eye exhibits no discharge  No scleral icterus  Neck: Normal range of motion  Neck supple  No JVD present  No thyromegaly present  Cardiovascular: Normal rate, S1 normal, S2 normal, normal heart sounds, intact distal pulses and normal pulses  An irregularly irregular rhythm present  Exam reveals no gallop and no friction rub  No murmur heard  Pulmonary/Chest: Effort normal and breath sounds normal  No respiratory distress  She has no wheezes  She has no rales  Abdominal: Soft  Bowel sounds are normal  She exhibits no distension  There is no tenderness  Musculoskeletal: Normal range of motion  She exhibits no edema, tenderness or deformity  Neurological: She is alert and oriented to person, place, and time  No cranial nerve deficit  Skin: Skin is warm and dry  No rash noted  Psychiatric: She has a normal mood and affect  Judgment and thought content normal    Nursing note and vitals reviewed  Discussion/Summary:    1  PAF - Malina Old went through a DC cardioversion in December which was only transiently successful    Heart rates are well controlled, and she is for the most part asymptomatic  We have discussed on multiple occasions pursuing another rhythm control strategy  I wanted her to get treated for sleep apnea, which she started CPAP a few months ago  She tells me that she is not ideally treated as of yet, so therefore we will allow her to get acclimated to this and then discuss a cardioversion at our next visit  She will remain on the same dose of metoprolol, and we will likely use antiarrhythmic therapy with flecainide once we pursue a rhythm control strategy  She had a normal stress nuclear study since last visit  She will remain on Eliquis for stroke prevention  We will see her back in 3 months  2  Obstructive sleep apnea - Ainsley Hayes was found to have this since last visit  She started CPAP a few months ago, and she still continues to work to get used to this along with the help of sleep medicine  Counseling / Coordination of Care  Total office / unit time spent today 25 minutes  Greater than 50% of total time was spent with the patient and / or family counseling and / or coordination of care
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
## 2019-09-18 ENCOUNTER — DOCUMENTATION (OUTPATIENT)
Dept: PSYCHIATRY | Facility: CLINIC | Age: 64
End: 2019-09-18

## 2019-09-19 ENCOUNTER — OFFICE VISIT (OUTPATIENT)
Dept: PSYCHIATRY | Facility: CLINIC | Age: 64
End: 2019-09-19
Payer: COMMERCIAL

## 2019-09-19 VITALS
DIASTOLIC BLOOD PRESSURE: 78 MMHG | WEIGHT: 208 LBS | HEIGHT: 70 IN | SYSTOLIC BLOOD PRESSURE: 117 MMHG | HEART RATE: 98 BPM | BODY MASS INDEX: 29.78 KG/M2

## 2019-09-19 DIAGNOSIS — Z51.81 THERAPEUTIC DRUG MONITORING: Chronic | ICD-10-CM

## 2019-09-19 DIAGNOSIS — F31.78 BIPOLAR I DISORDER, MOST RECENT EPISODE MIXED, IN FULL REMISSION (HCC): Primary | Chronic | ICD-10-CM

## 2019-09-19 DIAGNOSIS — G25.9 EXTRAPYRAMIDAL REACTION: Chronic | ICD-10-CM

## 2019-09-19 PROCEDURE — 99213 OFFICE O/P EST LOW 20 MIN: CPT | Performed by: PSYCHIATRY & NEUROLOGY

## 2019-09-19 PROCEDURE — 90833 PSYTX W PT W E/M 30 MIN: CPT | Performed by: PSYCHIATRY & NEUROLOGY

## 2019-09-19 RX ORDER — DIVALPROEX SODIUM 500 MG/1
500 TABLET, EXTENDED RELEASE ORAL EVERY EVENING
Qty: 30 TABLET | Refills: 4 | Status: SHIPPED | OUTPATIENT
Start: 2019-09-19 | End: 2020-01-16 | Stop reason: SDUPTHER

## 2019-09-19 RX ORDER — RISPERIDONE 1 MG/1
1 TABLET, FILM COATED ORAL EVERY EVENING
Qty: 30 TABLET | Refills: 4 | Status: SHIPPED | OUTPATIENT
Start: 2019-09-19 | End: 2020-01-16 | Stop reason: SDUPTHER

## 2019-09-19 RX ORDER — BENZTROPINE MESYLATE 1 MG/1
1 TABLET ORAL 2 TIMES DAILY
Qty: 60 TABLET | Refills: 4 | Status: SHIPPED | OUTPATIENT
Start: 2019-09-19 | End: 2020-01-16 | Stop reason: SDUPTHER

## 2019-09-19 NOTE — BH TREATMENT PLAN
TREATMENT PLAN (Medication Management Only)        [facility]    Name/Date of Birth/MRN:  Edson Noyola 59 y o  1955 MRN: [id number]  Date of Treatment Plan: September 19, 2019  Diagnosis/Diagnoses:   1  Bipolar I disorder, most recent episode mixed, in full remission ([facility] )    2  Extrapyramidal reaction    3  Therapeutic drug monitoring      Strengths/Personal Resources for Self-Care: "good help to help my health get better and I use it"  Area/Areas of need (in own words): "tired"  1  Long Term Goal:   "to get rid of sleep apnea, Atrial FIbrillation and loose weight", maintain mood stability  Target Date: 4 months - 1/19/2020  Person/Persons responsible for completion of goal: Elijah Gillespie  2  Short Term Objective (s) - How will we reach this goal?:   A  Provider new recommended medication/dosage changes and/or continue medication(s): continue current medications as prescribed (Depakote ER, Risperdal and Cogentin)  B   "follow doctor's instructions and TRY"  C   N/A  Target Date: 4 months - 1/19/2020  Person/Persons Responsible for Completion of Goal: Elijah Gillespie   Progress Towards Goals: stable  Treatment Modality: medication management every 4 months  Review due 90 to 120 days from date of this plan: 4 months - 1/19/2020  Expected length of service: maintenance  My Physician/PA/NP and I have developed this plan together and I agree to work on the goals and objectives  I understand the treatment goals that were developed for my treatment    Electronic Signatures: on file (unless signed below)    Jewels Pardo MD 09/19/19

## 2019-11-06 ENCOUNTER — APPOINTMENT (OUTPATIENT)
Dept: LAB | Facility: MEDICAL CENTER | Age: 64
End: 2019-11-06
Payer: COMMERCIAL

## 2019-11-06 ENCOUNTER — IMMUNIZATIONS (OUTPATIENT)
Dept: FAMILY MEDICINE CLINIC | Facility: CLINIC | Age: 64
End: 2019-11-06
Payer: COMMERCIAL

## 2019-11-06 DIAGNOSIS — Z51.81 THERAPEUTIC DRUG MONITORING: Chronic | ICD-10-CM

## 2019-11-06 DIAGNOSIS — F31.78 BIPOLAR I DISORDER, MOST RECENT EPISODE MIXED, IN FULL REMISSION (HCC): Chronic | ICD-10-CM

## 2019-11-06 DIAGNOSIS — Z23 ENCOUNTER FOR IMMUNIZATION: Primary | ICD-10-CM

## 2019-11-06 LAB
BASOPHILS # BLD AUTO: 0.07 THOUSANDS/ΜL (ref 0–0.1)
BASOPHILS NFR BLD AUTO: 1 % (ref 0–1)
EOSINOPHIL # BLD AUTO: 0.13 THOUSAND/ΜL (ref 0–0.61)
EOSINOPHIL NFR BLD AUTO: 2 % (ref 0–6)
ERYTHROCYTE [DISTWIDTH] IN BLOOD BY AUTOMATED COUNT: 14.6 % (ref 11.6–15.1)
HCT VFR BLD AUTO: 42 % (ref 34.8–46.1)
HGB BLD-MCNC: 13.3 G/DL (ref 11.5–15.4)
IMM GRANULOCYTES # BLD AUTO: 0.01 THOUSAND/UL (ref 0–0.2)
IMM GRANULOCYTES NFR BLD AUTO: 0 % (ref 0–2)
LYMPHOCYTES # BLD AUTO: 1.65 THOUSANDS/ΜL (ref 0.6–4.47)
LYMPHOCYTES NFR BLD AUTO: 29 % (ref 14–44)
MCH RBC QN AUTO: 28.2 PG (ref 26.8–34.3)
MCHC RBC AUTO-ENTMCNC: 31.7 G/DL (ref 31.4–37.4)
MCV RBC AUTO: 89 FL (ref 82–98)
MONOCYTES # BLD AUTO: 0.59 THOUSAND/ΜL (ref 0.17–1.22)
MONOCYTES NFR BLD AUTO: 11 % (ref 4–12)
NEUTROPHILS # BLD AUTO: 3.19 THOUSANDS/ΜL (ref 1.85–7.62)
NEUTS SEG NFR BLD AUTO: 57 % (ref 43–75)
NRBC BLD AUTO-RTO: 0 /100 WBCS
PLATELET # BLD AUTO: 211 THOUSANDS/UL (ref 149–390)
PMV BLD AUTO: 11.3 FL (ref 8.9–12.7)
RBC # BLD AUTO: 4.72 MILLION/UL (ref 3.81–5.12)
WBC # BLD AUTO: 5.64 THOUSAND/UL (ref 4.31–10.16)

## 2019-11-06 PROCEDURE — 85025 COMPLETE CBC W/AUTO DIFF WBC: CPT

## 2019-11-06 PROCEDURE — 90682 RIV4 VACC RECOMBINANT DNA IM: CPT

## 2019-11-06 PROCEDURE — 83036 HEMOGLOBIN GLYCOSYLATED A1C: CPT

## 2019-11-06 PROCEDURE — 90471 IMMUNIZATION ADMIN: CPT

## 2019-11-06 PROCEDURE — 80053 COMPREHEN METABOLIC PANEL: CPT

## 2019-11-06 PROCEDURE — 36415 COLL VENOUS BLD VENIPUNCTURE: CPT

## 2019-11-06 PROCEDURE — 80061 LIPID PANEL: CPT

## 2019-11-06 PROCEDURE — 80164 ASSAY DIPROPYLACETIC ACD TOT: CPT

## 2019-11-07 ENCOUNTER — TELEPHONE (OUTPATIENT)
Dept: BEHAVIORAL/MENTAL HEALTH CLINIC | Facility: CLINIC | Age: 64
End: 2019-11-07

## 2019-11-07 LAB
ALBUMIN SERPL BCP-MCNC: 3.7 G/DL (ref 3.5–5)
ALP SERPL-CCNC: 57 U/L (ref 46–116)
ALT SERPL W P-5'-P-CCNC: 27 U/L (ref 12–78)
ANION GAP SERPL CALCULATED.3IONS-SCNC: 6 MMOL/L (ref 4–13)
AST SERPL W P-5'-P-CCNC: 21 U/L (ref 5–45)
BILIRUB SERPL-MCNC: 0.83 MG/DL (ref 0.2–1)
BUN SERPL-MCNC: 15 MG/DL (ref 5–25)
CALCIUM SERPL-MCNC: 8.8 MG/DL (ref 8.3–10.1)
CHLORIDE SERPL-SCNC: 102 MMOL/L (ref 100–108)
CHOLEST SERPL-MCNC: 165 MG/DL (ref 50–200)
CO2 SERPL-SCNC: 28 MMOL/L (ref 21–32)
CREAT SERPL-MCNC: 0.67 MG/DL (ref 0.6–1.3)
EST. AVERAGE GLUCOSE BLD GHB EST-MCNC: 128 MG/DL
GFR SERPL CREATININE-BSD FRML MDRD: 95 ML/MIN/1.73SQ M
GLUCOSE P FAST SERPL-MCNC: 74 MG/DL (ref 65–99)
HBA1C MFR BLD: 6.1 % (ref 4.2–6.3)
HDLC SERPL-MCNC: 43 MG/DL
LDLC SERPL CALC-MCNC: 102 MG/DL (ref 0–100)
NONHDLC SERPL-MCNC: 122 MG/DL
POTASSIUM SERPL-SCNC: 4.2 MMOL/L (ref 3.5–5.3)
PROT SERPL-MCNC: 7.3 G/DL (ref 6.4–8.2)
SODIUM SERPL-SCNC: 136 MMOL/L (ref 136–145)
TRIGL SERPL-MCNC: 99 MG/DL
VALPROATE SERPL-MCNC: 55 UG/ML (ref 50–100)

## 2019-11-07 NOTE — TELEPHONE ENCOUNTER
Spoke with Juliet Donahue - lab results reviewed   Depakote level is therapeutic, CBC and CMP are normal  Minimally elevated LDL

## 2019-11-12 ENCOUNTER — TELEPHONE (OUTPATIENT)
Dept: FAMILY MEDICINE CLINIC | Facility: CLINIC | Age: 64
End: 2019-11-12

## 2019-11-14 ENCOUNTER — OFFICE VISIT (OUTPATIENT)
Dept: CARDIOLOGY CLINIC | Facility: CLINIC | Age: 64
End: 2019-11-14
Payer: COMMERCIAL

## 2019-11-14 VITALS
HEART RATE: 80 BPM | DIASTOLIC BLOOD PRESSURE: 80 MMHG | SYSTOLIC BLOOD PRESSURE: 120 MMHG | WEIGHT: 214 LBS | OXYGEN SATURATION: 99 % | BODY MASS INDEX: 30.64 KG/M2 | HEIGHT: 70 IN

## 2019-11-14 DIAGNOSIS — G47.33 OSA (OBSTRUCTIVE SLEEP APNEA): ICD-10-CM

## 2019-11-14 DIAGNOSIS — I48.19 PERSISTENT ATRIAL FIBRILLATION (HCC): Primary | ICD-10-CM

## 2019-11-14 PROCEDURE — 99214 OFFICE O/P EST MOD 30 MIN: CPT | Performed by: INTERNAL MEDICINE

## 2019-11-14 PROCEDURE — 93000 ELECTROCARDIOGRAM COMPLETE: CPT | Performed by: INTERNAL MEDICINE

## 2019-11-14 NOTE — PATIENT INSTRUCTIONS

## 2019-11-14 NOTE — PROGRESS NOTES
Cardiology Follow Up    Julia Hernandez  1955  [id number]  [address] [facility]  [address] [address]  [phone]    1  Persistent atrial fibrillation  POCT ECG    CARDIOVERSION (NON INVASIVE ONLY)   2  DOMENIC (obstructive sleep apnea)         Interval History:     Mrs Carla Damian comes in for follow-up given her history of atrial fibrillation  I saw her in consultation last year while she was in the hospital   While following up with her PCP for hyponatremia, atrial fibrillation was detected and she was sent over to the hospital   During her hospitalization metoprolol was added, and we up titrated this and her heart rates were under good control  She was started on and has tolerated Eliquis for stroke prevention  After our 1st follow-up, I set her up for a cardioversion  This was only transiently successful, as she arrived back in atrial fibrillation in follow-up  Her heart rates were well controlled, and her symptoms were improved  She also had showed some features of obstructive sleep apnea, so we continued rate control strategy as we worked up sleep apnea  Sleep study did show mild obstructive sleep apnea, and after seen sleep medicine she started CPAP and has tolerated this ever since  At our last visit she was just on at a month or so, and I wanted her to be treated longer before we discussed a rhythm control  Ceci De Paz still continues to get intermittent chest tightness and shortness of breath, but is improved since her hospitalization  Some of her chest tightness appears to be whether related, and may likely be more pulmonary  She does not appear to have any palpitations at this point  No signs or symptoms of CHF  She denies lightheadedness or any a recent syncope        Patient Active Problem List   Diagnosis    Bipolar I disorder, most recent episode mixed, in full remission ([facility] )    Extrapyramidal reaction    Therapeutic drug monitoring    Screening for breast cancer    Screen for colon cancer    Other persistent atrial fibrillation    Gross hematuria    DOMENIC (obstructive sleep apnea)     Past Medical History:   Diagnosis Date    Atrial fibrillation ([address] )     Psychiatric disorder     bipolar    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    Sleep apnea     Varicose veins of legs      Social History     Socioeconomic History    Marital status: /Civil Union     Spouse name: Not on file    Number of children: 1    Years of education: 15    Highest education level: 12th grade   Occupational History    Occupation: unemployed   Social Needs    Financial resource strain: Not hard at all   [address] insecurity:     Worry: Never true     Inability: Never true    Transportation needs:     Medical: No     Non-medical: No   Tobacco Use    Smoking status: Former Smoker    Smokeless tobacco: Never Used   Substance and Sexual Activity    Alcohol use: No     Frequency: Never     Binge frequency: Never    Drug use: No     Comment: History of cannabis use years ago  No current recreational drug use    Sexual activity: Yes     Partners: Male   Lifestyle    Physical activity:     Days per week: 7 days     Minutes per session: 90 min    Stress:  Only a little   Relationships    Social connections:     Talks on phone: Twice a week     Gets together: Twice a week     Attends Mandaeism service: Never     Active member of club or organization: No     Attends meetings of clubs or organizations: Never     Relationship status:     Intimate partner violence:     Fear of current or ex partner: No     Emotionally abused: No     Physically abused: No     Forced sexual activity: No   Other Topics Concern    Not on file   Social History Narrative    Education: high school graduate    Learning Disabilities: none    Marital History:     Children: 1 adult son    Living Arrangement: lives in home with     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, unemployed    Functioning Relationships: good support system,  is supportive    Legal History: none     History: None    Always uses seat belt       Family History   Problem Relation Age of Onset    Psychiatric Illness Maternal [de-identified]     Breast cancer Maternal Aunt     Stroke Mother         CVA    Heart failure Mother     Diabetes Mother     Hypertension Mother     Thyroid disease Mother     Gallbladder disease Mother         gallstones    Diabetes Brother     Multiple sclerosis Brother     Diabetes Maternal Grandmother     Breast cancer Cousin      Past Surgical History:   Procedure Laterality Date    CARDIOVERSION       SECTION         Current Outpatient Medications:     acetaminophen (TYLENOL) 325 mg tablet, Take 650 mg by mouth every 6 (six) hours as needed for mild pain, Disp: , Rfl:     apixaban (ELIQUIS) 5 mg, Take 1 tablet (5 mg total) by mouth 2 (two) times a day, Disp: 60 tablet, Rfl: 6    ascorbic acid (VITAMIN C) 500 mg tablet, Take 500 mg by mouth daily, Disp: , Rfl:     benztropine (COGENTIN) 1 mg tablet, Take 1 tablet (1 mg total) by mouth 2 (two) times a day, Disp: 60 tablet, Rfl: 4    divalproex sodium (DEPAKOTE ER) 500 mg 24 hr tablet, Take 1 tablet (500 mg total) by mouth every evening, Disp: 30 tablet, Rfl: 4    metoprolol tartrate (LOPRESSOR) 50 mg tablet, Take 1 tablet (50 mg total) by mouth every 12 (twelve) hours, Disp: 60 tablet, Rfl: 6    Multiple Vitamin (MULTI-VITAMIN DAILY) TABS, Take 1 tablet by mouth daily, Disp: , Rfl:     risperiDONE (RisperDAL) 1 mg tablet, Take 1 tablet (1 mg total) by mouth every evening, Disp: 30 tablet, Rfl: 4  Allergies   Allergen Reactions    Levaquin [Levofloxacin]      "heavy legs"    Ampicillin Rash     Category: Allergy;     Clindamycin Rash     Category:  Allergy;        Labs:  Lab Results   Component Value Date     (L) 2016    K 4 2 11/06/2019    K 4 3 01/08/2016     11/06/2019     01/08/2016    CO2 28 11/06/2019    CO2 30 01/08/2016    BUN 15 11/06/2019    BUN 16 01/08/2016    CREATININE 0 67 11/06/2019    CREATININE 0 71 01/08/2016    GLUCOSE 89 01/08/2016    CALCIUM 8 8 11/06/2019    CALCIUM 8 9 01/08/2016     Lab Results   Component Value Date    WBC 5 64 11/06/2019    WBC 7 70 01/08/2016    HGB 13 3 11/06/2019    HGB 12 2 01/08/2016    HCT 42 0 11/06/2019    HCT 37 8 01/08/2016    MCV 89 11/06/2019    MCV 87 01/08/2016     11/06/2019     01/08/2016     Lab Results   Component Value Date    TRIG 99 11/06/2019    HDL 43 11/06/2019     Imaging:  ECG obtained today demonstrates atrial fibrillation    STRESS NUCLEAR(3/2019):  SUMMARY:  -  Stress results: Maximal heart rate during stress was 210 bpm ( 133 % of maximal predicted heart rate) with limited exercise and thus she was switched to a chemical stress test  Target heart rate was achieved  There was no chest pain  during stress  -  ECG conclusions: Arrhythmia during stress: atrial fibrillation  The stress ECG was non-diagnostic   -  Perfusion imaging: There were no perfusion defects   -  Gated SPECT: The calculated left ventricular ejection fraction was 78 %  There was no left ventricular regional abnormality      IMPRESSIONS: Normal study after pharmacologic vasodilation  Myocardial perfusion imaging was normal at rest and with stress  ECHO(11/2018):  LEFT VENTRICLE:  Systolic function was normal  Ejection fraction was estimated to be 65 %  There were no regional wall motion abnormalities      LEFT ATRIUM:  The atrium was mildly dilated      RIGHT ATRIUM:  The atrium was mildly dilated      MITRAL VALVE:  There was mild annular calcification  There was trace regurgitation      TRICUSPID VALVE:  There was mild regurgitation  Pulmonary artery systolic pressure was within the normal range        Review of Systems:  Review of Systems   Constitutional: Positive for fatigue  HENT: Negative  Eyes: Negative  Respiratory: Positive for chest tightness  Cardiovascular: Negative  Gastrointestinal: Negative  Musculoskeletal: Negative  Skin: Negative  Allergic/Immunologic: Negative  Neurological: Negative  Hematological: Negative  Psychiatric/Behavioral: The patient is nervous/anxious  All other systems reviewed and are negative  Vitals:    11/14/19 1312   BP: 120/80   Pulse: 80   SpO2: 99%     Physical Exam:  Physical Exam   Constitutional: She is oriented to person, place, and time  She appears well-developed and well-nourished  HENT:   Head: Normocephalic and atraumatic  Eyes: Pupils are equal, round, and reactive to light  EOM are normal  Right eye exhibits no discharge  Left eye exhibits no discharge  No scleral icterus  Neck: Normal range of motion  Neck supple  No JVD present  No thyromegaly present  Cardiovascular: Normal rate, S1 normal, S2 normal, normal heart sounds, intact distal pulses and normal pulses  An irregularly irregular rhythm present  Exam reveals no gallop and no friction rub  No murmur heard  Pulmonary/Chest: Effort normal and breath sounds normal  No respiratory distress  She has no wheezes  She has no rales  Abdominal: Soft  Bowel sounds are normal  She exhibits no distension  There is no tenderness  Musculoskeletal: Normal range of motion  She exhibits no edema, tenderness or deformity  Neurological: She is alert and oriented to person, place, and time  No cranial nerve deficit  Skin: Skin is warm and dry  No rash noted  Psychiatric: She has a normal mood and affect  Judgment and thought content normal    Nursing note and vitals reviewed  Discussion/Summary:    1  JASKARAN Potts went through a DC cardioversion about a year ago which was only transiently successful  Heart rates are well controlled, and she is for the most part asymptomatic    We have discussed on multiple occasions pursuing another rhythm control strategy, but I wanted her to get treated adequately for obstructive sleep apnea  We appear to be at this point, I have recommended a cardioversion in the near future  She wanted to think about this, but likely will schedule this soon  She remains on Eliquis and has not missed any doses  She will remain on the same dose of Toprol  We will otherwise see her back in 3 months  2  Obstructive sleep apnea - Isaura Mazariegos was found to have this since last visit  She started CPAP a few months ago, and she is tolerating this well  She continues to follow with sleep medicine  Counseling / Coordination of Care  Total office / unit time spent today 25 minutes  Greater than 50% of total time was spent with the patient and / or family counseling and / or coordination of care

## 2019-11-20 ENCOUNTER — TELEPHONE (OUTPATIENT)
Dept: CARDIOLOGY CLINIC | Facility: CLINIC | Age: 64
End: 2019-11-20

## 2019-11-20 NOTE — TELEPHONE ENCOUNTER
Pt called today regarding cardioversion- she would like to check with her insurance prior to scheduling  I have provided her with the codes needed and she states she will call the office back to schedule

## 2019-11-22 NOTE — TELEPHONE ENCOUNTER
Pt is scheduled at [facility] on 12/12/19 for a Cardioversion with Dr Selene Covarrubias  Pt aware of instructions  Can I have preauth please

## 2019-11-26 NOTE — TELEPHONE ENCOUNTER
She does not need auth for her Cardioversion [address]21 on 12/12/19 at [facility] at [facility]  Ref# [id number]

## 2019-12-12 ENCOUNTER — TELEPHONE (OUTPATIENT)
Dept: NON INVASIVE DIAGNOSTICS | Facility: HOSPITAL | Age: 64
End: 2019-12-12

## 2019-12-13 RX ORDER — SODIUM CHLORIDE 9 MG/ML
50 INJECTION, SOLUTION INTRAVENOUS CONTINUOUS
Status: CANCELLED | OUTPATIENT
Start: 2019-12-13

## 2019-12-15 ENCOUNTER — TELEPHONE (OUTPATIENT)
Dept: INPATIENT UNIT | Facility: HOSPITAL | Age: 64
End: 2019-12-15

## 2019-12-16 ENCOUNTER — HOSPITAL ENCOUNTER (OUTPATIENT)
Dept: NON INVASIVE DIAGNOSTICS | Facility: HOSPITAL | Age: 64
Discharge: HOME | End: 2019-12-16
Payer: COMMERCIAL

## 2019-12-16 ENCOUNTER — ANESTHESIA (OUTPATIENT)
Dept: NON INVASIVE DIAGNOSTICS | Facility: HOSPITAL | Age: 64
End: 2019-12-16

## 2019-12-16 ENCOUNTER — ANESTHESIA EVENT (OUTPATIENT)
Dept: NON INVASIVE DIAGNOSTICS | Facility: HOSPITAL | Age: 64
End: 2019-12-16

## 2019-12-16 ENCOUNTER — TELEPHONE (OUTPATIENT)
Dept: NON INVASIVE DIAGNOSTICS | Facility: HOSPITAL | Age: 64
End: 2019-12-16

## 2019-12-16 VITALS
HEART RATE: 90 BPM | DIASTOLIC BLOOD PRESSURE: 80 MMHG | RESPIRATION RATE: 18 BRPM | SYSTOLIC BLOOD PRESSURE: 121 MMHG | OXYGEN SATURATION: 96 %

## 2019-12-16 DIAGNOSIS — I48.19 PERSISTENT ATRIAL FIBRILLATION (HCC): ICD-10-CM

## 2019-12-16 PROCEDURE — 92960 CARDIOVERSION ELECTRIC EXT: CPT | Performed by: INTERNAL MEDICINE

## 2019-12-16 PROCEDURE — 92960 CARDIOVERSION ELECTRIC EXT: CPT

## 2019-12-16 PROCEDURE — 93005 ELECTROCARDIOGRAM TRACING: CPT

## 2019-12-16 RX ORDER — PROPOFOL 10 MG/ML
INJECTION, EMULSION INTRAVENOUS AS NEEDED
Status: DISCONTINUED | OUTPATIENT
Start: 2019-12-16 | End: 2019-12-16 | Stop reason: SURG

## 2019-12-16 RX ORDER — SODIUM CHLORIDE 9 MG/ML
INJECTION, SOLUTION INTRAVENOUS CONTINUOUS PRN
Status: DISCONTINUED | OUTPATIENT
Start: 2019-12-16 | End: 2019-12-16 | Stop reason: SURG

## 2019-12-16 RX ADMIN — SODIUM CHLORIDE: 9 INJECTION, SOLUTION INTRAVENOUS at 12:44

## 2019-12-16 RX ADMIN — PROPOFOL 150 MG: 10 INJECTION, EMULSION INTRAVENOUS at 13:16

## 2019-12-16 RX ADMIN — PROPOFOL 50 MG: 10 INJECTION, EMULSION INTRAVENOUS at 13:20

## 2019-12-16 NOTE — DISCHARGE INSTRUCTIONS
Cardioversion   WHAT YOU SHOULD KNOW:   Cardioversion is a procedure to correct arrhythmias, which is when your heart beats too fast or irregularly  Arrhythmias may prevent your body from getting the blood and oxygen it needs  Cardioversion delivers a shock of electricity to your heart to help it return to its normal rhythm  AFTER YOU LEAVE:   Medicines:   · Anticoagulants    are a type of blood thinner medicine that helps prevent clots  Clots can cause strokes, heart attacks, and death  These medicines may cause you to bleed or bruise more easily  ¨ Watch for bleeding from your gums or nose  Watch for blood in your urine and bowel movements  Use a soft washcloth and a soft toothbrush  If you shave, use an electric razor  Avoid activities that can cause bruising or bleeding  ¨ Tell your healthcare provider about all medicines you take because many medicines cannot be used with anticoagulants  Do not start or stop any medicines unless your healthcare provider tells you to  Tell your dentist and other healthcare providers that you take anticoagulants  Wear a bracelet or necklace that says you take this medicine  ¨ You will need regular blood tests so your healthcare provider can decide how much medicine you need  Take anticoagulants exactly as directed  Tell your healthcare provider right away if you forget to take the medicine, or if you take too much  ¨ If you take warfarin, some foods can change how your blood clots  Do not make major changes to your diet while you take warfarin  Warfarin works best when you eat about the same amount of vitamin K every day  Vitamin K is found in green leafy vegetables, broccoli, grapes, and other foods  Ask for more information about what to eat when you take warfarin  · Heart medicine: This medicine helps strengthen or regulate your heartbeat  · Take your medicine as directed    Call your healthcare provider if you think your medicine is not helping or if you have side effects  Tell him if you are allergic to any medicine  Keep a list of the medicines, vitamins, and herbs you take  Include the amounts, and when and why you take them  Bring the list or the pill bottles to follow-up visits  Carry your medicine list with you in case of an emergency  Follow up with your cardiologist as directed:  Write down your questions so you remember to ask them during your visits  Contact your cardiologist if:   · You have a fever  · You have new or worsening weakness or tiredness  · You have questions or concerns about your condition or care  Seek care immediately or call 911 if:   · You feel like your heart is fluttering or jumping in your chest     · The skin around the area where the internal catheter was placed is warm, red, swollen, or has pus coming from it  · You feel lightheaded or you have fainted  · You have chest pain when you take a deep breath or cough  You may cough up blood  · You have discomfort in your chest that feels like squeezing, pressure, fullness, or pain  · You have pain or discomfort in your back, neck, jaw, stomach, or arm  · You have weakness or numbness in part of your body  · You have sudden trouble breathing  · You become confused or have difficulty speaking  · You have dizziness, a severe headache, or vision loss  © 2014 [employer] is for End User's use only and may not be sold, redistributed or otherwise used for commercial purposes  All illustrations and images included in CareNotes® are the copyrighted property of A [employer] M [employer] Inc  or Guillermo Javier  The above information is an  only  It is not intended as medical advice for individual conditions or treatments  Talk to your doctor, nurse or pharmacist before following any medical regimen to see if it is safe and effective for you        Moderate Sedation   WHAT YOU NEED TO KNOW:   Moderate sedation, or conscious sedation, is medicine used during procedures to help you feel relaxed and calm  You will be awake and able to follow directions without anxiety or pain  You will remember little to none of the procedure  You may feel tired, weak, or unsteady on your feet after you get sedation  You may also have trouble concentrating or short-term memory loss  These symptoms should go away in 24 hours or less  DISCHARGE INSTRUCTIONS:   Call 911 or have someone else call for any of the following:   · You have sudden trouble breathing  · You cannot be woken  Seek care immediately if:   · You have a severe headache or dizziness  · Your heart is beating faster than usual   Contact your healthcare provider if:   · You have a fever  · You have nausea or are vomiting for more than 8 hours after the procedure  · Your skin is itchy, swollen, or you have a rash  · You have questions or concerns about your condition or care  Self-care:   · Have someone stay with you for 24 hours  This person can drive you to errands and help you do things around the house  This person can also watch for problems  · Rest and do quiet activities for 24 hours  Do not exercise, ride a bike, or play sports  Stand up slowly to prevent dizziness and falls  Take short walks around the house with another person  Slowly return to your usual activities the next day  · Do not drive or use dangerous machines or tools for 24 hours  You may injure yourself or others  Examples include a lawnmower, saw, or drill  Do not return to work for 24 hours if you use dangerous machines or tools for work  · Do not make important decisions for 24 hours  For example, do not sign important papers or invest money  · Drink liquids as directed  Liquids help flush the sedation medicine out of your body  Ask how much liquid to drink each day and which liquids are best for you       · Eat small, frequent meals to prevent nausea and vomiting  Start with clear liquids such as juice or broth  If you do not vomit after clear liquids, you can eat your usual foods  · Do not drink alcohol or take medicines that make you drowsy  This includes medicines that help you sleep and anxiety medicines  Ask your healthcare provider if it is safe for you to take pain medicine  Follow up with your healthcare provider as directed:  Write down your questions so you remember to ask them during your visits  © 2017 [address] [employer] Information is for End User's use only and may not be sold, redistributed or otherwise used for commercial purposes  All illustrations and images included in CareNotes® are the copyrighted property of A [employer] M [employer]  or Guillermo Javier  The above information is an  only  It is not intended as medical advice for individual conditions or treatments  Talk to your doctor, nurse or pharmacist before following any medical regimen to see if it is safe and effective for you

## 2019-12-17 NOTE — ANESTHESIA PREPROCEDURE EVALUATION
Review of Systems/Medical History  Patient summary reviewed  Chart reviewed      Cardiovascular  EKG reviewed, Exercise tolerance (METS): >4,  Dysrhythmias , atrial fibrillation,    Pulmonary  Sleep apnea ,        GI/Hepatic            Endo/Other     GYN  Negative gynecology ROS          Hematology   Musculoskeletal       Neurology   Psychology       Comment: bipolar         Physical Exam    Airway    Mallampati score: II  TM Distance: >3 FB  Neck ROM: full     Dental       Cardiovascular  Rhythm: irregular,     Pulmonary  Breath sounds clear to auscultation,     Other Findings        Anesthesia Plan  ASA Score- 3     Anesthesia Type- IV sedation with anesthesia with ASA Monitors  Additional Monitors:   Airway Plan:         Plan Factors-    Induction- intravenous  Postoperative Plan-     Informed Consent- Anesthetic plan and risks discussed with patient and spouse  I personally reviewed this patient with the CRNA  Discussed and agreed on the Anesthesia Plan with the CRNA  Katiana Carlin

## 2019-12-19 LAB
ATRIAL RATE: 102 BPM
QRS AXIS: 23 DEGREES
QRSD INTERVAL: 84 MS
QT INTERVAL: 366 MS
QTC INTERVAL: 447 MS
T WAVE AXIS: 24 DEGREES
VENTRICULAR RATE: 90 BPM

## 2019-12-19 PROCEDURE — 93010 ELECTROCARDIOGRAM REPORT: CPT | Performed by: INTERNAL MEDICINE

## 2019-12-31 ENCOUNTER — CONSULT (OUTPATIENT)
Dept: CARDIOLOGY CLINIC | Facility: CLINIC | Age: 64
End: 2019-12-31
Payer: COMMERCIAL

## 2019-12-31 VITALS
BODY MASS INDEX: 30.95 KG/M2 | HEART RATE: 88 BPM | DIASTOLIC BLOOD PRESSURE: 82 MMHG | SYSTOLIC BLOOD PRESSURE: 110 MMHG | WEIGHT: 216.2 LBS | HEIGHT: 70 IN

## 2019-12-31 DIAGNOSIS — G47.33 OSA (OBSTRUCTIVE SLEEP APNEA): ICD-10-CM

## 2019-12-31 DIAGNOSIS — I48.19 OTHER PERSISTENT ATRIAL FIBRILLATION (HCC): Primary | ICD-10-CM

## 2019-12-31 PROCEDURE — 99245 OFF/OP CONSLTJ NEW/EST HI 55: CPT | Performed by: INTERNAL MEDICINE

## 2019-12-31 NOTE — PROGRESS NOTES
HEART AND VASCULAR  CARDIAC [address] [facility]    Outpatient New Consult    Today's Date: 12/31/19        Patient name: Smitha Villafuerte  YOB: 1955  Sex: female         Chief Complaint: Referred from Dr Javier Waterman for persistent afib      ASSESSMENT:  Problem List Items Addressed This Visit     None        63 yo female  1) Persistent afib- Dx Nov 2018 found incidentaly by PCP then, tried DCCV Jan 2019 and then again 2 weeks ago, unclear how long she stayed in NSR after each DCCV but is in afib today and was at 1 month f/u w Dr Javier Waterman  Her last DCCV took 4 shocks to restore NSR, 300J  Her echo report says LA is only mild dilation (18 29 cm squared, it says 2 8cm diameter but likely underestimated) my review LA is normal     She doesn't feel palpiations but notes SOB, some edema, fatigue, decreased exercise tolerance and chest discomfort daily since dx w afib  2) VJLIB1Vtpm=7 (female) so technically could not use Ac, albeit she has had recent DCCV and [place]cho Blanco may underestimate risk of persistent afib  She has had hematuria    3) Bipolar on Risperadone and divalproex  QTc 428ms today  4) DOMENIC on CPAP, dx after afib  She reports compliance    PLAN:  1  Options reviewed with her  I do not recommend QT prolonging medication or other antiarrhythmics other than amiodarone given that she is on psychotropic drugs taht also prolong QT  We could do short course of amiodarone w less risk of sudden death for 6 months or so, but she is too young for lifelong amio  Therefore I recommend ablation first line for her  We could do posterior wall isolation to increase efficacy in long standing persistent afib        INFORMED CONSENT: Risks, benefits, and alternatives to atrial fibrillation ablation with possibly a combination of cryoballoon and radiofrequency ablation, and associated procedures such as transesophageal echocardiogram and atrial flutter ablation discussed  Alternative treatment with medical management has been tried and/or discussed  Usual pre and post operative expectations were discussed  Estimated complication rate is <0%  Atrial fibrillation ablation was explained to be a treatment that reduces burden of atrial fibrillation but is not a cure and medications and repeat ablations are often required  Although most patients derive improvement in symptoms and burden in atrial fibrillation, there are some patients that have not improved following this procedure  Blood thinners will not be discontinued immediately after ablation and may be required long term regardless of result  The patient understood risks include but not limited to death, esophageal injury, phrenic nerve injury (diaphragm), stroke, bleeding and vascular complication, bleeding around the heart and open heart surgery  AFter detailed discussion she is undecided and wants to think about it and will call back and schedule if she wishes to proceed  No orders of the defined types were placed in this encounter  There are no discontinued medications    HPI/Subjective:    61 yo female  1) Persistent afib- Dx Nov 2018 found incidentaly by PCP then, tried DCCV Jan 2019 and then again 2 weeks ago, unclear how long she stayed in NSR after each DCCV but is in afib today and was at 1 month f/u w Dr Matthew Araya  Her last DCCV took 4 shocks to restore NSR, 300J  Her LA is only mild dilation (18 29 cm squared, it says 2 8cm diameter but likely underestimated)  She doesn't feel palpiatinos but notes SOB, some edema, fatigue, decreased exercise tolerance since dx w afib  2) PHPIL1Hbfs=6 (female) so technically could not use Ac, albeit she has had recent DCCV and [place] may underestimate risk of persistent afib  She has had hematuria    3) Bipolar on Risperadone and divalproex   QTc 428ms today    She comes today after Dr Yoselyn Baxter did DCCV a few weeks ago and back in afib  She feels no different after DCCV  She reports daily chest pressure, fatigue, exertional SOB  No palpitations  Please note HPI is listed by problem with with update following it, it is copied again in the assessment above and reflects medical decision making as well  Complete 12 point ROS reviewed and otherwise non pertinent or negative except as per HPI pertinent positives in Cardiovascular and Respiratory emphasized  Please see paper chart for outpatient clinic patients where the patient completed the 12 point ROS survey  Past Medical History:   Diagnosis Date    Atrial fibrillation ([facility] [address] )     Psychiatric disorder     bipolar    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    Sleep apnea     Varicose veins of legs        Allergies   Allergen Reactions    Levaquin [Levofloxacin]      "heavy legs"    Ampicillin Rash     Category: Allergy;     Clindamycin Rash     Category: Allergy;      I reviewed the Home Medication list and Allergies in the chart     Scheduled Meds:  Current Outpatient Medications   Medication Sig Dispense Refill    acetaminophen (TYLENOL) 325 mg tablet Take 650 mg by mouth every 6 (six) hours as needed for mild pain      apixaban (ELIQUIS) 5 mg Take 1 tablet (5 mg total) by mouth 2 (two) times a day 60 tablet 6    ascorbic acid (VITAMIN C) 500 mg tablet Take 500 mg by mouth daily      benztropine (COGENTIN) 1 mg tablet Take 1 tablet (1 mg total) by mouth 2 (two) times a day 60 tablet 4    divalproex sodium (DEPAKOTE ER) 500 mg 24 hr tablet Take 1 tablet (500 mg total) by mouth every evening 30 tablet 4    metoprolol tartrate (LOPRESSOR) 50 mg tablet Take 1 tablet (50 mg total) by mouth every 12 (twelve) hours 60 tablet 6    Multiple Vitamin (MULTI-VITAMIN DAILY) TABS Take 1 tablet by mouth daily      risperiDONE (RisperDAL) 1 mg tablet Take 1 tablet (1 mg total) by mouth every evening 30 tablet 4     No current facility-administered medications for this visit  PRN Meds:         Family History   Problem Relation Age of Onset   [facility] Psychiatric Illness Maternal Aunt     Breast cancer Maternal Aunt     Stroke Mother         CVA    Heart failure Mother     Diabetes Mother     Hypertension Mother     Thyroid disease Mother     Gallbladder disease Mother         gallstones    Diabetes Brother     Multiple sclerosis Brother     Diabetes Maternal Grandmother     Breast cancer Cousin        Social History     Socioeconomic History    Marital status: /Civil Union     Spouse name: Not on file    Number of children: 1    Years of education: 15    Highest education level: 12th grade   Occupational History    Occupation: unemployed   Social Needs    Financial resource strain: Not hard at all   [place]raw[place]-Jigar insecurity:     Worry: Never true     Inability: Never true    Transportation needs:     Medical: No     Non-medical: No   Tobacco Use    Smoking status: Former Smoker    Smokeless tobacco: Never Used   Substance and Sexual Activity    Alcohol use: No     Frequency: Never     Binge frequency: Never    Drug use: No    Sexual activity: Yes     Partners: Male   Lifestyle    Physical activity:     Days per week: 7 days     Minutes per session: 90 min    Stress:  Only a little   Relationships    Social connections:     Talks on phone: Twice a week     Gets together: Twice a week     Attends Restoration service: Never     Active member of club or organization: No     Attends meetings of clubs or organizations: Never     Relationship status:     Intimate partner violence:     Fear of current or ex partner: No     Emotionally abused: No     Physically abused: No     Forced sexual activity: No   Other Topics Concern    Not on file   Social History Narrative    Education: high school graduate    Learning Disabilities: none    Marital History:     Children: 1 adult son Living Arrangement: lives in home with     Occupational History: worked in [employer] in the past, unemployed    Functioning Relationships: good support system,  is supportive    Legal History: none     History: None    Always uses seat belt        OBJECTIVE:    /82 (BP Location: Left arm, Patient Position: Sitting, Cuff Size: Large)   Pulse 88   Ht 5' 10" (1 778 m)   Wt 98 1 kg (216 lb 3 2 oz)   BMI 31 02 kg/m²   Vitals:    12/31/19 1434   Weight: 98 1 kg (216 lb 3 2 oz)     GEN: No acute distress, Alert and oriented, well appearing  HEENT:Head, neck, ears, oral pharynx: Mucus membranes moist, oral pharynx clear, nares clear  External ears normal  EYES: Pupils equal, sclera anicteric, midline, normal conjuctiva  NECK: No JVD, supple, no obvious masses or thryomegaly or goiter  CARDIOVASCULAR: irreg irreg, No murmur, rub, gallops S1,S2  LUNGS: Clear To auscultation bilaterally, normal effort, no rales, rhonchi, crackles  ABDOMEN:  nondistended,  without obvious organomegaly or ascites  EXTREMITIES/VASCULAR:  No edema  Radial pulses intact, pedal pulses difficult to palpate, warm an well perfused  PSYCH: Normal Affect, no overt suicidal ideation, linear speech pattern without evidence of psychosis  NEURO: Grossly intact, moving all extremiteis equal, face symmetric, alert and responsive, no obvious focal defecits  GAIT:  Ambulates normally without difficulty  HEME: No bleeding, bruising, petechia, purpura  SKIN: No significant rashes, warm, no diaphoresis or pallor       Lab Results:       LABS:      Chemistry        Component Value Date/Time     (L) 01/08/2016 1424    K 4 2 11/06/2019 1433    K 4 3 01/08/2016 1424     11/06/2019 1433     01/08/2016 1424    CO2 28 11/06/2019 1433    CO2 30 01/08/2016 1424    BUN 15 11/06/2019 1433    BUN 16 01/08/2016 1424    CREATININE 0 67 11/06/2019 1433    CREATININE 0 71 01/08/2016 1424        Component Value Date/Time CALCIUM 8 8 2019 1433    CALCIUM 8 9 2016 1424    ALKPHOS 57 2019 1433    ALKPHOS 59 2016 1424    AST 21 2019 1433    AST 15 2016 1424    ALT 27 2019 1433    ALT 19 2016 1424    BILITOT 0 56 2016 1424            No results found for: CHOL  Lab Results   Component Value Date    HDL 43 2019    HDL 42 2018    HDL 46 2017     Lab Results   Component Value Date    LDLCALC 102 (H) 2019    LDLCALC 97 2018    LDLCALC 112 (H) 2017     Lab Results   Component Value Date    TRIG 99 2019    TRIG 84 2018    TRIG 70 2017     No results found for: CHOLHDL    IMAGING: No results found  Cardiac testing:   Results for orders placed during the hospital encounter of 11/15/18   Echo complete with contrast if indicated    Narrative [place] [id number], [address] [place] [address]  [phone]    Transthoracic Echocardiogram  2D, M-mode, Doppler, and Color Doppler    Study date:  2018    Patient: Olivia Loving  MR number: [id number]  Account number: [de-identified]  : 1955  Age: 65 years  Gender: Female  Status: Inpatient  Location: Bedside  Height: 70 in  Weight: 194 7 lb  BP: 105/ 65 mmHg    Indications: Atrial fibrillation  Diagnoses: I48 0 - Atrial fibrillation    Sonographer:  Rika Hong RDCS  Primary Physician:  Parth Jackson MD  Referring Physician:  CINDY Puentes  Group:  [facility]  Interpreting Physician:  Jhoan Browne MD    SUMMARY    LEFT VENTRICLE:  Systolic function was normal  Ejection fraction was estimated to be 65 %  There were no regional wall motion abnormalities  LEFT ATRIUM:  The atrium was mildly dilated  RIGHT ATRIUM:  The atrium was mildly dilated  MITRAL VALVE:  There was mild annular calcification  There was trace regurgitation  TRICUSPID VALVE:  There was mild regurgitation    Pulmonary artery systolic pressure was within the normal range  HISTORY: PRIOR HISTORY: AFIB    PROCEDURE: The procedure was performed at the bedside  This was a routine study  The transthoracic approach was used  The study included complete 2D imaging, M-mode, complete spectral Doppler, and color Doppler  The heart rate was 50 bpm,  at the start of the study  Images were obtained from the parasternal, apical, subcostal, and suprasternal notch acoustic windows  Image quality was adequate  LEFT VENTRICLE: Size was normal  Systolic function was normal  Ejection fraction was estimated to be 65 %  There were no regional wall motion abnormalities  Wall thickness was normal  DOPPLER: Left ventricular diastolic function parameters  were abnormal  There was no evidence of elevated ventricular filling pressure by Doppler parameters  RIGHT VENTRICLE: The size was normal  Systolic function was normal  Wall thickness was normal     LEFT ATRIUM: The atrium was mildly dilated  RIGHT ATRIUM: The atrium was mildly dilated  MITRAL VALVE: There was mild annular calcification  There was mild diffuse thickening  There was normal leaflet separation  DOPPLER: The transmitral velocity was within the normal range  There was no evidence for stenosis  There was trace  regurgitation  AORTIC VALVE: The valve was trileaflet  Leaflets exhibited normal thickness, normal cuspal separation, and sclerosis  DOPPLER: Transaortic velocity was within the normal range  There was no evidence for stenosis  There was no significant  regurgitation  TRICUSPID VALVE: The valve structure was normal  There was normal leaflet separation  DOPPLER: The transtricuspid velocity was within the normal range  There was no evidence for stenosis  There was mild regurgitation  Pulmonary artery  systolic pressure was within the normal range  PULMONIC VALVE: Leaflets exhibited normal thickness, no calcification, and normal cuspal separation   DOPPLER: The transpulmonic velocity was within the normal range  There was no significant regurgitation  PERICARDIUM: There was no pericardial effusion  The pericardium was normal in appearance  AORTA: The root exhibited normal size  SYSTEMIC VEINS: IVC: The inferior vena cava was not well visualized  PULMONARY VEINS: DOPPLER: Doppler signals were not recordable in the pulmonary vein(s)  SYSTEM MEASUREMENT TABLES    2D  %FS: 33 42 %  AV Diam: 2 94 cm  EDV(Teich): 75 34 ml  EF(Teich): 62 58 %  ESV(Teich): 28 19 ml  IVSd: 0 68 cm  LA Area: 18 29 cm2  LA Diam: 2 84 cm  LVEDV MOD A4C: 94 08 ml  LVEF MOD A4C: 73 89 %  LVESV MOD A4C: 24 57 ml  LVIDd: 4 13 cm  LVIDs: 2 75 cm  LVLd A4C: 7 89 cm  LVLs A4C: 6 cm  LVPWd: 0 88 cm  RA Area: 20 69 cm2  RVIDd: 3 26 cm  SV MOD A4C: 69 51 ml  SV(Teich): 47 15 ml    CW  TR MaxP 84 mmHg  TR Vmax: 2 28 m/s    MM  TAPSE: 2 54 cm    [facility] Accredited Echocardiography Laboratory    Prepared and electronically signed by    Krish Liang MD  Signed [id number] 13:26:56       No results found for this or any previous visit  No results found for this or any previous visit    Results for orders placed during the hospital encounter of 19   NM myocardial perfusion spect (stress and/or rest)    [address]  [phone]    Rest/Stress Gated SPECT Myocardial Perfusion Imaging After Regadenoson and Exercise    Patient: Ben Valles [facility]  MR number: [id number]  Account number: [de-identified]  : 1955  Age: 59 years  Gender: Female  Status: Outpatient  [facility]: [facility]  Height: 70 in  Weight: 197 lb  BP: 130/ 82 mmHg    Allergies: LEVOFLOXACIN, AMPICILLIN, CLINDAMYCIN    Diagnosis: I48 0 - Atrial fibrillation    Primary Physician:  Linda Campos  RN:  Mohsen Holland RN  Referring Physician:  Krish Liang MD  Technician:  Sandy Colmenares  Group:  [facility]  Report Prepared By[de-identified] Jose Castrejon RN  Interpreting Physician:  Anat Martinez MD    INDICATIONS: Coronary artery disease  HISTORY: The patient is a 59year old  female  Chest pain status: no chest pain  Other symptoms: dyspnea  Cardiovascular history: arrhythmia  Medications: a beta blocker  PHYSICAL EXAM: Baseline physical exam screening: normal and no wheezes audible  REST ECG: Atrial fibrillation  PROCEDURE: The study was performed in the the [facility]  A regadenoson infusion pharmacologic stress test was performed  Treadmill exercise testing was performed, using the Ghassan protocol  Gated SPECT myocardial  perfusion imaging was performed after stress  Systolic blood pressure was 130 mmHg, at the start of the study  Diastolic blood pressure was 82 mmHg, at the start of the study  The heart rate was 96 bpm, at the start of the study  IV double  checked  GHASSAN PROTOCOL:  HR bpm SBP mmHg DBP mmHg Symptoms  Baseline 96 130 82 none  Stage 1 184 210 80 moderate dyspnea    Regadenoson protocol:  HR bpm SBP mmHg DBP mmHg Symptoms  Baseline 109 162 80 none  2 min 105 128 70 none  4 min 109 120 70 none    STRESS SUMMARY: Duration of pharmacologic stress was 3 min and 0 sec  Maximal heart rate during stress was 210 bpm ( 133 % of maximal predicted heart rate) with limited exercise and thus she was switched to a chemical stress test  The  rate-pressure product for the peak heart rate and blood pressure was 70165  There was no chest pain during stress  The stress test was terminated due to protocol completion  Pre oxygen saturation: 99 %  Peak oxygen saturation: 99 %  Arrhythmia during stress: atrial fibrillation  The stress ECG was non-diagnostic      ISOTOPE ADMINISTRATION:  Resting isotope administration Stress isotope administration  Agent Tetrofosmin Tetrofosmin  Dose 10 6 mCi 32 6 mCi  Date 03/11/2019 03/11/2019  Injection time 12:26 14:00  Injection-image interval 42 min 46 min    The radiopharmaceutical was injected at the peak effect of pharmacologic stress  MYOCARDIAL PERFUSION IMAGING:  The image quality was good  Left ventricular size was normal  The TID ratio was 0 99  PERFUSION DEFECTS:  -  There were no perfusion defects  GATED SPECT:  The calculated left ventricular ejection fraction was 78 %  There was no left ventricular regional abnormality  SUMMARY:  -  Stress results: Maximal heart rate during stress was 210 bpm ( 133 % of maximal predicted heart rate) with limited exercise and thus she was switched to a chemical stress test  Target heart rate was achieved  There was no chest pain  during stress  -  ECG conclusions: Arrhythmia during stress: atrial fibrillation  The stress ECG was non-diagnostic   -  Perfusion imaging: There were no perfusion defects   -  Gated SPECT: The calculated left ventricular ejection fraction was 78 %  There was no left ventricular regional abnormality  IMPRESSIONS: Normal study after pharmacologic vasodilation  Myocardial perfusion imaging was normal at rest and with stress  Prepared and signed by    Audrey Masterson MD  Signed 03/11/2019 15:58:05             I reviewed and interpreted the following LABS/EKG/TELE/IMAGING and below is summary of my interpretation (if data available):    LABS:normal renal function  Normal lipids  Normal cbc    Current EKG and Rhythm Strip:Afib HR 88bpm    Past EKGs and RHYTHM strip: 1/28/19 NSR normal EKG after dccv    Echo reviewed by me shows normal EF   LA actuallylooks normal
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
## 2020-01-16 ENCOUNTER — OFFICE VISIT (OUTPATIENT)
Dept: PSYCHIATRY | Facility: CLINIC | Age: 65
End: 2020-01-16
Payer: COMMERCIAL

## 2020-01-16 VITALS
HEIGHT: 70 IN | BODY MASS INDEX: 30.92 KG/M2 | WEIGHT: 216 LBS | DIASTOLIC BLOOD PRESSURE: 75 MMHG | SYSTOLIC BLOOD PRESSURE: 132 MMHG | HEART RATE: 96 BPM

## 2020-01-16 DIAGNOSIS — Z51.81 THERAPEUTIC DRUG MONITORING: Chronic | ICD-10-CM

## 2020-01-16 DIAGNOSIS — G25.9 EXTRAPYRAMIDAL REACTION: Chronic | ICD-10-CM

## 2020-01-16 DIAGNOSIS — F31.78 BIPOLAR I DISORDER, MOST RECENT EPISODE MIXED, IN FULL REMISSION (HCC): Primary | Chronic | ICD-10-CM

## 2020-01-16 PROCEDURE — 99213 OFFICE O/P EST LOW 20 MIN: CPT | Performed by: PSYCHIATRY & NEUROLOGY

## 2020-01-16 PROCEDURE — 90833 PSYTX W PT W E/M 30 MIN: CPT | Performed by: PSYCHIATRY & NEUROLOGY

## 2020-01-16 RX ORDER — RISPERIDONE 1 MG/1
1 TABLET, FILM COATED ORAL EVERY EVENING
Qty: 30 TABLET | Refills: 4 | Status: SHIPPED | OUTPATIENT
Start: 2020-01-16 | End: 2020-05-12 | Stop reason: SDUPTHER

## 2020-01-16 RX ORDER — BENZTROPINE MESYLATE 1 MG/1
1 TABLET ORAL 2 TIMES DAILY
Qty: 60 TABLET | Refills: 4 | Status: SHIPPED | OUTPATIENT
Start: 2020-01-16 | End: 2020-05-12 | Stop reason: SDUPTHER

## 2020-01-16 RX ORDER — DIVALPROEX SODIUM 500 MG/1
500 TABLET, EXTENDED RELEASE ORAL EVERY EVENING
Qty: 30 TABLET | Refills: 4 | Status: SHIPPED | OUTPATIENT
Start: 2020-01-16 | End: 2020-06-29 | Stop reason: SDUPTHER

## 2020-01-16 NOTE — BH TREATMENT PLAN
TREATMENT PLAN (Medication Management Only)        [facility]    Name/Date of Birth/MRN:  Sabina Martel 62 y o  1955 MRN: [id number]  Date of Treatment Plan: January 16, 2020  Diagnosis/Diagnoses:   1  Bipolar I disorder, most recent episode mixed, in full remission ([facility] )    2  Extrapyramidal reaction    3  Therapeutic drug monitoring      Strengths/Personal Resources for Self-Care: "taking care of myself and staying busy"  Area/Areas of need (in own words): "my illness (A Fib, sleep apnea, bipolar disorder), extra weight"  1  Long Term Goal:   "to get as healthy as possible"  Target Date: 5 months - 6/16/2020  Person/Persons responsible for completion of goal: Nancy Luo  2  Short Term Objective (s) - How will we reach this goal?:   A  Provider new recommended medication/dosage changes and/or continue medication(s): continue current medications as prescribed (Depakote ER, Risperdal and Cogentin)  B   "to keep the medicine and plan"  C   N/A  Target Date: 5 months - 6/16/2020  Person/Persons Responsible for Completion of Goal: Nancy Luo   Progress Towards Goals: stable  Treatment Modality: medication management every 5 months  Review due 180 days from date of this plan: 6 months - 7/16/2020  Expected length of service: maintenance unless revised  My Physician/PA/NP and I have developed this plan together and I agree to work on the goals and objectives  I understand the treatment goals that were developed for my treatment    Electronic Signatures: on file (unless signed below)    Chandni Reyes MD 01/16/20

## 2020-01-17 DIAGNOSIS — I48.91 ATRIAL FIBRILLATION WITH RVR (HCC): ICD-10-CM

## 2020-01-21 RX ORDER — METOPROLOL TARTRATE 50 MG/1
50 TABLET, FILM COATED ORAL EVERY 12 HOURS SCHEDULED
Qty: 180 TABLET | Refills: 3 | Status: SHIPPED | OUTPATIENT
Start: 2020-01-21 | End: 2020-11-11

## 2020-05-12 DIAGNOSIS — F31.78 BIPOLAR I DISORDER, MOST RECENT EPISODE MIXED, IN FULL REMISSION (HCC): Primary | Chronic | ICD-10-CM

## 2020-05-12 DIAGNOSIS — G25.9 EXTRAPYRAMIDAL REACTION: Chronic | ICD-10-CM

## 2020-05-12 RX ORDER — RISPERIDONE 1 MG/1
1 TABLET, FILM COATED ORAL EVERY EVENING
Qty: 90 TABLET | Refills: 0 | Status: SHIPPED | OUTPATIENT
Start: 2020-05-12 | End: 2020-06-29 | Stop reason: SDUPTHER

## 2020-05-12 RX ORDER — BENZTROPINE MESYLATE 1 MG/1
1 TABLET ORAL 2 TIMES DAILY
Qty: 180 TABLET | Refills: 0 | Status: SHIPPED | OUTPATIENT
Start: 2020-05-12 | End: 2020-06-29 | Stop reason: SDUPTHER

## 2020-05-21 ENCOUNTER — TELEPHONE (OUTPATIENT)
Dept: FAMILY MEDICINE CLINIC | Facility: CLINIC | Age: 65
End: 2020-05-21

## 2020-06-04 ENCOUNTER — LAB (OUTPATIENT)
Dept: LAB | Facility: MEDICAL CENTER | Age: 65
End: 2020-06-04
Payer: COMMERCIAL

## 2020-06-04 DIAGNOSIS — F31.78 BIPOLAR I DISORDER, MOST RECENT EPISODE MIXED, IN FULL REMISSION (HCC): Chronic | ICD-10-CM

## 2020-06-04 DIAGNOSIS — Z51.81 THERAPEUTIC DRUG MONITORING: Chronic | ICD-10-CM

## 2020-06-04 LAB
ALBUMIN SERPL BCP-MCNC: 3.8 G/DL (ref 3.5–5)
ALP SERPL-CCNC: 53 U/L (ref 46–116)
ALT SERPL W P-5'-P-CCNC: 25 U/L (ref 12–78)
ANION GAP SERPL CALCULATED.3IONS-SCNC: 8 MMOL/L (ref 4–13)
AST SERPL W P-5'-P-CCNC: 19 U/L (ref 5–45)
BASOPHILS # BLD AUTO: 0.07 THOUSANDS/ΜL (ref 0–0.1)
BASOPHILS NFR BLD AUTO: 1 % (ref 0–1)
BILIRUB SERPL-MCNC: 0.9 MG/DL (ref 0.2–1)
BUN SERPL-MCNC: 15 MG/DL (ref 5–25)
CALCIUM SERPL-MCNC: 9 MG/DL (ref 8.3–10.1)
CHLORIDE SERPL-SCNC: 100 MMOL/L (ref 100–108)
CO2 SERPL-SCNC: 27 MMOL/L (ref 21–32)
CREAT SERPL-MCNC: 0.69 MG/DL (ref 0.6–1.3)
EOSINOPHIL # BLD AUTO: 0.12 THOUSAND/ΜL (ref 0–0.61)
EOSINOPHIL NFR BLD AUTO: 2 % (ref 0–6)
ERYTHROCYTE [DISTWIDTH] IN BLOOD BY AUTOMATED COUNT: 14.1 % (ref 11.6–15.1)
GFR SERPL CREATININE-BSD FRML MDRD: 93 ML/MIN/1.73SQ M
GLUCOSE P FAST SERPL-MCNC: 87 MG/DL (ref 65–99)
HCT VFR BLD AUTO: 42.3 % (ref 34.8–46.1)
HGB BLD-MCNC: 13.5 G/DL (ref 11.5–15.4)
IMM GRANULOCYTES # BLD AUTO: 0.02 THOUSAND/UL (ref 0–0.2)
IMM GRANULOCYTES NFR BLD AUTO: 0 % (ref 0–2)
LYMPHOCYTES # BLD AUTO: 1.79 THOUSANDS/ΜL (ref 0.6–4.47)
LYMPHOCYTES NFR BLD AUTO: 27 % (ref 14–44)
MCH RBC QN AUTO: 28.4 PG (ref 26.8–34.3)
MCHC RBC AUTO-ENTMCNC: 31.9 G/DL (ref 31.4–37.4)
MCV RBC AUTO: 89 FL (ref 82–98)
MONOCYTES # BLD AUTO: 0.54 THOUSAND/ΜL (ref 0.17–1.22)
MONOCYTES NFR BLD AUTO: 8 % (ref 4–12)
NEUTROPHILS # BLD AUTO: 4.09 THOUSANDS/ΜL (ref 1.85–7.62)
NEUTS SEG NFR BLD AUTO: 62 % (ref 43–75)
NRBC BLD AUTO-RTO: 0 /100 WBCS
PLATELET # BLD AUTO: 219 THOUSANDS/UL (ref 149–390)
PMV BLD AUTO: 11.5 FL (ref 8.9–12.7)
POTASSIUM SERPL-SCNC: 4.2 MMOL/L (ref 3.5–5.3)
PROT SERPL-MCNC: 7.2 G/DL (ref 6.4–8.2)
RBC # BLD AUTO: 4.75 MILLION/UL (ref 3.81–5.12)
SODIUM SERPL-SCNC: 135 MMOL/L (ref 136–145)
VALPROATE SERPL-MCNC: 36 UG/ML (ref 50–100)
WBC # BLD AUTO: 6.63 THOUSAND/UL (ref 4.31–10.16)

## 2020-06-04 PROCEDURE — 36415 COLL VENOUS BLD VENIPUNCTURE: CPT

## 2020-06-04 PROCEDURE — 80053 COMPREHEN METABOLIC PANEL: CPT

## 2020-06-04 PROCEDURE — 80164 ASSAY DIPROPYLACETIC ACD TOT: CPT

## 2020-06-04 PROCEDURE — 85025 COMPLETE CBC W/AUTO DIFF WBC: CPT

## 2020-06-22 ENCOUNTER — TELEPHONE (OUTPATIENT)
Dept: CARDIOLOGY CLINIC | Facility: CLINIC | Age: 65
End: 2020-06-22

## 2020-06-29 ENCOUNTER — TELEMEDICINE (OUTPATIENT)
Dept: PSYCHIATRY | Facility: CLINIC | Age: 65
End: 2020-06-29
Payer: COMMERCIAL

## 2020-06-29 VITALS — WEIGHT: 205 LBS | HEIGHT: 70 IN | BODY MASS INDEX: 29.35 KG/M2

## 2020-06-29 DIAGNOSIS — G25.9 EXTRAPYRAMIDAL REACTION: Chronic | ICD-10-CM

## 2020-06-29 DIAGNOSIS — Z51.81 THERAPEUTIC DRUG MONITORING: Chronic | ICD-10-CM

## 2020-06-29 DIAGNOSIS — F31.78 BIPOLAR I DISORDER, MOST RECENT EPISODE MIXED, IN FULL REMISSION (HCC): Primary | Chronic | ICD-10-CM

## 2020-06-29 PROCEDURE — 90833 PSYTX W PT W E/M 30 MIN: CPT | Performed by: PSYCHIATRY & NEUROLOGY

## 2020-06-29 PROCEDURE — 99214 OFFICE O/P EST MOD 30 MIN: CPT | Performed by: PSYCHIATRY & NEUROLOGY

## 2020-06-29 PROCEDURE — 3008F BODY MASS INDEX DOCD: CPT | Performed by: PSYCHIATRY & NEUROLOGY

## 2020-06-29 RX ORDER — BENZTROPINE MESYLATE 1 MG/1
1 TABLET ORAL 2 TIMES DAILY
Qty: 180 TABLET | Refills: 2 | Status: SHIPPED | OUTPATIENT
Start: 2020-06-29 | End: 2021-03-18 | Stop reason: SDUPTHER

## 2020-06-29 RX ORDER — RISPERIDONE 1 MG/1
1 TABLET, FILM COATED ORAL EVERY EVENING
Qty: 90 TABLET | Refills: 2 | Status: SHIPPED | OUTPATIENT
Start: 2020-06-29 | End: 2021-03-18 | Stop reason: SDUPTHER

## 2020-06-29 RX ORDER — DIVALPROEX SODIUM 500 MG/1
500 TABLET, EXTENDED RELEASE ORAL EVERY EVENING
Qty: 30 TABLET | Refills: 4 | Status: SHIPPED | OUTPATIENT
Start: 2020-06-29 | End: 2020-07-24

## 2020-06-30 ENCOUNTER — TELEPHONE (OUTPATIENT)
Dept: OTHER | Facility: OTHER | Age: 65
End: 2020-06-30

## 2020-07-09 ENCOUNTER — LAB (OUTPATIENT)
Dept: LAB | Facility: MEDICAL CENTER | Age: 65
End: 2020-07-09
Payer: COMMERCIAL

## 2020-07-09 DIAGNOSIS — Z51.81 THERAPEUTIC DRUG MONITORING: Chronic | ICD-10-CM

## 2020-07-09 DIAGNOSIS — F31.78 BIPOLAR I DISORDER, MOST RECENT EPISODE MIXED, IN FULL REMISSION (HCC): Chronic | ICD-10-CM

## 2020-07-09 LAB
ANION GAP SERPL CALCULATED.3IONS-SCNC: 3 MMOL/L (ref 4–13)
BUN SERPL-MCNC: 14 MG/DL (ref 5–25)
CALCIUM SERPL-MCNC: 8.1 MG/DL (ref 8.3–10.1)
CHLORIDE SERPL-SCNC: 101 MMOL/L (ref 100–108)
CO2 SERPL-SCNC: 31 MMOL/L (ref 21–32)
CREAT SERPL-MCNC: 0.67 MG/DL (ref 0.6–1.3)
GFR SERPL CREATININE-BSD FRML MDRD: 94 ML/MIN/1.73SQ M
GLUCOSE SERPL-MCNC: 66 MG/DL (ref 65–140)
POTASSIUM SERPL-SCNC: 4.1 MMOL/L (ref 3.5–5.3)
SODIUM SERPL-SCNC: 135 MMOL/L (ref 136–145)
VALPROATE SERPL-MCNC: 51 UG/ML (ref 50–100)

## 2020-07-09 PROCEDURE — 80048 BASIC METABOLIC PNL TOTAL CA: CPT

## 2020-07-09 PROCEDURE — 36415 COLL VENOUS BLD VENIPUNCTURE: CPT

## 2020-07-09 PROCEDURE — 80164 ASSAY DIPROPYLACETIC ACD TOT: CPT

## 2020-07-16 ENCOUNTER — TRANSCRIBE ORDERS (OUTPATIENT)
Dept: ADMINISTRATIVE | Facility: HOSPITAL | Age: 65
End: 2020-07-16

## 2020-07-16 ENCOUNTER — APPOINTMENT (OUTPATIENT)
Dept: LAB | Facility: MEDICAL CENTER | Age: 65
End: 2020-07-16
Payer: COMMERCIAL

## 2020-07-16 DIAGNOSIS — F31.78: ICD-10-CM

## 2020-07-16 DIAGNOSIS — Z51.81 ENCOUNTER FOR THERAPEUTIC DRUG MONITORING: ICD-10-CM

## 2020-07-16 DIAGNOSIS — Z51.81 ENCOUNTER FOR THERAPEUTIC DRUG MONITORING: Primary | ICD-10-CM

## 2020-07-24 DIAGNOSIS — F31.78 BIPOLAR I DISORDER, MOST RECENT EPISODE MIXED, IN FULL REMISSION (HCC): Chronic | ICD-10-CM

## 2020-07-24 RX ORDER — DIVALPROEX SODIUM 500 MG/1
500 TABLET, EXTENDED RELEASE ORAL EVERY EVENING
Qty: 90 TABLET | Refills: 0 | Status: SHIPPED | OUTPATIENT
Start: 2020-07-24 | End: 2020-10-19 | Stop reason: SDUPTHER

## 2020-07-24 NOTE — TELEPHONE ENCOUNTER
Dr Sri Akers ordered #30 day supply with 4 RF on 6/29/20  Néstor Thrasher requesting #90 day supply of Depakote  mg  Will ask covering provider to review in Dr Aminta Cervantes absence   Next appointment 10/29/20

## 2020-08-05 ENCOUNTER — TELEMEDICINE (OUTPATIENT)
Dept: SLEEP CENTER | Facility: CLINIC | Age: 65
End: 2020-08-05
Payer: COMMERCIAL

## 2020-08-05 DIAGNOSIS — G47.33 OSA (OBSTRUCTIVE SLEEP APNEA): Primary | ICD-10-CM

## 2020-08-05 PROCEDURE — 1036F TOBACCO NON-USER: CPT | Performed by: INTERNAL MEDICINE

## 2020-08-05 PROCEDURE — 99213 OFFICE O/P EST LOW 20 MIN: CPT | Performed by: INTERNAL MEDICINE

## 2020-08-05 NOTE — PROGRESS NOTES
Virtual Regular Visit      Assessment/Plan:  The patient is doing well on her current setting of APAP 4 to 10 cm  She is 100% compliant and has no significant complaints about her equipment  Continue same  Problem List Items Addressed This Visit     None               Reason for visit is No chief complaint on file  Encounter provider Ruba Rocha MD    Provider located at [address] [address] [phone]      Recent Visits  No visits were found meeting these conditions  Showing recent visits within past 7 days and meeting all other requirements     Future Appointments  No visits were found meeting these conditions  Showing future appointments within next 150 days and meeting all other requirements        The patient was identified by name and date of birth  Roger Baer was informed that this is a telemedicine visit and that the visit is being conducted through [employer] and patient was informed that this is not a secure, HIPAA-complaint platform  She agrees to proceed     My office door was closed  No one else was in the room  She acknowledged consent and understanding of privacy and security of the video platform  The patient has agreed to participate and understands they can discontinue the visit at any time  Patient is aware this is a billable service  Subjective  Roger Baer is a 62 y o  female with previously diagnosed obstructive sleep apnea (LILLIE = 10 5)   She is fully compliant and feels as if her sleep quality is improved        HPI     Past Medical History:   Diagnosis Date    Atrial fibrillation ([address] )     Bipolar disorder ([facility] )     bipolar    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    Sleep apnea     Varicose veins of legs        Past Surgical History:   Procedure Laterality Date    CARDIOVERSION       SECTION         Current Outpatient Medications   Medication Sig Dispense Refill    acetaminophen (TYLENOL) 325 mg tablet Take 650 mg by mouth every 6 (six) hours as needed for mild pain      apixaban (ELIQUIS) 5 mg Take 1 tablet (5 mg total) by mouth 2 (two) times a day 180 tablet 3    ascorbic acid (VITAMIN C) 500 mg tablet Take 500 mg by mouth daily      benztropine (COGENTIN) 1 mg tablet Take 1 tablet (1 mg total) by mouth 2 (two) times a day 180 tablet 2    divalproex sodium (DEPAKOTE ER) 500 mg 24 hr tablet TAKE 1 TABLET (500 MG TOTAL) BY MOUTH EVERY EVENING 90 tablet 0    metoprolol tartrate (LOPRESSOR) 50 mg tablet Take 1 tablet (50 mg total) by mouth every 12 (twelve) hours 180 tablet 3    Multiple Vitamin (MULTI-VITAMIN DAILY) TABS Take 1 tablet by mouth daily      risperiDONE (RisperDAL) 1 mg tablet Take 1 tablet (1 mg total) by mouth every evening 90 tablet 2     No current facility-administered medications for this visit  Allergies   Allergen Reactions    Levaquin [Levofloxacin]      "heavy legs"    Ampicillin Rash     Category: Allergy;     Clindamycin Rash     Category: Allergy; Review of Systems  The patient denies headache, chest pain (except in association with atrial fib), nausea, vomiting, diarrhea, abdominal pain, numbness, arthritis, weakness  Video Exam    There were no vitals filed for this visit  Physical Exam   Awake, alert  No cranial nerve abnormality  Eyes anicteric  Skin unremarkable for rash or lesion  Moves upper extremities and lower extremities  I spent Fifteen minutes directly with the patient during this visit      VIRTUAL VISIT DISCLAIMER    Shreyas Del Castillo acknowledges that she has consented to an online visit or consultation  She understands that the online visit is based solely on information provided by her, and that, in the absence of a face-to-face physical evaluation by the physician, the diagnosis she receives is both limited and provisional in terms of accuracy and completeness   This is not intended to replace a full medical face-to-face evaluation by the physician  Abran Milligan understands and accepts these terms

## 2020-09-20 ENCOUNTER — OFFICE VISIT (OUTPATIENT)
Dept: URGENT CARE | Facility: MEDICAL CENTER | Age: 65
End: 2020-09-20
Payer: COMMERCIAL

## 2020-09-20 VITALS
WEIGHT: 214 LBS | OXYGEN SATURATION: 96 % | BODY MASS INDEX: 30.64 KG/M2 | DIASTOLIC BLOOD PRESSURE: 74 MMHG | HEART RATE: 85 BPM | SYSTOLIC BLOOD PRESSURE: 130 MMHG | TEMPERATURE: 98.2 F | HEIGHT: 70 IN | RESPIRATION RATE: 18 BRPM

## 2020-09-20 DIAGNOSIS — S61.231A PUNCTURE WOUND OF LEFT INDEX FINGER: Primary | ICD-10-CM

## 2020-09-20 PROCEDURE — 90471 IMMUNIZATION ADMIN: CPT | Performed by: PHYSICIAN ASSISTANT

## 2020-09-20 PROCEDURE — 90714 TD VACC NO PRESV 7 YRS+ IM: CPT | Performed by: PHYSICIAN ASSISTANT

## 2020-09-20 PROCEDURE — G0382 LEV 3 HOSP TYPE B ED VISIT: HCPCS | Performed by: PHYSICIAN ASSISTANT

## 2020-09-20 PROCEDURE — 90715 TDAP VACCINE 7 YRS/> IM: CPT

## 2020-09-20 RX ORDER — SULFAMETHOXAZOLE AND TRIMETHOPRIM 800; 160 MG/1; MG/1
1 TABLET ORAL EVERY 12 HOURS SCHEDULED
Qty: 14 TABLET | Refills: 0 | Status: SHIPPED | OUTPATIENT
Start: 2020-09-20 | End: 2020-09-27

## 2020-09-20 NOTE — PROGRESS NOTES
[facility]        NAME: Denzel Quinonez is a 62 y o  female  : 1955    MRN: [id number]  DATE: 2020  TIME: 12:05 PM    Assessment and Plan   Puncture wound of left index finger [S61 231A]  1  Puncture wound of left index finger           Patient Instructions     Puncture wound index finger  Bactrim twice daily  Follow up with PCP in 3-5 days  Proceed to  ER if symptoms worsen  Chief Complaint     Chief Complaint   Patient presents with    Laceration     left hand , pointer finger got poked with a maría tack leonarda blood  Wants a tdap shot         History of Present Illness       61 y/o female presents c/o pain to finger  States she was picking a carpet and a maría nail pricked her finger  Denies other      Review of Systems   Review of Systems   Constitutional: Negative  HENT: Negative  Eyes: Negative  Respiratory: Negative  Negative for cough, chest tightness, shortness of breath, wheezing and stridor  Cardiovascular: Negative  Negative for chest pain, palpitations and leg swelling           Current Medications       Current Outpatient Medications:     acetaminophen (TYLENOL) 325 mg tablet, Take 650 mg by mouth every 6 (six) hours as needed for mild pain, Disp: , Rfl:     apixaban (ELIQUIS) 5 mg, Take 1 tablet (5 mg total) by mouth 2 (two) times a day, Disp: 180 tablet, Rfl: 3    ascorbic acid (VITAMIN C) 500 mg tablet, Take 500 mg by mouth daily, Disp: , Rfl:     benztropine (COGENTIN) 1 mg tablet, Take 1 tablet (1 mg total) by mouth 2 (two) times a day, Disp: 180 tablet, Rfl: 2    divalproex sodium (DEPAKOTE ER) 500 mg 24 hr tablet, TAKE 1 TABLET (500 MG TOTAL) BY MOUTH EVERY EVENING, Disp: 90 tablet, Rfl: 0    metoprolol tartrate (LOPRESSOR) 50 mg tablet, Take 1 tablet (50 mg total) by mouth every 12 (twelve) hours, Disp: 180 tablet, Rfl: 3    Multiple Vitamin (MULTI-VITAMIN DAILY) TABS, Take 1 tablet by mouth daily, Disp: , Rfl:     risperiDONE (RisperDAL) 1 mg tablet, Take 1 tablet (1 mg total) by mouth every evening, Disp: 90 tablet, Rfl: 2    Current Allergies     Allergies as of 2020 - Reviewed 2020   Allergen Reaction Noted    Levaquin [levofloxacin]  2018    Ampicillin Rash 2013    Clindamycin Rash 2013            The following portions of the patient's history were reviewed and updated as appropriate: allergies, current medications, past family history, past medical history, past social history, past surgical history and problem list      Past Medical History:   Diagnosis Date    Atrial fibrillation ([facility]     Bipolar disorder ([facility] )     bipolar    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    Sleep apnea     Varicose veins of legs        Past Surgical History:   Procedure Laterality Date    CARDIOVERSION       SECTION         Family History   Problem Relation Age of Onset    Psychiatric Illness Maternal Aunt     Breast cancer Maternal Aunt     Stroke Mother         CVA    Heart failure Mother     Diabetes Mother     Hypertension Mother     Thyroid disease Mother     Gallbladder disease Mother         gallstones    Diabetes Brother     Multiple sclerosis Brother     Diabetes Maternal Grandmother     Breast cancer Cousin          Medications have been verified  Objective   /74   Pulse 85   Temp 98 2 °F (36 8 °C) (Temporal)   Resp 18   Ht 5' 10" (1 778 m)   Wt 97 1 kg (214 lb)   SpO2 96%   BMI 30 71 kg/m²        Physical Exam     Physical Exam  Constitutional:       General: She is not in acute distress  Appearance: She is well-developed  She is not diaphoretic  HENT:      Head: Normocephalic and atraumatic  Right Ear: Hearing, tympanic membrane, ear canal and external ear normal       Left Ear: Hearing, tympanic membrane, ear canal and external ear normal       Mouth/Throat:      Pharynx: Uvula midline     Eyes:      Conjunctiva/sclera: Conjunctivae normal       Pupils: Pupils are equal, round, and reactive to light  Neck:      Musculoskeletal: Normal range of motion and neck supple  Cardiovascular:      Rate and Rhythm: Normal rate and regular rhythm  Heart sounds: Normal heart sounds  Pulmonary:      Effort: Pulmonary effort is normal       Breath sounds: Normal breath sounds  Lymphadenopathy:      Cervical: No cervical adenopathy     Skin:

## 2020-09-20 NOTE — PATIENT INSTRUCTIONS
Puncture wound index finger  Bactrim twice daily  Follow up with PCP in 3-5 days  Proceed to  ER if symptoms worsen

## 2020-09-22 ENCOUNTER — IMMUNIZATIONS (OUTPATIENT)
Dept: FAMILY MEDICINE CLINIC | Facility: CLINIC | Age: 65
End: 2020-09-22
Payer: COMMERCIAL

## 2020-09-22 DIAGNOSIS — Z23 ENCOUNTER FOR IMMUNIZATION: Primary | ICD-10-CM

## 2020-09-22 PROCEDURE — 90682 RIV4 VACC RECOMBINANT DNA IM: CPT

## 2020-09-22 PROCEDURE — 90471 IMMUNIZATION ADMIN: CPT

## 2020-10-07 ENCOUNTER — OFFICE VISIT (OUTPATIENT)
Dept: CARDIOLOGY CLINIC | Facility: CLINIC | Age: 65
End: 2020-10-07
Payer: COMMERCIAL

## 2020-10-07 VITALS
SYSTOLIC BLOOD PRESSURE: 122 MMHG | BODY MASS INDEX: 30.75 KG/M2 | WEIGHT: 214.8 LBS | DIASTOLIC BLOOD PRESSURE: 70 MMHG | HEART RATE: 92 BPM | HEIGHT: 70 IN | TEMPERATURE: 97.5 F

## 2020-10-07 DIAGNOSIS — I48.19 OTHER PERSISTENT ATRIAL FIBRILLATION (HCC): Primary | ICD-10-CM

## 2020-10-07 DIAGNOSIS — R60.9 EDEMA: ICD-10-CM

## 2020-10-07 PROCEDURE — 93000 ELECTROCARDIOGRAM COMPLETE: CPT | Performed by: INTERNAL MEDICINE

## 2020-10-07 PROCEDURE — 99215 OFFICE O/P EST HI 40 MIN: CPT | Performed by: INTERNAL MEDICINE

## 2020-10-07 RX ORDER — FUROSEMIDE 20 MG/1
20 TABLET ORAL DAILY
Qty: 90 TABLET | Refills: 3 | Status: SHIPPED | OUTPATIENT
Start: 2020-10-07 | End: 2021-10-25

## 2020-10-07 RX ORDER — POTASSIUM CHLORIDE 750 MG/1
10 CAPSULE, EXTENDED RELEASE ORAL DAILY
Qty: 90 CAPSULE | Refills: 3 | Status: SHIPPED | OUTPATIENT
Start: 2020-10-07 | End: 2021-10-25

## 2020-10-08 ENCOUNTER — HOSPITAL ENCOUNTER (OUTPATIENT)
Dept: NON INVASIVE DIAGNOSTICS | Facility: MEDICAL CENTER | Age: 65
Discharge: HOME | End: 2020-10-08
Payer: COMMERCIAL

## 2020-10-08 ENCOUNTER — OFFICE VISIT (OUTPATIENT)
Dept: FAMILY MEDICINE CLINIC | Facility: CLINIC | Age: 65
End: 2020-10-08
Payer: COMMERCIAL

## 2020-10-08 ENCOUNTER — APPOINTMENT (OUTPATIENT)
Dept: RADIOLOGY | Facility: MEDICAL CENTER | Age: 65
End: 2020-10-08
Payer: COMMERCIAL

## 2020-10-08 VITALS
TEMPERATURE: 98.8 F | RESPIRATION RATE: 16 BRPM | BODY MASS INDEX: 30.78 KG/M2 | DIASTOLIC BLOOD PRESSURE: 74 MMHG | HEIGHT: 70 IN | SYSTOLIC BLOOD PRESSURE: 122 MMHG | OXYGEN SATURATION: 98 % | HEART RATE: 73 BPM | WEIGHT: 215 LBS

## 2020-10-08 DIAGNOSIS — I48.19 OTHER PERSISTENT ATRIAL FIBRILLATION (HCC): ICD-10-CM

## 2020-10-08 DIAGNOSIS — I48.19 OTHER PERSISTENT ATRIAL FIBRILLATION (HCC): Primary | ICD-10-CM

## 2020-10-08 PROCEDURE — 1036F TOBACCO NON-USER: CPT | Performed by: PHYSICIAN ASSISTANT

## 2020-10-08 PROCEDURE — 3725F SCREEN DEPRESSION PERFORMED: CPT | Performed by: PHYSICIAN ASSISTANT

## 2020-10-08 PROCEDURE — 93306 TTE W/DOPPLER COMPLETE: CPT | Performed by: INTERNAL MEDICINE

## 2020-10-08 PROCEDURE — 71046 X-RAY EXAM CHEST 2 VIEWS: CPT

## 2020-10-08 PROCEDURE — 93306 TTE W/DOPPLER COMPLETE: CPT

## 2020-10-08 PROCEDURE — 99214 OFFICE O/P EST MOD 30 MIN: CPT | Performed by: PHYSICIAN ASSISTANT

## 2020-10-19 DIAGNOSIS — F31.78 BIPOLAR I DISORDER, MOST RECENT EPISODE MIXED, IN FULL REMISSION (HCC): Primary | Chronic | ICD-10-CM

## 2020-10-19 RX ORDER — DIVALPROEX SODIUM 500 MG/1
500 TABLET, EXTENDED RELEASE ORAL EVERY EVENING
Qty: 90 TABLET | Refills: 0 | Status: SHIPPED | OUTPATIENT
Start: 2020-10-19 | End: 2020-10-29 | Stop reason: SDUPTHER

## 2020-10-19 RX ORDER — DIVALPROEX SODIUM 500 MG/1
500 TABLET, EXTENDED RELEASE ORAL EVERY EVENING
Qty: 90 TABLET | Refills: 0 | OUTPATIENT
Start: 2020-10-19 | End: 2021-03-18

## 2020-10-29 ENCOUNTER — OFFICE VISIT (OUTPATIENT)
Dept: PSYCHIATRY | Facility: CLINIC | Age: 65
End: 2020-10-29
Payer: COMMERCIAL

## 2020-10-29 VITALS — WEIGHT: 215 LBS | HEIGHT: 70 IN | BODY MASS INDEX: 30.78 KG/M2

## 2020-10-29 DIAGNOSIS — G25.9 EXTRAPYRAMIDAL REACTION: Chronic | ICD-10-CM

## 2020-10-29 DIAGNOSIS — F31.78 BIPOLAR I DISORDER, MOST RECENT EPISODE MIXED, IN FULL REMISSION (HCC): Primary | Chronic | ICD-10-CM

## 2020-10-29 DIAGNOSIS — Z51.81 THERAPEUTIC DRUG MONITORING: Chronic | ICD-10-CM

## 2020-10-29 PROCEDURE — 90833 PSYTX W PT W E/M 30 MIN: CPT | Performed by: PSYCHIATRY & NEUROLOGY

## 2020-10-29 PROCEDURE — 99213 OFFICE O/P EST LOW 20 MIN: CPT | Performed by: PSYCHIATRY & NEUROLOGY

## 2020-10-29 RX ORDER — DIVALPROEX SODIUM 500 MG/1
500 TABLET, EXTENDED RELEASE ORAL EVERY EVENING
Qty: 90 TABLET | Refills: 2 | Status: SHIPPED | OUTPATIENT
Start: 2020-10-29 | End: 2021-09-14

## 2020-11-11 DIAGNOSIS — I48.91 ATRIAL FIBRILLATION WITH RVR (HCC): ICD-10-CM

## 2020-11-11 RX ORDER — METOPROLOL TARTRATE 50 MG/1
TABLET, FILM COATED ORAL
Qty: 180 TABLET | Refills: 2 | Status: SHIPPED | OUTPATIENT
Start: 2020-11-11 | End: 2021-10-18

## 2020-11-19 ENCOUNTER — OFFICE VISIT (OUTPATIENT)
Dept: CARDIOLOGY CLINIC | Facility: CLINIC | Age: 65
End: 2020-11-19
Payer: COMMERCIAL

## 2020-11-19 VITALS
DIASTOLIC BLOOD PRESSURE: 78 MMHG | HEART RATE: 93 BPM | WEIGHT: 217.7 LBS | TEMPERATURE: 98.7 F | HEIGHT: 70 IN | SYSTOLIC BLOOD PRESSURE: 138 MMHG | BODY MASS INDEX: 31.17 KG/M2

## 2020-11-19 DIAGNOSIS — I48.19 OTHER PERSISTENT ATRIAL FIBRILLATION (HCC): Primary | ICD-10-CM

## 2020-11-19 PROCEDURE — 99215 OFFICE O/P EST HI 40 MIN: CPT | Performed by: INTERNAL MEDICINE

## 2020-11-19 PROCEDURE — 93000 ELECTROCARDIOGRAM COMPLETE: CPT | Performed by: INTERNAL MEDICINE

## 2020-11-19 PROCEDURE — 3008F BODY MASS INDEX DOCD: CPT | Performed by: INTERNAL MEDICINE

## 2020-11-19 PROCEDURE — 1036F TOBACCO NON-USER: CPT | Performed by: INTERNAL MEDICINE

## 2021-01-05 ENCOUNTER — LAB (OUTPATIENT)
Dept: LAB | Facility: MEDICAL CENTER | Age: 66
End: 2021-01-05
Payer: COMMERCIAL

## 2021-01-05 DIAGNOSIS — F31.78 BIPOLAR I DISORDER, MOST RECENT EPISODE MIXED, IN FULL REMISSION (HCC): Chronic | ICD-10-CM

## 2021-01-05 DIAGNOSIS — Z51.81 THERAPEUTIC DRUG MONITORING: Chronic | ICD-10-CM

## 2021-01-05 LAB
ALBUMIN SERPL BCP-MCNC: 3.8 G/DL (ref 3.5–5)
ALP SERPL-CCNC: 53 U/L (ref 46–116)
ALT SERPL W P-5'-P-CCNC: 26 U/L (ref 12–78)
ANION GAP SERPL CALCULATED.3IONS-SCNC: 1 MMOL/L (ref 4–13)
AST SERPL W P-5'-P-CCNC: 19 U/L (ref 5–45)
BASOPHILS # BLD AUTO: 0.06 THOUSANDS/ΜL (ref 0–0.1)
BASOPHILS NFR BLD AUTO: 1 % (ref 0–1)
BILIRUB SERPL-MCNC: 1.02 MG/DL (ref 0.2–1)
BUN SERPL-MCNC: 23 MG/DL (ref 5–25)
CALCIUM SERPL-MCNC: 9.1 MG/DL (ref 8.3–10.1)
CHLORIDE SERPL-SCNC: 107 MMOL/L (ref 100–108)
CHOLEST SERPL-MCNC: 176 MG/DL (ref 50–200)
CO2 SERPL-SCNC: 31 MMOL/L (ref 21–32)
CREAT SERPL-MCNC: 0.82 MG/DL (ref 0.6–1.3)
EOSINOPHIL # BLD AUTO: 0.15 THOUSAND/ΜL (ref 0–0.61)
EOSINOPHIL NFR BLD AUTO: 3 % (ref 0–6)
ERYTHROCYTE [DISTWIDTH] IN BLOOD BY AUTOMATED COUNT: 14.5 % (ref 11.6–15.1)
GFR SERPL CREATININE-BSD FRML MDRD: 76 ML/MIN/1.73SQ M
GLUCOSE P FAST SERPL-MCNC: 84 MG/DL (ref 65–99)
HCT VFR BLD AUTO: 43.3 % (ref 34.8–46.1)
HDLC SERPL-MCNC: 44 MG/DL
HGB BLD-MCNC: 13.6 G/DL (ref 11.5–15.4)
IMM GRANULOCYTES # BLD AUTO: 0.02 THOUSAND/UL (ref 0–0.2)
IMM GRANULOCYTES NFR BLD AUTO: 0 % (ref 0–2)
LDLC SERPL CALC-MCNC: 111 MG/DL (ref 0–100)
LYMPHOCYTES # BLD AUTO: 2 THOUSANDS/ΜL (ref 0.6–4.47)
LYMPHOCYTES NFR BLD AUTO: 34 % (ref 14–44)
MCH RBC QN AUTO: 28.8 PG (ref 26.8–34.3)
MCHC RBC AUTO-ENTMCNC: 31.4 G/DL (ref 31.4–37.4)
MCV RBC AUTO: 92 FL (ref 82–98)
MONOCYTES # BLD AUTO: 0.51 THOUSAND/ΜL (ref 0.17–1.22)
MONOCYTES NFR BLD AUTO: 9 % (ref 4–12)
NEUTROPHILS # BLD AUTO: 3.19 THOUSANDS/ΜL (ref 1.85–7.62)
NEUTS SEG NFR BLD AUTO: 53 % (ref 43–75)
NONHDLC SERPL-MCNC: 132 MG/DL
NRBC BLD AUTO-RTO: 0 /100 WBCS
PLATELET # BLD AUTO: 220 THOUSANDS/UL (ref 149–390)
PMV BLD AUTO: 11.1 FL (ref 8.9–12.7)
POTASSIUM SERPL-SCNC: 4.3 MMOL/L (ref 3.5–5.3)
PROT SERPL-MCNC: 7.2 G/DL (ref 6.4–8.2)
RBC # BLD AUTO: 4.72 MILLION/UL (ref 3.81–5.12)
SODIUM SERPL-SCNC: 139 MMOL/L (ref 136–145)
TRIGL SERPL-MCNC: 106 MG/DL
VALPROATE SERPL-MCNC: 61 UG/ML (ref 50–100)
WBC # BLD AUTO: 5.93 THOUSAND/UL (ref 4.31–10.16)

## 2021-01-05 PROCEDURE — 85025 COMPLETE CBC W/AUTO DIFF WBC: CPT

## 2021-01-05 PROCEDURE — 80053 COMPREHEN METABOLIC PANEL: CPT

## 2021-01-05 PROCEDURE — 80061 LIPID PANEL: CPT | Performed by: PHYSICIAN ASSISTANT

## 2021-01-05 PROCEDURE — 36415 COLL VENOUS BLD VENIPUNCTURE: CPT | Performed by: PHYSICIAN ASSISTANT

## 2021-01-05 PROCEDURE — 80164 ASSAY DIPROPYLACETIC ACD TOT: CPT

## 2021-01-05 NOTE — RESULT ENCOUNTER NOTE
CBC/diff is normal  CMP normal except minimally elevated Bilirubin  Depakote level is therapeutic at 61

## 2021-01-12 ENCOUNTER — OFFICE VISIT (OUTPATIENT)
Dept: FAMILY MEDICINE CLINIC | Facility: CLINIC | Age: 66
End: 2021-01-12
Payer: COMMERCIAL

## 2021-01-12 VITALS
HEIGHT: 70 IN | SYSTOLIC BLOOD PRESSURE: 124 MMHG | HEART RATE: 120 BPM | WEIGHT: 219 LBS | DIASTOLIC BLOOD PRESSURE: 78 MMHG | TEMPERATURE: 98.1 F | RESPIRATION RATE: 16 BRPM | OXYGEN SATURATION: 98 % | BODY MASS INDEX: 31.35 KG/M2

## 2021-01-12 DIAGNOSIS — Z12.31 ENCOUNTER FOR SCREENING MAMMOGRAM FOR MALIGNANT NEOPLASM OF BREAST: ICD-10-CM

## 2021-01-12 DIAGNOSIS — I48.19 OTHER PERSISTENT ATRIAL FIBRILLATION (HCC): Primary | ICD-10-CM

## 2021-01-12 DIAGNOSIS — F31.78 BIPOLAR I DISORDER, MOST RECENT EPISODE MIXED, IN FULL REMISSION (HCC): Chronic | ICD-10-CM

## 2021-01-12 PROBLEM — R31.0 GROSS HEMATURIA: Status: RESOLVED | Noted: 2018-11-15 | Resolved: 2021-01-12

## 2021-01-12 PROCEDURE — 3725F SCREEN DEPRESSION PERFORMED: CPT | Performed by: PHYSICIAN ASSISTANT

## 2021-01-12 PROCEDURE — 99214 OFFICE O/P EST MOD 30 MIN: CPT | Performed by: PHYSICIAN ASSISTANT

## 2021-01-12 PROCEDURE — 3008F BODY MASS INDEX DOCD: CPT | Performed by: PHYSICIAN ASSISTANT

## 2021-01-12 NOTE — PROGRESS NOTES
Assessment/Plan:     Diagnoses and all orders for this visit:    Other persistent atrial fibrillation [facility])  Comments:  Patient will continue her metoprolol and Eliquis  Follow-up with Cardiology as directed    Encounter for screening mammogram for malignant neoplasm of breast  Comments:  Mammogram ordered  Orders:  -     Mammo screening bilateral w cad; Future    Bipolar I disorder, most recent episode mixed, in full remission ([email or web address] )  Comments:  Meds per Psychiatry          Subjective:      Patient ID: Barbie Zee is a 60 y o  female  Patient presents in the office for follow up chronic conditions  Patient has history of AFib  She is currently on metoprolol 50 mg twice a day for rate control and Eliquis 5 mg twice a day for anticoagulation  Patient is on Lasix and potassium 10 mEq use daily as well  She also has obstructive sleep apnea on CPAP  She does have bipolar disorder  She is under care of Psychiatry  Labs reviewed with patient show normal CMP and her lipid profile is good    Patient needs a mammogram      The following portions of the patient's history were reviewed and updated as appropriate:   She   Patient Active Problem List    Diagnosis Date Noted    DOMENIC (obstructive sleep apnea)     Screening for breast cancer 11/15/2018    Screen for colon cancer 11/15/2018    Other persistent atrial fibrillation ([facility] ) 11/15/2018    Therapeutic drug monitoring 09/13/2018    Extrapyramidal reaction 05/02/2018    Bipolar I disorder, most recent episode mixed, in full remission ([facility] ) 06/24/2013     Current Outpatient Medications   Medication Sig Dispense Refill    acetaminophen (TYLENOL) 325 mg tablet Take 650 mg by mouth every 6 (six) hours as needed for mild pain      apixaban (ELIQUIS) 5 mg Take 1 tablet (5 mg total) by mouth 2 (two) times a day 180 tablet 3    ascorbic acid (VITAMIN C) 500 mg tablet Take 500 mg by mouth daily      benztropine (COGENTIN) 1 mg tablet Take 1 tablet (1 mg total) by mouth 2 (two) times a day 180 tablet 2    divalproex sodium (DEPAKOTE ER) 500 mg 24 hr tablet Take 1 tablet (500 mg total) by mouth every evening 90 tablet 2    furosemide (LASIX) 20 mg tablet Take 1 tablet (20 mg total) by mouth daily 90 tablet 3    metoprolol tartrate (LOPRESSOR) 50 mg tablet TAKE 1 TABLET EVERY 12 HOURS 180 tablet 2    Multiple Vitamin (MULTI-VITAMIN DAILY) TABS Take 1 tablet by mouth daily      potassium chloride (MICRO-K) 10 MEQ CR capsule Take 1 capsule (10 mEq total) by mouth daily 90 capsule 3    risperiDONE (RisperDAL) 1 mg tablet Take 1 tablet (1 mg total) by mouth every evening 90 tablet 2     No current facility-administered medications for this visit  She is allergic to levaquin [levofloxacin]; ampicillin; and clindamycin       Review of Systems   Constitutional: Negative for activity change and unexpected weight change  HENT: Negative for ear pain and sore throat  Eyes: Negative for visual disturbance  Respiratory: Negative for cough, shortness of breath and wheezing  Cardiovascular: Negative for chest pain, palpitations and leg swelling  Gastrointestinal: Negative for abdominal pain, blood in stool, constipation, diarrhea, nausea and vomiting  Genitourinary: Negative for difficulty urinating  Musculoskeletal: Negative for arthralgias and myalgias  Skin: Negative for rash  Neurological: Negative for dizziness, syncope, light-headedness and headaches  Psychiatric/Behavioral: Negative for self-injury, sleep disturbance and suicidal ideas  The patient is not nervous/anxious  Objective:        Physical Exam  Vitals signs and nursing note reviewed  Constitutional:       General: She is not in acute distress  Appearance: Normal appearance  She is well-developed  She is not diaphoretic  Comments: overweight   HENT:      Head: Normocephalic and atraumatic        Right Ear: Tympanic membrane, ear canal and external ear normal       Left Ear: Tympanic membrane, ear canal and external ear normal    Eyes:      Conjunctiva/sclera: Conjunctivae normal       Pupils: Pupils are equal, round, and reactive to light  Neck:      Thyroid: No thyromegaly  Vascular: No carotid bruit  Cardiovascular:      Rate and Rhythm: Normal rate  Heart sounds: No murmur  No friction rub  No gallop  Comments: Irregular heartbeat  Known AFib  Patient currently rate controlled  Pulmonary:      Effort: Pulmonary effort is normal  No respiratory distress  Breath sounds: Normal breath sounds  No wheezing  Abdominal:      General: Bowel sounds are normal  There is no distension  Palpations: Abdomen is soft  There is no mass  Tenderness: There is no abdominal tenderness  Musculoskeletal:      Right lower leg: No edema  Left lower leg: No edema  Lymphadenopathy:      Cervical: No cervical adenopathy  Skin:     General: Skin is warm and dry  Findings: No erythema or rash  Neurological:      General: No focal deficit present  Mental Status: She is alert and oriented to person, place, and time  Psychiatric:         Mood and Affect: Mood normal          Behavior: Behavior normal          Thought Content:  Thought content normal          Judgment: Judgment normal

## 2021-01-18 DIAGNOSIS — I48.91 ATRIAL FIBRILLATION WITH RVR (HCC): ICD-10-CM

## 2021-01-19 RX ORDER — APIXABAN 5 MG/1
TABLET, FILM COATED ORAL
Qty: 60 TABLET | Refills: 8 | Status: SHIPPED | OUTPATIENT
Start: 2021-01-19 | End: 2021-10-25

## 2021-03-01 ENCOUNTER — TRANSCRIBE ORDERS (OUTPATIENT)
Dept: ADMINISTRATIVE | Facility: HOSPITAL | Age: 66
End: 2021-03-01

## 2021-03-01 DIAGNOSIS — N63.10 PAINFUL LUMPY RIGHT BREAST: Primary | ICD-10-CM

## 2021-03-01 DIAGNOSIS — N64.4 PAINFUL LUMPY RIGHT BREAST: Primary | ICD-10-CM

## 2021-03-03 ENCOUNTER — TELEPHONE (OUTPATIENT)
Dept: FAMILY MEDICINE CLINIC | Facility: CLINIC | Age: 66
End: 2021-03-03

## 2021-03-10 ENCOUNTER — HOSPITAL ENCOUNTER (OUTPATIENT)
Dept: ULTRASOUND IMAGING | Facility: CLINIC | Age: 66
Discharge: HOME | End: 2021-03-10
Payer: COMMERCIAL

## 2021-03-10 ENCOUNTER — HOSPITAL ENCOUNTER (OUTPATIENT)
Dept: MAMMOGRAPHY | Facility: CLINIC | Age: 66
Discharge: HOME | End: 2021-03-10
Payer: COMMERCIAL

## 2021-03-10 ENCOUNTER — TRANSCRIBE ORDERS (OUTPATIENT)
Dept: MAMMOGRAPHY | Facility: CLINIC | Age: 66
End: 2021-03-10

## 2021-03-10 VITALS — BODY MASS INDEX: 31.35 KG/M2 | HEIGHT: 70 IN | WEIGHT: 219 LBS

## 2021-03-10 DIAGNOSIS — N64.4 BREAST PAIN: Primary | ICD-10-CM

## 2021-03-10 DIAGNOSIS — N64.4 PAINFUL LUMPY RIGHT BREAST: ICD-10-CM

## 2021-03-10 DIAGNOSIS — N63.10 PAINFUL LUMPY RIGHT BREAST: ICD-10-CM

## 2021-03-10 PROCEDURE — G0279 TOMOSYNTHESIS, MAMMO: HCPCS

## 2021-03-10 PROCEDURE — 76642 ULTRASOUND BREAST LIMITED: CPT

## 2021-03-10 PROCEDURE — 77066 DX MAMMO INCL CAD BI: CPT
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
## 2021-03-11 DIAGNOSIS — L98.8 LESION OF SKIN OF BREAST: Primary | ICD-10-CM

## 2021-03-18 ENCOUNTER — OFFICE VISIT (OUTPATIENT)
Dept: PSYCHIATRY | Facility: CLINIC | Age: 66
End: 2021-03-18
Payer: COMMERCIAL

## 2021-03-18 VITALS — BODY MASS INDEX: 31.21 KG/M2 | HEIGHT: 70 IN | WEIGHT: 218 LBS

## 2021-03-18 DIAGNOSIS — F31.78 BIPOLAR I DISORDER, MOST RECENT EPISODE MIXED, IN FULL REMISSION (HCC): Primary | Chronic | ICD-10-CM

## 2021-03-18 DIAGNOSIS — G25.9 EXTRAPYRAMIDAL REACTION: Chronic | ICD-10-CM

## 2021-03-18 DIAGNOSIS — Z79.899 LONG-TERM USE OF HIGH-RISK MEDICATION: Chronic | ICD-10-CM

## 2021-03-18 PROCEDURE — 90833 PSYTX W PT W E/M 30 MIN: CPT | Performed by: PSYCHIATRY & NEUROLOGY

## 2021-03-18 PROCEDURE — 1036F TOBACCO NON-USER: CPT | Performed by: PSYCHIATRY & NEUROLOGY

## 2021-03-18 PROCEDURE — 3008F BODY MASS INDEX DOCD: CPT | Performed by: PSYCHIATRY & NEUROLOGY

## 2021-03-18 PROCEDURE — 99213 OFFICE O/P EST LOW 20 MIN: CPT | Performed by: PSYCHIATRY & NEUROLOGY

## 2021-03-18 RX ORDER — RISPERIDONE 1 MG/1
1 TABLET, FILM COATED ORAL EVERY EVENING
Qty: 90 TABLET | Refills: 2 | Status: SHIPPED | OUTPATIENT
Start: 2021-03-18 | End: 2021-10-05 | Stop reason: SDUPTHER

## 2021-03-18 RX ORDER — BENZTROPINE MESYLATE 1 MG/1
1 TABLET ORAL 2 TIMES DAILY
Qty: 180 TABLET | Refills: 2 | Status: SHIPPED | OUTPATIENT
Start: 2021-03-18 | End: 2021-10-05 | Stop reason: SDUPTHER

## 2021-03-18 NOTE — BH TREATMENT PLAN
TREATMENT PLAN (Medication Management Only)        [facility]    Name/Date of Birth/MRN:  dE Forrester 60 y o  1955 MRN: [id number]  Date of Treatment Plan: March 18, 2021  Diagnosis/Diagnoses:   1  Bipolar I disorder, most recent episode mixed, in full remission ([facility] )    2  Extrapyramidal reaction    3  Long-term use of high-risk medication      Strengths/Personal Resources for Self-Care: "I am very good at taking my medicine and taking care of myself"  Area/Areas of need (in own words): "get more exercise, maybe lose a little weight"  1  Long Term Goal:   maintain control of anxiety, maintain mood stability  Target Date: 4 months - 7/18/2021  Person/Persons responsible for completion of goal: Yareli Tena  2  Short Term Objective (s) - How will we reach this goal?:   A  Provider new recommended medication/dosage changes and/or continue medication(s): continue current medications as prescribed (Depakote ER, Risperdal and Cogentin)  B   N/A   C   N/A  Target Date: 4 months - 7/18/2021  Person/Persons Responsible for Completion of Goal: Yareli Tena   Progress Towards Goals: stable  Treatment Modality: medication management every 4 months  Review due 180 days from date of this plan: 6 months - 9/18/2021  Expected length of service: maintenance unless revised  My Physician/PA/NP and I have developed this plan together and I agree to work on the goals and objectives  I understand the treatment goals that were developed for my treatment    Electronic Signatures: on file (unless signed below)    Ady Srivastava MD 03/18/21

## 2021-03-30 DIAGNOSIS — Z23 ENCOUNTER FOR IMMUNIZATION: ICD-10-CM

## 2021-04-06 ENCOUNTER — IMMUNIZATIONS (OUTPATIENT)
Dept: FAMILY MEDICINE CLINIC | Facility: HOSPITAL | Age: 66
End: 2021-04-06

## 2021-04-06 DIAGNOSIS — Z23 ENCOUNTER FOR IMMUNIZATION: Primary | ICD-10-CM

## 2021-04-06 PROCEDURE — 91300 SARS-COV-2 / COVID-19 MRNA VACCINE (PFIZER-BIONTECH) 30 MCG: CPT

## 2021-04-06 PROCEDURE — 0001A SARS-COV-2 / COVID-19 MRNA VACCINE (PFIZER-BIONTECH) 30 MCG: CPT

## 2021-04-13 ENCOUNTER — TELEPHONE (OUTPATIENT)
Dept: CARDIOLOGY CLINIC | Facility: CLINIC | Age: 66
End: 2021-04-13

## 2021-04-13 NOTE — TELEPHONE ENCOUNTER
Pt called to ask if she needed to hold the Eliquiis prior to a dental extraction  Reviewed with Dr Era Deluna  2 day hold  Pt notified  Dental office notified as well via fax  [facility] and [employer]

## 2021-04-27 ENCOUNTER — IMMUNIZATIONS (OUTPATIENT)
Dept: FAMILY MEDICINE CLINIC | Facility: HOSPITAL | Age: 66
End: 2021-04-27

## 2021-04-27 DIAGNOSIS — Z23 ENCOUNTER FOR IMMUNIZATION: Primary | ICD-10-CM

## 2021-04-27 PROCEDURE — 0002A SARS-COV-2 / COVID-19 MRNA VACCINE (PFIZER-BIONTECH) 30 MCG: CPT

## 2021-04-27 PROCEDURE — 91300 SARS-COV-2 / COVID-19 MRNA VACCINE (PFIZER-BIONTECH) 30 MCG: CPT

## 2021-04-30 ENCOUNTER — TELEPHONE (OUTPATIENT)
Dept: CARDIOLOGY CLINIC | Facility: CLINIC | Age: 66
End: 2021-04-30

## 2021-04-30 NOTE — TELEPHONE ENCOUNTER
Patient called, had her oral surgery today, was off Eliquis X2 days prior  Surgeon stated best to hold Eliquis tonight still and try and resume tomorrow due to some bleeding  She wanted to make sure this was ok with you

## 2021-05-06 ENCOUNTER — TELEPHONE (OUTPATIENT)
Dept: OTHER | Facility: OTHER | Age: 66
End: 2021-05-06

## 2021-05-06 NOTE — TELEPHONE ENCOUNTER
Pt  Marcus Bob looking to schedule an appointment with Dr Salvatore Yancey for an Early Afternoon spot

## 2021-06-23 ENCOUNTER — TELEPHONE (OUTPATIENT)
Dept: CARDIOLOGY CLINIC | Facility: CLINIC | Age: 66
End: 2021-06-23

## 2021-06-23 ENCOUNTER — TELEPHONE (OUTPATIENT)
Dept: SLEEP CENTER | Facility: CLINIC | Age: 66
End: 2021-06-23

## 2021-06-23 NOTE — TELEPHONE ENCOUNTER
Patient called about recall  I advised senior leadership recommendations:    Continuous Positive Airway Pressure (CPAP) therapy was prescribed to you as a medical necessity and there are risks of discontinuing  use of the device, some of which may be long term  Symptoms you experienced before using CPAP may return such as snoring, apneas, excessive daytime sleepiness, hypertension, cardiac arrhythmias, risk of stroke, congestive heart-failure, exacerbation of COPD and potential respiratory failure  Ultimately, it is a personal decision for you to make if you continue use of an affected device or discontinue until a replacement is provided  Unfortunately, [employer] has not yet provided us with information about available devices  You can visit their website at www  PWRF/scr-update to register your device and to learn more about how [employer] plans to replace your device  Patient asking about replacement machine, I advised she will need to check with [facility] to see if she is eligible and her other options

## 2021-06-23 NOTE — TELEPHONE ENCOUNTER
P/C from pt-was diagnosed with sleep apnea and was prescribed CPAP  She has a Respironics CPAP machine, which is recalled for a foam padding that is degrading and broken down into particles and chemicals entering the body may affect the body and organs  Pt rec'd an email from Sarath Hurtado with info, and provided the email for [email or web address]    Pt is asking for your input, about wearing the CPAP, since she has the afib, and she does not go without wearing it  She is on Eliquis 5 mg BID  Thank you

## 2021-06-25 NOTE — TELEPHONE ENCOUNTER
I called the pt to advise her to call pulmonary/sleep medicine, and that it is advised to continue using a safe CPAP system, since she has a diagnosis of atrial fibrillation  She will reach out to pulmonary or [place]

## 2021-06-30 ENCOUNTER — OFFICE VISIT (OUTPATIENT)
Dept: CARDIOLOGY CLINIC | Facility: CLINIC | Age: 66
End: 2021-06-30
Payer: COMMERCIAL

## 2021-06-30 VITALS
DIASTOLIC BLOOD PRESSURE: 66 MMHG | SYSTOLIC BLOOD PRESSURE: 122 MMHG | WEIGHT: 219 LBS | HEIGHT: 70 IN | HEART RATE: 95 BPM | BODY MASS INDEX: 31.35 KG/M2 | OXYGEN SATURATION: 98 %

## 2021-06-30 DIAGNOSIS — I48.91 A-FIB (HCC): Primary | ICD-10-CM

## 2021-06-30 PROCEDURE — 99215 OFFICE O/P EST HI 40 MIN: CPT | Performed by: INTERNAL MEDICINE

## 2021-06-30 PROCEDURE — 93000 ELECTROCARDIOGRAM COMPLETE: CPT | Performed by: INTERNAL MEDICINE

## 2021-06-30 NOTE — PROGRESS NOTES
HEART  [facility]    Outpatient f/u  Today's Date: 06/30/21        Patient name: Gabriela Sandhu  YOB: 1955  Sex: female         Chief Complaint: f/u persistent afib      ASSESSMENT:  Problem List Items Addressed This Visit     None        59 yo female  1) Persistent afib- Dx Nov 2018 found incidentaly by PCP then, tried DCCV Jan 2019 and then again 2 weeks ago, unclear how long she stayed in NSR after each DCCV but is in afib today and was at 1 month f/u w Dr Patrice Silva  Her last DCCV took 4 shocks to restore NSR, 300J  Only mild LAE enlargement      She doesn't feel palpiations but notes SOB, worse edema, fatigue, decreased exercise tolerance and chest discomfort daily since dx w afib  We had offered ablation but she declined  Repeat Echo last month showed only mild LAE and noraml EF  2) [id number]=4 (female) but since persistent afib risks may underestimate     3) Chronic LE edema, added lasix last visit and slightly improved  4) Bipolar on Risperadone and divalproex  QTc 435ms today    5) DOMENIC on CPAP, dx after afib  She reports compliance but on recall so stoppped, more tired w/o it  PLAN:  1  Recommend afib ablation for symptomatic persistent afib and not antiarrhythmic canddiate  INFORMED CONSENT: Risks, benefits, and alternatives to atrial fibrillation ablation with possibly a combination of cryoballoon and radiofrequency ablation, and associated procedures such as transesophageal echocardiogram and atrial flutter ablation discussed  Alternative treatment with medical management has been tried and/or discussed  Usual pre and post operative expectations were discussed  Estimated complication rate is about 2%  Atrial fibrillation ablation was explained to be a treatment that reduces burden of atrial fibrillation but is not a cure and medications and repeat ablations are often required  Although most patients derive improvement in symptoms and burden in atrial fibrillation, there are some patients that have not improved following this procedure  Blood thinners will not be discontinued immediately after ablation and may be required long term regardless of result  The patient understood risks include but not limited to death, esophageal injury, phrenic nerve injury (diaphragm), stroke, bleeding and vascular complication, bleeding around the heart and open heart surgery  No orders of the defined types were placed in this encounter  There are no discontinued medications    HPI/Subjective:    61 yo female  1) Persistent afib- Dx Nov 2018 found incidentaly by PCP then, tried DCCV Jan 2019 and then again 2 weeks ago, unclear how long she stayed in NSR after each DCCV but is in afib today and was at 1 month f/u w Dr Richard Ryan  Her last DCCV took 4 shocks to restore NSR, 300J  Only mild LAE enlargement      She doesn't feel palpiations but notes SOB, worse edema, fatigue, decreased exercise tolerance and chest discomfort daily since dx w afib  We had offered ablation but she declined  Repeat Echo last month showed only mild LAE and noraml EF  2) [id number]=9 (female) but since persistent afib risks may underestimate     3) Chronic LE edema, added lasix last visit and slightly improved  4) Bipolar on Risperadone and divalproex  QTc 435ms today    5) DOMENIC on CPAP, dx after afib  She reports compliance but on recall so stoppped, more tired w/o it  Please note HPI is listed by problem with with update following it, it is copied again in the assessment above and reflects medical decision making as well  Complete 12 point ROS reviewed and otherwise non pertinent or negative except as per HPI   Please see paper chart for outpatient clinic patients where the patient completed the 12 point ROS survey  Past Medical History:   Diagnosis Date    Atrial fibrillation [facility] [address] [facility]     Bipolar disorder ([facility] [address] )     bipolar    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    Sleep apnea     Varicose veins of legs        Allergies   Allergen Reactions    Levaquin [Levofloxacin]      "heavy legs"    Ampicillin Rash     Category: Allergy;     Clindamycin Rash     Category: Allergy;      I reviewed the Home Medication list and Allergies in the chart  Scheduled Meds:  Current Outpatient Medications   Medication Sig Dispense Refill    acetaminophen (TYLENOL) 325 mg tablet Take 650 mg by mouth every 6 (six) hours as needed for mild pain      ascorbic acid (VITAMIN C) 500 mg tablet Take 500 mg by mouth daily      benztropine (COGENTIN) 1 mg tablet Take 1 tablet (1 mg total) by mouth 2 (two) times a day 180 tablet 2    divalproex sodium (DEPAKOTE ER) 500 mg 24 hr tablet Take 1 tablet (500 mg total) by mouth every evening 90 tablet 2    Eliquis 5 MG TAKE 1 TABLET TWICE A DAY 60 tablet 8    furosemide (LASIX) 20 mg tablet Take 1 tablet (20 mg total) by mouth daily 90 tablet 3    metoprolol tartrate (LOPRESSOR) 50 mg tablet TAKE 1 TABLET EVERY 12 HOURS 180 tablet 2    Multiple Vitamin (MULTI-VITAMIN DAILY) TABS Take 1 tablet by mouth daily      potassium chloride (MICRO-K) 10 MEQ CR capsule Take 1 capsule (10 mEq total) by mouth daily 90 capsule 3    risperiDONE (RisperDAL) 1 mg tablet Take 1 tablet (1 mg total) by mouth every evening 90 tablet 2     No current facility-administered medications for this visit       PRN Meds:         Family History   Problem Relation Age of Onset    Psychiatric Illness Maternal Aunt     Breast cancer Maternal Aunt     Stroke Mother         CVA    Heart failure Mother     Diabetes Mother     Hypertension Mother     Thyroid disease Mother     Gallbladder disease Mother         gallstones    Diabetes Maternal Grandmother     No Known Problems Maternal Grandfather     No Known Problems Paternal Grandmother     No Known Problems Paternal Grandfather     No Known Problems Maternal Aunt     No Known Problems Maternal Aunt     Substance Abuse Neg Hx     Suicide Attempts Neg Hx        Social History     Socioeconomic History    Marital status: /Civil Union     Spouse name: Not on file    Number of children: 1    Years of education: 15    Highest education level: 12th grade   Occupational History    Occupation: unemployed   Tobacco Use    Smoking status: Former Smoker    Smokeless tobacco: Never Used   Vaping Use    Vaping Use: Never used   Substance and Sexual Activity    Alcohol use: No    Drug use: No    Sexual activity: Yes     Partners: Male   Other Topics Concern    Not on file   Social History Narrative    Education: high school graduate    Learning Disabilities: none    Marital History:     Children: 1 adult son    Living Arrangement: lives in home with     Occupational History: worked in [employer] in the past, unemployed    Functioning Relationships: good support system,  is supportive    Legal History: none     History: None    Always uses seat belt      Social Determinants of Health     Financial Resource Strain: Low Risk     Difficulty of Paying Living Expenses: Not hard at all   Food Insecurity: No Food Insecurity    Worried About [address] in the Last Year: Never true    [address] [employer] in the Last Year: Never true   Transportation Needs: No Transportation Needs    Lack of Transportation (Medical): No    Lack of Transportation (Non-Medical): No   Physical Activity: Sufficiently Active    Days of Exercise per Week: 7 days    Minutes of Exercise per Session: 90 min   Stress: No Stress Concern Present    Feeling of Stress : Only a little   Social Connections:  Moderately Isolated    Frequency of Communication with Friends and Family: Twice a week    Frequency of Social Gatherings with Friends and Family: Twice a week    Attends Muslim Services: Never    Active Member of Clubs or Organizations: No    Attends Club or Organization Meetings: Never    Marital Status:    Intimate Partner Violence: Not At Risk    Fear of Current or Ex-Partner: No    Emotionally Abused: No    Physically Abused: No    Sexually Abused: No       OBJECTIVE:    /66   Pulse 95   Ht 5' 10" (1 778 m)   Wt 99 3 kg (219 lb)   SpO2 98%   BMI 31 42 kg/m²   Vitals:    06/30/21 1412   Weight: 99 3 kg (219 lb)       GEN: Now acute distress, Alert and oriented, well appearing  HEENT:wearing a maskExternal ears normal  EYES: Pupils equal, sclera anicteric, midline, normal conjuctiva  NECK: No JVD, supple, no obvious masses or thryomegaly or goiter  CARDIOVASCULAR: irreg irreg, No murmur, rub, gallops S1,S2  LUNGS: Clear To auscultation bilaterally, normal effort, no rales, rhonchi, crackles  ABDOMEN: Soft, nondistended, nontender, without obvious organomegaly or ascites  EXTREMITIES/VASCULAR: Bilateral 1+ edema w brawny skin changes  PSYCH: Normal Affect, no overt suicidal ideation, linear speech pattern without evidence of psychosis  NEURO: Grossly intact, moving all extremiteis equal, face symmetric, alert and responsive, no obvious focal defecits  HEME: No bleeding, bruising, petechia, purpura  SKIN: No significant rashes, warm, no diaphoresis or pallor                  Lab Results:       LABS:      Chemistry        Component Value Date/Time     (L) 01/08/2016 1424    K 4 3 01/05/2021 0950    K 4 3 01/08/2016 1424     01/05/2021 0950     01/08/2016 1424    CO2 31 01/05/2021 0950    CO2 30 01/08/2016 1424    BUN 23 01/05/2021 0950    BUN 16 01/08/2016 1424    CREATININE 0 82 01/05/2021 0950    CREATININE 0 71 01/08/2016 1424        Component Value Date/Time    CALCIUM 9 1 01/05/2021 0950    CALCIUM 8 9 01/08/2016 1424    ALKPHOS 53 01/05/2021 0950    ALKPHOS 59 01/08/2016 1424    AST 19 01/05/2021 0950    AST 15 2016 1424    ALT 26 2021 0950    ALT 19 2016 1424    BILITOT 0 56 2016 1424            No results found for: CHOL  Lab Results   Component Value Date    HDL 44 2021    HDL 43 2019    HDL 42 2018     Lab Results   Component Value Date    LDLCALC 111 (H) 2021    LDLCALC 102 (H) 2019    LDLCALC 97 2018     Lab Results   Component Value Date    TRIG 106 2021    TRIG 99 2019    TRIG 84 2018     No results found for: CHOLHDL    IMAGING: No results found  Cardiac testing:   Results for orders placed during the hospital encounter of 11/15/18   Echo complete with contrast if indicated    Narrative [place] [id number], [address] [place] [address]  [phone]    Transthoracic Echocardiogram  2D, M-mode, Doppler, and Color Doppler    Study date:  2018    Patient: Boy Le  MR number: [id number]  Account number: [de-identified]  : 1955  Age: 65 years  Gender: Female  Status: Inpatient  Location: Bedside  Height: 70 in  Weight: 194 7 lb  BP: 105/ 65 mmHg    Indications: Atrial fibrillation  Diagnoses: I48 0 - Atrial fibrillation    Sonographer:  Shonda Hunt RDCS  Primary Physician:  Thor Adams MD  Referring Physician:  CINDY Parada  Group:  [facility]  Interpreting Physician:  Scott Haque MD    SUMMARY    LEFT VENTRICLE:  Systolic function was normal  Ejection fraction was estimated to be 65 %  There were no regional wall motion abnormalities  LEFT ATRIUM:  The atrium was mildly dilated  RIGHT ATRIUM:  The atrium was mildly dilated  MITRAL VALVE:  There was mild annular calcification  There was trace regurgitation  TRICUSPID VALVE:  There was mild regurgitation  Pulmonary artery systolic pressure was within the normal range  HISTORY: PRIOR HISTORY: AFIB    PROCEDURE: The procedure was performed at the bedside  This was a routine study   The transthoracic approach was used  The study included complete 2D imaging, M-mode, complete spectral Doppler, and color Doppler  The heart rate was 50 bpm,  at the start of the study  Images were obtained from the parasternal, apical, subcostal, and suprasternal notch acoustic windows  Image quality was adequate  LEFT VENTRICLE: Size was normal  Systolic function was normal  Ejection fraction was estimated to be 65 %  There were no regional wall motion abnormalities  Wall thickness was normal  DOPPLER: Left ventricular diastolic function parameters  were abnormal  There was no evidence of elevated ventricular filling pressure by Doppler parameters  RIGHT VENTRICLE: The size was normal  Systolic function was normal  Wall thickness was normal     LEFT ATRIUM: The atrium was mildly dilated  RIGHT ATRIUM: The atrium was mildly dilated  MITRAL VALVE: There was mild annular calcification  There was mild diffuse thickening  There was normal leaflet separation  DOPPLER: The transmitral velocity was within the normal range  There was no evidence for stenosis  There was trace  regurgitation  AORTIC VALVE: The valve was trileaflet  Leaflets exhibited normal thickness, normal cuspal separation, and sclerosis  DOPPLER: Transaortic velocity was within the normal range  There was no evidence for stenosis  There was no significant  regurgitation  TRICUSPID VALVE: The valve structure was normal  There was normal leaflet separation  DOPPLER: The transtricuspid velocity was within the normal range  There was no evidence for stenosis  There was mild regurgitation  Pulmonary artery  systolic pressure was within the normal range  PULMONIC VALVE: Leaflets exhibited normal thickness, no calcification, and normal cuspal separation  DOPPLER: The transpulmonic velocity was within the normal range  There was no significant regurgitation  PERICARDIUM: There was no pericardial effusion   The pericardium was normal in appearance  AORTA: The root exhibited normal size  SYSTEMIC VEINS: IVC: The inferior vena cava was not well visualized  PULMONARY VEINS: DOPPLER: Doppler signals were not recordable in the pulmonary vein(s)  SYSTEM MEASUREMENT TABLES    2D  %FS: 33 42 %  AV Diam: 2 94 cm  EDV(Teich): 75 34 ml  EF(Teich): 62 58 %  ESV(Teich): 28 19 ml  IVSd: 0 68 cm  LA Area: 18 29 cm2  LA Diam: 2 84 cm  LVEDV MOD A4C: 94 08 ml  LVEF MOD A4C: 73 89 %  LVESV MOD A4C: 24 57 ml  LVIDd: 4 13 cm  LVIDs: 2 75 cm  LVLd A4C: 7 89 cm  LVLs A4C: 6 cm  LVPWd: 0 88 cm  RA Area: 20 69 cm2  RVIDd: 3 26 cm  SV MOD A4C: 69 51 ml  SV(Teich): 47 15 ml    CW  TR MaxP 84 mmHg  TR Vmax: 2 28 m/s    MM  TAPSE: 2 54 cm    [facility]    Prepared and electronically signed by    Tala Busby MD  Signed [id number] 13:26:56       No results found for this or any previous visit  No results found for this or any previous visit  Results for orders placed during the hospital encounter of 19   NM myocardial perfusion spect (stress and/or rest)    [address]  [phone]    Rest/Stress Gated SPECT Myocardial Perfusion Imaging After Regadenoson and Exercise    Patient: Angel Dixon [facility]  MR number: [id number]  Account number: [de-identified]  : 1955  Age: 59 years  Gender: Female  Status: Outpatient  Location: [address] [place] [address] and Vascular Center  Height: 70 in  Weight: 197 lb  BP: 130/ 82 mmHg    Allergies: LEVOFLOXACIN, AMPICILLIN, CLINDAMYCIN    Diagnosis: I48 0 - Atrial fibrillation    Primary Physician:  Alejo Angulo  RN:  Barbara Sen RN  Referring Physician:  Tala Busby MD  Technician:  Antonia Ramos  Group:  [facility]  Report Prepared By[de-identified]  Barbara Sen RN  Interpreting Physician:  Carolina Daigle MD    INDICATIONS: Coronary artery disease      HISTORY: The patient is a 59year old  female  Chest pain status: no chest pain  Other symptoms: dyspnea  Cardiovascular history: arrhythmia  Medications: a beta blocker  PHYSICAL EXAM: Baseline physical exam screening: normal and no wheezes audible  REST ECG: Atrial fibrillation  PROCEDURE: The study was performed in the the [facility] and [facility]  A regadenoson infusion pharmacologic stress test was performed  Treadmill exercise testing was performed, using the Ghassan protocol  Gated SPECT myocardial  perfusion imaging was performed after stress  Systolic blood pressure was 130 mmHg, at the start of the study  Diastolic blood pressure was 82 mmHg, at the start of the study  The heart rate was 96 bpm, at the start of the study  IV double  checked  GHASSAN PROTOCOL:  HR bpm SBP mmHg DBP mmHg Symptoms  Baseline 96 130 82 none  Stage 1 184 210 80 moderate dyspnea    Regadenoson protocol:  HR bpm SBP mmHg DBP mmHg Symptoms  Baseline 109 162 80 none  2 min 105 128 70 none  4 min 109 120 70 none    STRESS SUMMARY: Duration of pharmacologic stress was 3 min and 0 sec  Maximal heart rate during stress was 210 bpm ( 133 % of maximal predicted heart rate) with limited exercise and thus she was switched to a chemical stress test  The  rate-pressure product for the peak heart rate and blood pressure was 66315  There was no chest pain during stress  The stress test was terminated due to protocol completion  Pre oxygen saturation: 99 %  Peak oxygen saturation: 99 %  Arrhythmia during stress: atrial fibrillation  The stress ECG was non-diagnostic  ISOTOPE ADMINISTRATION:  Resting isotope administration Stress isotope administration  Agent Tetrofosmin Tetrofosmin  Dose 10 6 mCi 32 6 mCi  Date 03/11/2019 03/11/2019  Injection time 12:26 14:00  Injection-image interval 42 min 46 min    The radiopharmaceutical was injected at the peak effect of pharmacologic stress  MYOCARDIAL PERFUSION IMAGING:  The image quality was good   Left ventricular size was normal  The TID ratio was 0 99  PERFUSION DEFECTS:  -  There were no perfusion defects  GATED SPECT:  The calculated left ventricular ejection fraction was 78 %  There was no left ventricular regional abnormality  SUMMARY:  -  Stress results: Maximal heart rate during stress was 210 bpm ( 133 % of maximal predicted heart rate) with limited exercise and thus she was switched to a chemical stress test  Target heart rate was achieved  There was no chest pain  during stress  -  ECG conclusions: Arrhythmia during stress: atrial fibrillation  The stress ECG was non-diagnostic   -  Perfusion imaging: There were no perfusion defects   -  Gated SPECT: The calculated left ventricular ejection fraction was 78 %  There was no left ventricular regional abnormality  IMPRESSIONS: Normal study after pharmacologic vasodilation  Myocardial perfusion imaging was normal at rest and with stress  Prepared and signed by    Adia Blum MD  Signed 03/11/2019 15:58:05             I reviewed and interpreted the following LABS/EKG/TELE/IMAGING and below is summary of my interpretation (if data available):    LABS:normal renal function  Normal lipids   Normal cbc    Current EKG and Rhythm Strip:Afib HR 91bpm    Past EKGs and RHYTHM strip: 1/28/19 NSR normal EKG after dccv

## 2021-07-09 ENCOUNTER — RA CDI HCC (OUTPATIENT)
Dept: OTHER | Facility: HOSPITAL | Age: 66
End: 2021-07-09

## 2021-07-09 NOTE — PROGRESS NOTES
[facility]  coding opportunities          Chart reviewed, no opportunity found: CHART REVIEWED, NO OPPORTUNITY FOUND                     Patients insurance company: [facility] [address]  (Medicare Advantage and Commercial)

## 2021-07-15 ENCOUNTER — OFFICE VISIT (OUTPATIENT)
Dept: FAMILY MEDICINE CLINIC | Facility: CLINIC | Age: 66
End: 2021-07-15
Payer: COMMERCIAL

## 2021-07-15 VITALS
HEIGHT: 70 IN | RESPIRATION RATE: 18 BRPM | HEART RATE: 101 BPM | DIASTOLIC BLOOD PRESSURE: 80 MMHG | TEMPERATURE: 97.2 F | OXYGEN SATURATION: 98 % | WEIGHT: 218 LBS | BODY MASS INDEX: 31.21 KG/M2 | SYSTOLIC BLOOD PRESSURE: 140 MMHG

## 2021-07-15 DIAGNOSIS — I48.19 OTHER PERSISTENT ATRIAL FIBRILLATION (HCC): Primary | ICD-10-CM

## 2021-07-15 DIAGNOSIS — F31.78 BIPOLAR I DISORDER, MOST RECENT EPISODE MIXED, IN FULL REMISSION (HCC): Chronic | ICD-10-CM

## 2021-07-15 DIAGNOSIS — G47.33 OSA (OBSTRUCTIVE SLEEP APNEA): ICD-10-CM

## 2021-07-15 PROCEDURE — 3008F BODY MASS INDEX DOCD: CPT | Performed by: PHYSICIAN ASSISTANT

## 2021-07-15 PROCEDURE — 1036F TOBACCO NON-USER: CPT | Performed by: PHYSICIAN ASSISTANT

## 2021-07-15 PROCEDURE — 99214 OFFICE O/P EST MOD 30 MIN: CPT | Performed by: PHYSICIAN ASSISTANT

## 2021-07-15 NOTE — PROGRESS NOTES
BMI Counseling: Body mass index is 31 28 kg/m²  The BMI is above normal  Nutrition recommendations include decreasing portion sizes and moderation in carbohydrate intake  Exercise recommendations include exercising 3-5 times per week  Assessment/Plan:     Diagnoses and all orders for this visit:    Other persistent atrial fibrillation ([facility] [address] )  Comments:  Patient will continue metoprolol and Eliquis  Follow-up with Cardiology    DOMENIC (obstructive sleep apnea)  Comments:  Patient is on CPAP  Her CPAP machine is currently on recall    Bipolar I disorder, most recent episode mixed, in full remission [facility])  Comments:  Patient will continue her current regimen and follow-up with her psychiatrist          Subjective:      Patient ID: Shelia Romero is a 60 y o  female  Presents in the office for follow up chronic conditions  Patient has persistent AFib  She is on metoprolol for rate control and Eliquis to prevent stroke  He is following closely with her cardiology  She has had cardioversion which has not worked  They want to do cardiac ablation  Patient has obstructive sleep apnea she is on CPAP  However her CPAP machine was recalled  She is currently using a travel size CPAP machine  Patient is also on Lasix and potassium  Blood pressure is acceptable        The following portions of the patient's history were reviewed and updated as appropriate:   She   Patient Active Problem List    Diagnosis Date Noted    DOMENIC (obstructive sleep apnea)     Screening for breast cancer 11/15/2018    Screen for colon cancer 11/15/2018    Other [facility] 11/15/2018    Long-term use of high-risk medication 09/13/2018    Extrapyramidal reaction 05/02/2018    Bipolar I disorder, most recent episode mixed, in full remission ([facility] [address] ) 06/24/2013     Current Outpatient Medications   Medication Sig Dispense Refill    acetaminophen (TYLENOL) 325 mg tablet Take 650 mg by mouth every 6 (six) hours as needed for mild pain      ascorbic acid (VITAMIN C) 500 mg tablet Take 500 mg by mouth daily      benztropine (COGENTIN) 1 mg tablet Take 1 tablet (1 mg total) by mouth 2 (two) times a day 180 tablet 2    divalproex sodium (DEPAKOTE ER) 500 mg 24 hr tablet Take 1 tablet (500 mg total) by mouth every evening 90 tablet 2    Eliquis 5 MG TAKE 1 TABLET TWICE A DAY 60 tablet 8    furosemide (LASIX) 20 mg tablet Take 1 tablet (20 mg total) by mouth daily 90 tablet 3    metoprolol tartrate (LOPRESSOR) 50 mg tablet TAKE 1 TABLET EVERY 12 HOURS 180 tablet 2    Multiple Vitamin (MULTI-VITAMIN DAILY) TABS Take 1 tablet by mouth daily      potassium chloride (MICRO-K) 10 MEQ CR capsule Take 1 capsule (10 mEq total) by mouth daily 90 capsule 3    risperiDONE (RisperDAL) 1 mg tablet Take 1 tablet (1 mg total) by mouth every evening 90 tablet 2     No current facility-administered medications for this visit  She is allergic to levaquin [levofloxacin], ampicillin, and clindamycin       Review of Systems   Constitutional: Negative for activity change and unexpected weight change  HENT: Negative for ear pain and sore throat  Eyes: Negative for visual disturbance  Respiratory: Positive for shortness of breath  Negative for cough and wheezing  Cardiovascular: Positive for chest pain and leg swelling  Gastrointestinal: Negative for abdominal pain, blood in stool, constipation, diarrhea, nausea and vomiting  Genitourinary: Negative for difficulty urinating  Musculoskeletal: Negative for arthralgias and myalgias  Skin: Negative for rash  Neurological: Negative for dizziness, syncope, light-headedness and headaches  Psychiatric/Behavioral: Negative for self-injury, sleep disturbance and suicidal ideas  The patient is not nervous/anxious  Objective:        Physical Exam  Vitals and nursing note reviewed  Constitutional:       General: She is not in acute distress       Appearance: Normal appearance  She is well-developed  She is obese  She is not diaphoretic  HENT:      Head: Normocephalic and atraumatic  Right Ear: Tympanic membrane, ear canal and external ear normal       Left Ear: Tympanic membrane, ear canal and external ear normal       Mouth/Throat:      Pharynx: No posterior oropharyngeal erythema  Eyes:      Conjunctiva/sclera: Conjunctivae normal       Pupils: Pupils are equal, round, and reactive to light  Neck:      Thyroid: No thyromegaly  Vascular: No carotid bruit  Cardiovascular:      Rate and Rhythm: Normal rate  Rhythm irregular  Heart sounds: No murmur heard  No friction rub  No gallop  Pulmonary:      Effort: Pulmonary effort is normal  No respiratory distress  Breath sounds: Normal breath sounds  No wheezing  Abdominal:      General: Bowel sounds are normal  There is no distension  Palpations: Abdomen is soft  There is no mass  Tenderness: There is no abdominal tenderness  Musculoskeletal:      Right lower leg: No edema  Left lower leg: No edema  Lymphadenopathy:      Cervical: No cervical adenopathy  Skin:     General: Skin is warm and dry  Findings: No erythema or rash  Neurological:      General: No focal deficit present  Mental Status: She is alert and oriented to person, place, and time  Psychiatric:         Mood and Affect: Mood normal          Behavior: Behavior normal          Thought Content:  Thought content normal          Judgment: Judgment normal

## 2021-07-19 ENCOUNTER — TELEPHONE (OUTPATIENT)
Dept: SLEEP CENTER | Facility: CLINIC | Age: 66
End: 2021-07-19

## 2021-07-19 NOTE — TELEPHONE ENCOUNTER
Patient left voice message stating she bought a travel machine from [facility] and is not sure if the settings are correct  Called patient back  She states she already spoke to [facility] and resolved issue with her travel CPAP settings  Patient asking about getting a prescription for a replacement CPAP due to [place] recall  Patient was last seen in the office 8/5/2020  She states her machine is about 3years old  Advised that insurance likely will not cover cost of replacement and she may have to pay [place]  [employer] is currently not selling machines for cash  Patient verbalized understanding  Dr Alvina Guadarrama please write Rx for replacement CPAP  Patient uses [facility]  Once written, patient would like prescription mailed to her home

## 2021-07-20 NOTE — TELEPHONE ENCOUNTER
Patient left message, would like script faxed to [facility]   instead of mailed to her home      Patient would like to be notified when script faxed

## 2021-07-21 NOTE — TELEPHONE ENCOUNTER
Patient left message, states [facility] will not sell her a machine, she would like script faxed to [facility]  com

## 2021-08-05 ENCOUNTER — OFFICE VISIT (OUTPATIENT)
Dept: SLEEP CENTER | Facility: CLINIC | Age: 66
End: 2021-08-05
Payer: COMMERCIAL

## 2021-08-05 VITALS
SYSTOLIC BLOOD PRESSURE: 130 MMHG | WEIGHT: 218 LBS | BODY MASS INDEX: 31.21 KG/M2 | DIASTOLIC BLOOD PRESSURE: 90 MMHG | HEIGHT: 70 IN

## 2021-08-05 DIAGNOSIS — G47.33 OSA (OBSTRUCTIVE SLEEP APNEA): Primary | ICD-10-CM

## 2021-08-05 PROCEDURE — 1036F TOBACCO NON-USER: CPT | Performed by: INTERNAL MEDICINE

## 2021-08-05 PROCEDURE — 99213 OFFICE O/P EST LOW 20 MIN: CPT | Performed by: INTERNAL MEDICINE

## 2021-08-05 PROCEDURE — 3008F BODY MASS INDEX DOCD: CPT | Performed by: INTERNAL MEDICINE

## 2021-08-05 NOTE — PROGRESS NOTES
Progress Note - Sleep Center   Dionisio Valdez YXW: MRN: [id number]      Reason for Visit:  60 y  o female here for annual follow-up    Assessment:  Doing well on current therapy of APAP 4-10 cm for mild DOMENIC (AHI=10 5 cm)  Plan:  Continue same    Follow up: One year    History of Present Illness:  History of DOMENIC on PAP therapy  Fully compliant and deriving benefit      Review of Systems      Genitourinary post menopausal (no peroids)   Cardiology Frequent chest pain or angina, , palpitations/fluttering feeling in the chest and ankle/leg swelling   Gastrointestinal frequent heartburn/acid reflux   Neurology frequent headaches   Constitutional fatigue   Integumentary rash or dry skin   Psychiatry none   Musculoskeletal back pain   Pulmonary shortness of breath with activity   ENT none   Endocrine frequent urination   Hematological none         I have reviewed and updated the review of systems as necessary      Historical Information    Past Medical History:   Past Medical History:   Diagnosis Date    [facility]     B[facility] disorder ([facility] )     bipolar    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    Sleep apnea     Varicose veins of legs          Past Surgical History:   Past Surgical History:   Procedure Laterality Date    CARDIOVERSION       SECTION         Social History:   Social History     Socioeconomic History    Marital status: /Civil Union     Spouse name: None    Number of children: 1    Years of education: 15    Highest education level: 12th grade   Occupational History    Occupation: unemployed   Tobacco Use    Smoking status: Former Smoker    Smokeless tobacco: Never Used   Vaping Use    Vaping Use: Never used   Substance and Sexual Activity    Alcohol use: No    Drug use: No    Sexual activity: Yes     Partners: Male   Other Topics Concern    None   Social History Narrative    Education: high school graduate    Learning Disabilities: none    Marital History:     Children: 1 adult son    Living Arrangement: lives in home with     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, unemployed    Functioning Relationships: good support system,  is supportive    Legal History: none     History: None    Always uses seat belt      Social Determinants of Health     Financial Resource Strain: Low Risk     Difficulty of Paying Living Expenses: Not hard at all   Food Insecurity: No Food Insecurity    Worried About Running Out of Food in the Last Year: Never true    [address] in the Last Year: Never true   Transportation Needs: No Transportation Needs    Lack of Transportation (Medical): No    Lack of Transportation (Non-Medical): No   Physical Activity: Sufficiently Active    Days of Exercise per Week: 7 days    Minutes of Exercise per Session: 90 min   Stress: No Stress Concern Present    Feeling of Stress : Only a little   Social Connections:  Moderately Isolated    Frequency of Communication with Friends and Family: Twice a week    Frequency of Social Gatherings with Friends and Family: Twice a week    Attends Gnosticist Services: Never    Active Member of Clubs or Organizations: No    Attends Club or Organization Meetings: Never    Marital Status:    Intimate Partner Violence: Not At Risk    Fear of Current or Ex-Partner: No    Emotionally Abused: No    Physically Abused: No    Sexually Abused: No       Family History:   Family History   Problem Relation Age of Onset    Psychiatric Illness Maternal Aunt     Breast cancer Maternal Aunt     Stroke Mother         CVA    Heart failure Mother     Diabetes Mother     Hypertension Mother     Thyroid disease Mother     Gallbladder disease Mother         gallstones    Diabetes Maternal Grandmother     No Known Problems Maternal Grandfather     No Known Problems Paternal Grandmother     No Known Problems Paternal Grandfather     No Known Problems Maternal Aunt     No Known Problems Maternal Aunt     Substance Abuse Neg Hx     Suicide Attempts Neg Hx        Medications/Allergies:      Current Outpatient Medications:     acetaminophen (TYLENOL) 325 mg tablet, Take 650 mg by mouth every 6 (six) hours as needed for mild pain, Disp: , Rfl:     ascorbic acid (VITAMIN C) 500 mg tablet, Take 500 mg by mouth daily, Disp: , Rfl:     benztropine (COGENTIN) 1 mg tablet, Take 1 tablet (1 mg total) by mouth 2 (two) times a day, Disp: 180 tablet, Rfl: 2    Eliquis 5 MG, TAKE 1 TABLET TWICE A DAY, Disp: 60 tablet, Rfl: 8    furosemide (LASIX) 20 mg tablet, Take 1 tablet (20 mg total) by mouth daily, Disp: 90 tablet, Rfl: 3    metoprolol tartrate (LOPRESSOR) 50 mg tablet, TAKE 1 TABLET EVERY 12 HOURS, Disp: 180 tablet, Rfl: 2    Multiple Vitamin (MULTI-VITAMIN DAILY) TABS, Take 1 tablet by mouth daily, Disp: , Rfl:     potassium chloride (MICRO-K) 10 MEQ CR capsule, Take 1 capsule (10 mEq total) by mouth daily, Disp: 90 capsule, Rfl: 3    risperiDONE (RisperDAL) 1 mg tablet, Take 1 tablet (1 mg total) by mouth every evening, Disp: 90 tablet, Rfl: 2    divalproex sodium (DEPAKOTE ER) 500 mg 24 hr tablet, Take 1 tablet (500 mg total) by mouth every evening, Disp: 90 tablet, Rfl: 2          Objective      Vital Signs:   Vitals:    08/05/21 1513   BP: 130/90     Tree[place] Sleepiness Scale: Total score: 12        Physical Exam:    General: Alert, appropriate, cooperative, overweight    Head: NC/AT    Skin: Warm, dry    Neuro: No motor abnormalities, cranial nerves appear intact    Extremity: No clubbing, cyanosis      DME Provider: N/A        Counseling / Coordination of Care   I have spent 15 minutes with the patient today in which greater than 50% of this time was spent in counseling/coordination of care regarding: equipment and compliance        Continuous Positive Airway Pressure (CPAP) therapy was prescribed to you as a medical necessity and there are risks to discontinuing use of the device, some of which may be long term  Symptoms you experienced before using CPAP may return such as snoring, apneas, excessive daytime sleepiness, hypertension, cardiac arrythmias, risk of stroke, congestive heart-failure, exacerbation of COPD and potential respiratory failure  Ultimately, it is a personal decision for you to make if you continue use of an affected device or discontinue until a replacement is provided  Unfortunately, [employer] has not yet provided us with information about available devices  According to [facility], potential risks of continuing to use an affected device include irritation of skin, eyes and respiratory tract, inflammatory response, headache, asthma, adverse effects to other organs such as kidneys and liver and toxic carcinogenic effects  Board Certified Sleep Specialist    Portions of the record may have been created with voice recognition software  Occasional wrong word or "sound a like" substitutions may have occurred due to the inherent limitations of voice recognition software  Read the chart carefully and recognize, using context, where substitutions have occurred

## 2021-08-10 ENCOUNTER — TELEPHONE (OUTPATIENT)
Dept: PSYCHIATRY | Facility: CLINIC | Age: 66
End: 2021-08-10

## 2021-08-10 NOTE — TELEPHONE ENCOUNTER
Patient called to update the office that her insurance will be ending soon and she will be getting on Medicare in December  She has an appt in October that she stated she may need to push back due to not having insurance  She said she would call closer to the appt date to make that final decision

## 2021-09-13 ENCOUNTER — HOSPITAL ENCOUNTER (OUTPATIENT)
Dept: ULTRASOUND IMAGING | Facility: CLINIC | Age: 66
Discharge: HOME | End: 2021-09-13
Payer: COMMERCIAL

## 2021-09-13 VITALS — HEIGHT: 70 IN | BODY MASS INDEX: 31.21 KG/M2 | WEIGHT: 218 LBS

## 2021-09-13 DIAGNOSIS — N64.4 BREAST PAIN: ICD-10-CM

## 2021-09-13 PROCEDURE — 76642 ULTRASOUND BREAST LIMITED: CPT

## 2021-09-14 DIAGNOSIS — F31.78 BIPOLAR I DISORDER, MOST RECENT EPISODE MIXED, IN FULL REMISSION (HCC): Primary | Chronic | ICD-10-CM

## 2021-09-14 RX ORDER — DIVALPROEX SODIUM 500 MG/1
500 TABLET, EXTENDED RELEASE ORAL EVERY EVENING
Qty: 90 TABLET | Refills: 0 | Status: SHIPPED | OUTPATIENT
Start: 2021-09-14 | End: 2021-10-05 | Stop reason: SDUPTHER

## 2021-09-22 ENCOUNTER — OFFICE VISIT (OUTPATIENT)
Dept: CARDIOLOGY CLINIC | Facility: CLINIC | Age: 66
End: 2021-09-22
Payer: COMMERCIAL

## 2021-09-22 ENCOUNTER — IMMUNIZATIONS (OUTPATIENT)
Dept: FAMILY MEDICINE CLINIC | Facility: CLINIC | Age: 66
End: 2021-09-22
Payer: COMMERCIAL

## 2021-09-22 VITALS
HEIGHT: 70 IN | HEART RATE: 83 BPM | SYSTOLIC BLOOD PRESSURE: 138 MMHG | WEIGHT: 220.3 LBS | DIASTOLIC BLOOD PRESSURE: 82 MMHG | BODY MASS INDEX: 31.54 KG/M2

## 2021-09-22 DIAGNOSIS — Z23 ENCOUNTER FOR IMMUNIZATION: Primary | ICD-10-CM

## 2021-09-22 DIAGNOSIS — I48.19 OTHER PERSISTENT ATRIAL FIBRILLATION (HCC): Primary | ICD-10-CM

## 2021-09-22 PROCEDURE — 99213 OFFICE O/P EST LOW 20 MIN: CPT | Performed by: INTERNAL MEDICINE

## 2021-09-22 PROCEDURE — 1036F TOBACCO NON-USER: CPT | Performed by: INTERNAL MEDICINE

## 2021-09-22 PROCEDURE — 90682 RIV4 VACC RECOMBINANT DNA IM: CPT

## 2021-09-22 PROCEDURE — 90471 IMMUNIZATION ADMIN: CPT

## 2021-09-22 PROCEDURE — 3008F BODY MASS INDEX DOCD: CPT | Performed by: INTERNAL MEDICINE

## 2021-09-22 PROCEDURE — 93000 ELECTROCARDIOGRAM COMPLETE: CPT | Performed by: INTERNAL MEDICINE

## 2021-09-22 RX ORDER — AMIODARONE HYDROCHLORIDE 100 MG/1
100 TABLET ORAL DAILY
Qty: 90 TABLET | Refills: 3 | Status: SHIPPED | OUTPATIENT
Start: 2021-09-22 | End: 2022-04-06

## 2021-09-22 NOTE — PROGRESS NOTES
HEART  [facility]    Outpatient f/u  Today's Date: 09/22/21        Patient name: Emy Reeder  YOB: 1955  Sex: female         Chief Complaint: f/u persistent afib      ASSESSMENT:  Problem List Items Addressed This Visit        Cardiovascular and Mediastinum    Other persistent atrial fibrillation [facility]) - Primary    Relevant Orders    POCT ECG        61 yo female  1) Persistent afib- Dx Nov 2018 found incidentaly by PCP then, tried DCCV Jan 2019 and then again 2 weeks ago, unclear how long she stayed in NSR after each DCCV but is in afib today and was at 1 month f/u w Dr Thompson Serrano  Her last DCCV took 4 shocks to restore NSR, 300J  Only mild LAE enlargement      She doesn't feel palpiations but notes SOB, worse edema, fatigue, decreased exercise tolerance and chest discomfort daily since dx w afib  Repeat Echo last month showed only mild LAE and noraml EF  Was planning abaltion but has insurance issues        2) [id number]=4 (female) but since persistent afib risks may underestimate     3) Chronic LE edema, added lasix last visit and slightly improved  4) Bipolar on Risperadone and divalproex  QTc 435ms today    5) DOMENIC on CPAP, dx after afib  She reports compliance but on recall so stoppped, more tired w/o it  PLAN:  1  When she gets medicare in December kayla schedule afib ablation, cryo, navx, WALESKA w post wall isolation  2  Start Amiodarone 100mg daily now and plan to stop 3 months post ablation  Risks of side effects (mostly long term) discussed with her  Orders Placed This Encounter   Procedures    POCT ECG     There are no discontinued medications        HPI/Subjective:      61 yo female  1) Persistent afib- Dx Nov 2018 found incidentaly by PCP then, tried DCCV Jan 2019 and then again 2 weeks ago, unclear how long she stayed in NSR after each DCCV but is in afib today and was at 1 month f/u w Dr Maulik De La Torre  Her last DCCV took 4 shocks to restore NSR, 300J  Only mild LAE enlargement      She doesn't feel palpiations but notes SOB, worse edema, fatigue, decreased exercise tolerance and chest discomfort daily since dx w afib  Repeat Echo last month showed only mild LAE and noraml EF  Was planning abaltion but has insurance issues        2) [id number]=6 (female) but since persistent afib risks may underestimate     3) Chronic LE edema, added lasix last visit and slightly improved  4) Bipolar on Risperadone and divalproex  QTc 435ms today    5) DOMENIC on CPAP, dx after afib  She reports compliance but on recall so stoppped, more tired w/o it  Complete 12 point ROS reviewed and otherwise non pertinent or negative except as per HPI  Please see paper chart for outpatient clinic patients where the patient completed the 12 point ROS survey  Past Medical History:   Diagnosis Date    [facility]     Bipolar disorder ([facility] )     bipolar    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    Sleep apnea     Varicose veins of legs        Allergies   Allergen Reactions    Levaquin [Levofloxacin]      "heavy legs"    Ampicillin Rash     Category: Allergy;     Clindamycin Rash     Category: Allergy;      I reviewed the Home Medication list and Allergies in the chart     Scheduled Meds:  Current Outpatient Medications   Medication Sig Dispense Refill    acetaminophen (TYLENOL) 325 mg tablet Take 650 mg by mouth every 6 (six) hours as needed for mild pain      ascorbic acid (VITAMIN C) 500 mg tablet Take 500 mg by mouth daily      benztropine (COGENTIN) 1 mg tablet Take 1 tablet (1 mg total) by mouth 2 (two) times a day 180 tablet 2    divalproex sodium (DEPAKOTE ER) 500 mg 24 hr tablet Take 1 tablet (500 mg total) by mouth every evening 90 tablet 0    Eliquis 5 MG TAKE 1 TABLET TWICE A DAY 60 tablet 8    furosemide (LASIX) 20 mg tablet Take 1 tablet (20 mg total) by mouth daily 90 tablet 3    metoprolol tartrate (LOPRESSOR) 50 mg tablet TAKE 1 TABLET EVERY 12 HOURS 180 tablet 2    Multiple Vitamin (MULTI-VITAMIN DAILY) TABS Take 1 tablet by mouth daily      potassium chloride (MICRO-K) 10 MEQ CR capsule Take 1 capsule (10 mEq total) by mouth daily 90 capsule 3    risperiDONE (RisperDAL) 1 mg tablet Take 1 tablet (1 mg total) by mouth every evening 90 tablet 2     No current facility-administered medications for this visit       PRN Meds:         Family History   Problem Relation Age of Onset    Psychiatric Illness Maternal Aunt     Breast cancer Maternal Aunt     Stroke Mother         CVA    Heart failure Mother     Diabetes Mother     Hypertension Mother     Thyroid disease Mother     Gallbladder disease Mother         gallstones    Diabetes Maternal Grandmother     No Known Problems Maternal Grandfather     No Known Problems Paternal Grandmother     No Known Problems Paternal Grandfather     No Known Problems Maternal Aunt     No Known Problems Maternal Aunt     Substance Abuse Neg Hx     Suicide Attempts Neg Hx        Social History     Socioeconomic History    Marital status: /Civil Union     Spouse name: Not on file    Number of children: 1    Years of education: 15    Highest education level: 12th grade   Occupational History    Occupation: unemployed   Tobacco Use    Smoking status: Former Smoker    Smokeless tobacco: Never Used   Vaping Use    Vaping Use: Never used   Substance and Sexual Activity    Alcohol use: No    Drug use: No    Sexual activity: Yes     Partners: Male   Other Topics Concern    Not on file   Social History Narrative    Education: high school graduate    Learning Disabilities: none    Marital History:     Children: 1 adult son    Living Arrangement: lives in home with     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, unemployed Functioning Relationships: good support system,  is supportive    Legal History: none     History: None    Always uses seat belt      Social Determinants of Health     Financial Resource Strain: Low Risk     Difficulty of Paying Living Expenses: Not hard at all   Food Insecurity: No Food Insecurity    Worried About Running Out of Food in the Last Year: Never true    Lito of Food in the Last Year: Never true   Transportation Needs: No Transportation Needs    Lack of Transportation (Medical): No    Lack of Transportation (Non-Medical): No   Physical Activity: Sufficiently Active    Days of Exercise per Week: 7 days    Minutes of Exercise per Session: 90 min   Stress: No Stress Concern Present    Feeling of Stress : Only a little   Social Connections: Moderately Isolated    Frequency of Communication with Friends and Family: Twice a week    Frequency of Social Gatherings with Friends and Family: Twice a week    Attends Restorationist Services: Never    Active Member of Clubs or Organizations: No    Attends Club or Organization Meetings: Never    Marital Status:    Intimate Partner Violence: Not At Risk    Fear of Current or Ex-Partner: No    Emotionally Abused: No    Physically Abused: No    Sexually Abused: No       OBJECTIVE:    /82 (BP Location: Left arm, Patient Position: Sitting, Cuff Size: Adult)   Pulse 83   Ht 5' 10" (1 778 m)   Wt 99 9 kg (220 lb 4 8 oz)   BMI 31 61 kg/m²   Vitals:    09/22/21 1313   Weight: 99 9 kg (220 lb 4 8 oz)     /82 (BP Location: Left arm, Patient Position: Sitting, Cuff Size: Adult)   Pulse 83   Ht 5' 10" (1 778 m)   Wt 99 9 kg (220 lb 4 8 oz)   BMI 31 61 kg/m²   Vitals:    09/22/21 1313   Weight: 99 9 kg (220 lb 4 8 oz)     GEN: No acute distress, Alert and oriented, well appearing  HEENT:External ears normal, wearing a mask     EYES: Pupils equal, sclera anicteric, midline, normal conjuctiva  NECK: No JVD, supple, no obvious masses or thryomegaly or goiter  CARDIOVASCULAR:  RRR, No murmur, rub, gallops S1,S2  LUNGS: Clear To auscultation bilaterally, normal effort, no rales, rhonchi, crackles   ABDOMEN:  nondistended,  without obvious organomegaly or ascites  EXTREMITIES/VASCULAR:  No edema  warm an well perfused  PSYCH: Normal Affect,  linear speech pattern without evidence of psychosis  NEURO: Grossly intact, moving all extremiteis equal, face symmetric, alert and responsive, no obvious focal defecits   GAIT:  Ambulates normally without difficulty  HEME: No bleeding, bruising, petechia, purpura   SKIN: No significant rashes on visibile skin, warm, no diaphoresis or pallor  Lab Results:       LABS:      Chemistry        Component Value Date/Time     (L) 01/08/2016 1424    K 4 3 01/05/2021 0950    K 4 3 01/08/2016 1424     01/05/2021 0950     01/08/2016 1424    CO2 31 01/05/2021 0950    CO2 30 01/08/2016 1424    BUN 23 01/05/2021 0950    BUN 16 01/08/2016 1424    CREATININE 0 82 01/05/2021 0950    CREATININE 0 71 01/08/2016 1424        Component Value Date/Time    CALCIUM 9 1 01/05/2021 0950    CALCIUM 8 9 01/08/2016 1424    ALKPHOS 53 01/05/2021 0950    ALKPHOS 59 01/08/2016 1424    AST 19 01/05/2021 0950    AST 15 01/08/2016 1424    ALT 26 01/05/2021 0950    ALT 19 01/08/2016 1424    BILITOT 0 56 01/08/2016 1424            No results found for: CHOL  Lab Results   Component Value Date    HDL 44 01/05/2021    HDL 43 11/06/2019    HDL 42 11/08/2018     Lab Results   Component Value Date    LDLCALC 111 (H) 01/05/2021    LDLCALC 102 (H) 11/06/2019    LDLCALC 97 11/08/2018     Lab Results   Component Value Date    TRIG 106 01/05/2021    TRIG 99 11/06/2019    TRIG 84 11/08/2018     No results found for: CHOLHDL    IMAGING: No results found       Cardiac testing:   Results for orders placed during the hospital encounter of 11/15/18   Echo complete with contrast if indicated    Fabien Wheeler [address]  [phone]    Transthoracic Echocardiogram  2D, M-mode, Doppler, and Color Doppler    Study date:  2018    Patient: Beto Escobedo  MR number: [id number]  Account number: [de-identified]  : 1955  Age: 65 years  Gender: Female  Status: Inpatient  Location: Bedside  Height: 70 in  Weight: 194 7 lb  BP: 105/ 65 mmHg    Indications: Atrial fibrillation  Diagnoses: I48 0 - Atrial fibrillation    Sonographer:  Meggan Beard RDCS  Primary Physician:  Lyly Bond MD  Referring Physician:  CINDY Billingsley  Group:  [facility]  Interpreting Physician:  Rosa Elena Martinez MD    SUMMARY    LEFT VENTRICLE:  Systolic function was normal  Ejection fraction was estimated to be 65 %  There were no regional wall motion abnormalities  LEFT ATRIUM:  The atrium was mildly dilated  RIGHT ATRIUM:  The atrium was mildly dilated  MITRAL VALVE:  There was mild annular calcification  There was trace regurgitation  TRICUSPID VALVE:  There was mild regurgitation  Pulmonary artery systolic pressure was within the normal range  HISTORY: PRIOR HISTORY: AFIB    PROCEDURE: The procedure was performed at the bedside  This was a routine study  The transthoracic approach was used  The study included complete 2D imaging, M-mode, complete spectral Doppler, and color Doppler  The heart rate was 50 bpm,  at the start of the study  Images were obtained from the parasternal, apical, subcostal, and suprasternal notch acoustic windows  Image quality was adequate  LEFT VENTRICLE: Size was normal  Systolic function was normal  Ejection fraction was estimated to be 65 %  There were no regional wall motion abnormalities  Wall thickness was normal  DOPPLER: Left ventricular diastolic function parameters  were abnormal  There was no evidence of elevated ventricular filling pressure by Doppler parameters      RIGHT VENTRICLE: The size was normal  Systolic function was normal  Wall thickness was normal     LEFT ATRIUM: The atrium was mildly dilated  RIGHT ATRIUM: The atrium was mildly dilated  MITRAL VALVE: There was mild annular calcification  There was mild diffuse thickening  There was normal leaflet separation  DOPPLER: The transmitral velocity was within the normal range  There was no evidence for stenosis  There was trace  regurgitation  AORTIC VALVE: The valve was trileaflet  Leaflets exhibited normal thickness, normal cuspal separation, and sclerosis  DOPPLER: Transaortic velocity was within the normal range  There was no evidence for stenosis  There was no significant  regurgitation  TRICUSPID VALVE: The valve structure was normal  There was normal leaflet separation  DOPPLER: The transtricuspid velocity was within the normal range  There was no evidence for stenosis  There was mild regurgitation  Pulmonary artery  systolic pressure was within the normal range  PULMONIC VALVE: Leaflets exhibited normal thickness, no calcification, and normal cuspal separation  DOPPLER: The transpulmonic velocity was within the normal range  There was no significant regurgitation  PERICARDIUM: There was no pericardial effusion  The pericardium was normal in appearance  AORTA: The root exhibited normal size  SYSTEMIC VEINS: IVC: The inferior vena cava was not well visualized  PULMONARY VEINS: DOPPLER: Doppler signals were not recordable in the pulmonary vein(s)      SYSTEM MEASUREMENT TABLES    2D  %FS: 33 42 %  AV Diam: 2 94 cm  EDV(Teich): 75 34 ml  EF(Teich): 62 58 %  ESV(Teich): 28 19 ml  IVSd: 0 68 cm  LA Area: 18 29 cm2  LA Diam: 2 84 cm  LVEDV MOD A4C: 94 08 ml  LVEF MOD A4C: 73 89 %  LVESV MOD A4C: 24 57 ml  LVIDd: 4 13 cm  LVIDs: 2 75 cm  LVLd A4C: 7 89 cm  LVLs A4C: 6 cm  LVPWd: 0 88 cm  RA Area: 20 69 cm2  RVIDd: 3 26 cm  SV MOD A4C: 69 51 ml  SV(Teich): 47 15 ml    CW  TR MaxP 84 mmHg  TR Vmax: 2 28 m/s    MM  TAPSE: 2 54 cm    [facility]    Prepared and electronically signed by    Mandi Villareal MD  Signed [id number] 13:26:56       No results found for this or any previous visit  No results found for this or any previous visit  Results for orders placed during the hospital encounter of 19   NM myocardial perfusion spect (stress and/or rest)    Fabien Dockery [address] [facility]  [phone]    Rest/Stress Gated SPECT Myocardial Perfusion Imaging After Regadenoson and Exercise    Patient: Jaison [facility]  MR number: [id number]  Account number: [de-identified]  : 1955  Age: 59 years  Gender: Female  Status: Outpatient  Location: [facility]  Height: 70 in  Weight: 197 lb  BP: 130/ 82 mmHg    Allergies: LEVOFLOXACIN, AMPICILLIN, CLINDAMYCIN    Diagnosis: I48 0 - Atrial fibrillation    Primary Physician:  Blessing Vasquez  RN:  Bhargav Cordero RN  Referring Physician:  Mandi Villareal MD  Technician:  Bethanie Nj  Group:  [facility]  Report Prepared By[de-identified]  Bhargav Cordero RN  Interpreting Physician:  Joel Rice MD    INDICATIONS: Coronary artery disease  HISTORY: The patient is a 59year old  female  Chest pain status: no chest pain  Other symptoms: dyspnea  Cardiovascular history: arrhythmia  Medications: a beta blocker  PHYSICAL EXAM: Baseline physical exam screening: normal and no wheezes audible  REST ECG: Atrial fibrillation  PROCEDURE: The study was performed in the the [facility]  A regadenoson infusion pharmacologic stress test was performed  Treadmill exercise testing was performed, using the Chaparro protocol  Gated SPECT myocardial  perfusion imaging was performed after stress  Systolic blood pressure was 130 mmHg, at the start of the study  Diastolic blood pressure was 82 mmHg, at the start of the study   The heart rate was 96 bpm, at the start of the study  IV double  checked  GHASSAN PROTOCOL:  HR bpm SBP mmHg DBP mmHg Symptoms  Baseline 96 130 82 none  Stage 1 184 210 80 moderate dyspnea    Regadenoson protocol:  HR bpm SBP mmHg DBP mmHg Symptoms  Baseline 109 162 80 none  2 min 105 128 70 none  4 min 109 120 70 none    STRESS SUMMARY: Duration of pharmacologic stress was 3 min and 0 sec  Maximal heart rate during stress was 210 bpm ( 133 % of maximal predicted heart rate) with limited exercise and thus she was switched to a chemical stress test  The  rate-pressure product for the peak heart rate and blood pressure was 60458  There was no chest pain during stress  The stress test was terminated due to protocol completion  Pre oxygen saturation: 99 %  Peak oxygen saturation: 99 %  Arrhythmia during stress: atrial fibrillation  The stress ECG was non-diagnostic  ISOTOPE ADMINISTRATION:  Resting isotope administration Stress isotope administration  Agent Tetrofosmin Tetrofosmin  Dose 10 6 mCi 32 6 mCi  Date 03/11/2019 03/11/2019  Injection time 12:26 14:00  Injection-image interval 42 min 46 min    The radiopharmaceutical was injected at the peak effect of pharmacologic stress  MYOCARDIAL PERFUSION IMAGING:  The image quality was good  Left ventricular size was normal  The TID ratio was 0 99  PERFUSION DEFECTS:  -  There were no perfusion defects  GATED SPECT:  The calculated left ventricular ejection fraction was 78 %  There was no left ventricular regional abnormality  SUMMARY:  -  Stress results: Maximal heart rate during stress was 210 bpm ( 133 % of maximal predicted heart rate) with limited exercise and thus she was switched to a chemical stress test  Target heart rate was achieved  There was no chest pain  during stress  -  ECG conclusions: Arrhythmia during stress: atrial fibrillation  The stress ECG was non-diagnostic   -  Perfusion imaging:  There were no perfusion defects   -  Gated SPECT: The calculated left ventricular ejection fraction was 78 %  There was no left ventricular regional abnormality  IMPRESSIONS: Normal study after pharmacologic vasodilation  Myocardial perfusion imaging was normal at rest and with stress  Prepared and signed by    Amanda Cantu MD  Signed 03/11/2019 15:58:05             I reviewed and interpreted the following LABS/EKG/TELE/IMAGING and below is summary of my interpretation (if data available):    LABS:normal renal function  Normal lipids   Normal cbc    Current EKG and Rhythm Strip:Afib HR 83bpm    Past EKGs and RHYTHM strip: 1/28/19 NSR normal EKG after dccv

## 2021-10-05 ENCOUNTER — OFFICE VISIT (OUTPATIENT)
Dept: PSYCHIATRY | Facility: CLINIC | Age: 66
End: 2021-10-05
Payer: COMMERCIAL

## 2021-10-05 VITALS
HEART RATE: 94 BPM | BODY MASS INDEX: 31.64 KG/M2 | DIASTOLIC BLOOD PRESSURE: 82 MMHG | HEIGHT: 70 IN | WEIGHT: 221 LBS | SYSTOLIC BLOOD PRESSURE: 154 MMHG

## 2021-10-05 DIAGNOSIS — Z79.899 LONG-TERM USE OF HIGH-RISK MEDICATION: Chronic | ICD-10-CM

## 2021-10-05 DIAGNOSIS — G25.9 EXTRAPYRAMIDAL REACTION: Chronic | ICD-10-CM

## 2021-10-05 DIAGNOSIS — F31.78 BIPOLAR I DISORDER, MOST RECENT EPISODE MIXED, IN FULL REMISSION (HCC): Primary | Chronic | ICD-10-CM

## 2021-10-05 PROCEDURE — 99214 OFFICE O/P EST MOD 30 MIN: CPT | Performed by: PSYCHIATRY & NEUROLOGY

## 2021-10-05 RX ORDER — BENZTROPINE MESYLATE 1 MG/1
1 TABLET ORAL 2 TIMES DAILY
Qty: 180 TABLET | Refills: 2 | Status: SHIPPED | OUTPATIENT
Start: 2021-10-05 | End: 2022-07-26 | Stop reason: SDUPTHER

## 2021-10-05 RX ORDER — RISPERIDONE 1 MG/1
1 TABLET, FILM COATED ORAL EVERY EVENING
Qty: 90 TABLET | Refills: 2 | Status: SHIPPED | OUTPATIENT
Start: 2021-10-05 | End: 2022-07-26 | Stop reason: SDUPTHER

## 2021-10-05 RX ORDER — DIVALPROEX SODIUM 500 MG/1
500 TABLET, EXTENDED RELEASE ORAL EVERY EVENING
Qty: 90 TABLET | Refills: 2 | Status: SHIPPED | OUTPATIENT
Start: 2021-10-05 | End: 2022-07-26 | Stop reason: SDUPTHER

## 2021-10-07 ENCOUNTER — TELEPHONE (OUTPATIENT)
Dept: PSYCHIATRY | Facility: CLINIC | Age: 66
End: 2021-10-07

## 2021-10-07 ENCOUNTER — LAB (OUTPATIENT)
Dept: LAB | Facility: MEDICAL CENTER | Age: 66
End: 2021-10-07
Payer: COMMERCIAL

## 2021-10-07 DIAGNOSIS — F31.78 BIPOLAR I DISORDER, MOST RECENT EPISODE MIXED, IN FULL REMISSION (HCC): Chronic | ICD-10-CM

## 2021-10-07 DIAGNOSIS — Z79.899 LONG-TERM USE OF HIGH-RISK MEDICATION: Chronic | ICD-10-CM

## 2021-10-07 LAB
ALBUMIN SERPL BCP-MCNC: 3.6 G/DL (ref 3.5–5)
ALP SERPL-CCNC: 50 U/L (ref 46–116)
ALT SERPL W P-5'-P-CCNC: 25 U/L (ref 12–78)
ANION GAP SERPL CALCULATED.3IONS-SCNC: 2 MMOL/L (ref 4–13)
AST SERPL W P-5'-P-CCNC: 19 U/L (ref 5–45)
BASOPHILS # BLD AUTO: 0.06 THOUSANDS/ΜL (ref 0–0.1)
BASOPHILS NFR BLD AUTO: 1 % (ref 0–1)
BILIRUB SERPL-MCNC: 0.92 MG/DL (ref 0.2–1)
BUN SERPL-MCNC: 19 MG/DL (ref 5–25)
CALCIUM SERPL-MCNC: 9.4 MG/DL (ref 8.3–10.1)
CHLORIDE SERPL-SCNC: 100 MMOL/L (ref 100–108)
CHOLEST SERPL-MCNC: 172 MG/DL (ref 50–200)
CO2 SERPL-SCNC: 31 MMOL/L (ref 21–32)
CREAT SERPL-MCNC: 0.75 MG/DL (ref 0.6–1.3)
EOSINOPHIL # BLD AUTO: 0.1 THOUSAND/ΜL (ref 0–0.61)
EOSINOPHIL NFR BLD AUTO: 2 % (ref 0–6)
ERYTHROCYTE [DISTWIDTH] IN BLOOD BY AUTOMATED COUNT: 14.4 % (ref 11.6–15.1)
EST. AVERAGE GLUCOSE BLD GHB EST-MCNC: 120 MG/DL
GFR SERPL CREATININE-BSD FRML MDRD: 85 ML/MIN/1.73SQ M
GLUCOSE P FAST SERPL-MCNC: 83 MG/DL (ref 65–99)
HBA1C MFR BLD: 5.8 %
HCT VFR BLD AUTO: 41.8 % (ref 34.8–46.1)
HDLC SERPL-MCNC: 44 MG/DL
HGB BLD-MCNC: 13.4 G/DL (ref 11.5–15.4)
IMM GRANULOCYTES # BLD AUTO: 0.01 THOUSAND/UL (ref 0–0.2)
IMM GRANULOCYTES NFR BLD AUTO: 0 % (ref 0–2)
LDLC SERPL CALC-MCNC: 110 MG/DL (ref 0–100)
LYMPHOCYTES # BLD AUTO: 2.09 THOUSANDS/ΜL (ref 0.6–4.47)
LYMPHOCYTES NFR BLD AUTO: 32 % (ref 14–44)
MCH RBC QN AUTO: 28.8 PG (ref 26.8–34.3)
MCHC RBC AUTO-ENTMCNC: 32.1 G/DL (ref 31.4–37.4)
MCV RBC AUTO: 90 FL (ref 82–98)
MONOCYTES # BLD AUTO: 0.54 THOUSAND/ΜL (ref 0.17–1.22)
MONOCYTES NFR BLD AUTO: 8 % (ref 4–12)
NEUTROPHILS # BLD AUTO: 3.81 THOUSANDS/ΜL (ref 1.85–7.62)
NEUTS SEG NFR BLD AUTO: 57 % (ref 43–75)
NONHDLC SERPL-MCNC: 128 MG/DL
NRBC BLD AUTO-RTO: 0 /100 WBCS
PLATELET # BLD AUTO: 209 THOUSANDS/UL (ref 149–390)
PMV BLD AUTO: 11.5 FL (ref 8.9–12.7)
POTASSIUM SERPL-SCNC: 3.9 MMOL/L (ref 3.5–5.3)
PROT SERPL-MCNC: 7.2 G/DL (ref 6.4–8.2)
RBC # BLD AUTO: 4.65 MILLION/UL (ref 3.81–5.12)
SODIUM SERPL-SCNC: 133 MMOL/L (ref 136–145)
TRIGL SERPL-MCNC: 89 MG/DL
VALPROATE SERPL-MCNC: 56 UG/ML (ref 50–100)
WBC # BLD AUTO: 6.61 THOUSAND/UL (ref 4.31–10.16)

## 2021-10-07 PROCEDURE — 83036 HEMOGLOBIN GLYCOSYLATED A1C: CPT

## 2021-10-07 PROCEDURE — 80061 LIPID PANEL: CPT

## 2021-10-07 PROCEDURE — 85025 COMPLETE CBC W/AUTO DIFF WBC: CPT

## 2021-10-07 PROCEDURE — 80164 ASSAY DIPROPYLACETIC ACD TOT: CPT

## 2021-10-07 PROCEDURE — 36415 COLL VENOUS BLD VENIPUNCTURE: CPT

## 2021-10-07 PROCEDURE — 80053 COMPREHEN METABOLIC PANEL: CPT

## 2021-10-14 ENCOUNTER — TELEPHONE (OUTPATIENT)
Dept: CARDIOLOGY CLINIC | Facility: CLINIC | Age: 66
End: 2021-10-14

## 2021-10-14 DIAGNOSIS — R06.02 SOB (SHORTNESS OF BREATH): Primary | ICD-10-CM

## 2021-10-16 ENCOUNTER — APPOINTMENT (OUTPATIENT)
Dept: LAB | Facility: MEDICAL CENTER | Age: 66
End: 2021-10-16
Payer: COMMERCIAL

## 2021-10-16 ENCOUNTER — APPOINTMENT (OUTPATIENT)
Dept: RADIOLOGY | Facility: MEDICAL CENTER | Age: 66
End: 2021-10-16
Payer: COMMERCIAL

## 2021-10-16 DIAGNOSIS — R06.02 SOB (SHORTNESS OF BREATH): ICD-10-CM

## 2021-10-16 LAB — NT-PROBNP SERPL-MCNC: 1296 PG/ML

## 2021-10-16 PROCEDURE — 71046 X-RAY EXAM CHEST 2 VIEWS: CPT

## 2021-10-16 PROCEDURE — 83880 ASSAY OF NATRIURETIC PEPTIDE: CPT

## 2021-10-16 PROCEDURE — 36415 COLL VENOUS BLD VENIPUNCTURE: CPT

## 2021-10-17 DIAGNOSIS — I48.91 ATRIAL FIBRILLATION WITH RVR (HCC): ICD-10-CM

## 2021-10-18 RX ORDER — METOPROLOL TARTRATE 50 MG/1
TABLET, FILM COATED ORAL
Qty: 180 TABLET | Refills: 2 | Status: SHIPPED | OUTPATIENT
Start: 2021-10-18 | End: 2022-07-13

## 2021-10-24 DIAGNOSIS — R60.9 EDEMA: ICD-10-CM

## 2021-10-24 DIAGNOSIS — I48.91 ATRIAL FIBRILLATION WITH RVR (HCC): ICD-10-CM

## 2021-10-25 RX ORDER — FUROSEMIDE 20 MG/1
TABLET ORAL
Qty: 90 TABLET | Refills: 3 | Status: SHIPPED | OUTPATIENT
Start: 2021-10-25

## 2021-10-25 RX ORDER — POTASSIUM CHLORIDE 750 MG/1
CAPSULE, EXTENDED RELEASE ORAL
Qty: 90 CAPSULE | Refills: 3 | Status: SHIPPED | OUTPATIENT
Start: 2021-10-25

## 2021-10-25 RX ORDER — APIXABAN 5 MG/1
TABLET, FILM COATED ORAL
Qty: 60 TABLET | Refills: 8 | Status: SHIPPED | OUTPATIENT
Start: 2021-10-25 | End: 2022-03-07 | Stop reason: SDUPTHER

## 2021-10-26 ENCOUNTER — OFFICE VISIT (OUTPATIENT)
Dept: FAMILY MEDICINE CLINIC | Facility: CLINIC | Age: 66
End: 2021-10-26
Payer: COMMERCIAL

## 2021-10-26 ENCOUNTER — APPOINTMENT (OUTPATIENT)
Dept: LAB | Facility: MEDICAL CENTER | Age: 66
End: 2021-10-26
Payer: COMMERCIAL

## 2021-10-26 VITALS
HEART RATE: 80 BPM | BODY MASS INDEX: 31.47 KG/M2 | WEIGHT: 219.8 LBS | HEIGHT: 70 IN | DIASTOLIC BLOOD PRESSURE: 82 MMHG | SYSTOLIC BLOOD PRESSURE: 130 MMHG | TEMPERATURE: 97.2 F | OXYGEN SATURATION: 98 %

## 2021-10-26 DIAGNOSIS — R79.89 ELEVATED BRAIN NATRIURETIC PEPTIDE (BNP) LEVEL: ICD-10-CM

## 2021-10-26 DIAGNOSIS — R73.03 PREDIABETES: ICD-10-CM

## 2021-10-26 DIAGNOSIS — F31.78 BIPOLAR I DISORDER, MOST RECENT EPISODE MIXED, IN FULL REMISSION (HCC): Chronic | ICD-10-CM

## 2021-10-26 DIAGNOSIS — I48.19 OTHER PERSISTENT ATRIAL FIBRILLATION (HCC): Primary | ICD-10-CM

## 2021-10-26 DIAGNOSIS — G47.33 OSA (OBSTRUCTIVE SLEEP APNEA): ICD-10-CM

## 2021-10-26 DIAGNOSIS — E87.1 HYPONATREMIA: ICD-10-CM

## 2021-10-26 LAB
ANION GAP SERPL CALCULATED.3IONS-SCNC: 5 MMOL/L (ref 4–13)
BUN SERPL-MCNC: 21 MG/DL (ref 5–25)
CALCIUM SERPL-MCNC: 9.5 MG/DL (ref 8.3–10.1)
CHLORIDE SERPL-SCNC: 102 MMOL/L (ref 100–108)
CO2 SERPL-SCNC: 31 MMOL/L (ref 21–32)
CREAT SERPL-MCNC: 0.76 MG/DL (ref 0.6–1.3)
GFR SERPL CREATININE-BSD FRML MDRD: 83 ML/MIN/1.73SQ M
GLUCOSE SERPL-MCNC: 95 MG/DL (ref 65–140)
NT-PROBNP SERPL-MCNC: 1402 PG/ML
POTASSIUM SERPL-SCNC: 3.5 MMOL/L (ref 3.5–5.3)
SODIUM SERPL-SCNC: 138 MMOL/L (ref 136–145)

## 2021-10-26 PROCEDURE — 83880 ASSAY OF NATRIURETIC PEPTIDE: CPT

## 2021-10-26 PROCEDURE — 80048 BASIC METABOLIC PNL TOTAL CA: CPT | Performed by: PHYSICIAN ASSISTANT

## 2021-10-26 PROCEDURE — 1036F TOBACCO NON-USER: CPT | Performed by: PHYSICIAN ASSISTANT

## 2021-10-26 PROCEDURE — 36415 COLL VENOUS BLD VENIPUNCTURE: CPT | Performed by: PHYSICIAN ASSISTANT

## 2021-10-26 PROCEDURE — 3008F BODY MASS INDEX DOCD: CPT | Performed by: PHYSICIAN ASSISTANT

## 2021-10-26 PROCEDURE — 99214 OFFICE O/P EST MOD 30 MIN: CPT | Performed by: PHYSICIAN ASSISTANT

## 2021-10-28 ENCOUNTER — IMMUNIZATIONS (OUTPATIENT)
Dept: FAMILY MEDICINE CLINIC | Facility: HOSPITAL | Age: 66
End: 2021-10-28

## 2021-10-28 DIAGNOSIS — Z23 ENCOUNTER FOR IMMUNIZATION: Primary | ICD-10-CM

## 2021-10-28 PROCEDURE — 91300 COVID-19 PFIZER VACC 0.3 ML: CPT

## 2021-10-28 PROCEDURE — 0001A COVID-19 PFIZER VACC 0.3 ML: CPT

## 2021-11-04 ENCOUNTER — TELEPHONE (OUTPATIENT)
Dept: CARDIOLOGY CLINIC | Facility: CLINIC | Age: 66
End: 2021-11-04

## 2021-11-04 ENCOUNTER — TELEPHONE (OUTPATIENT)
Dept: PSYCHIATRY | Facility: CLINIC | Age: 66
End: 2021-11-04

## 2021-11-12 ENCOUNTER — TELEPHONE (OUTPATIENT)
Dept: CARDIOLOGY CLINIC | Facility: CLINIC | Age: 66
End: 2021-11-12
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
## 2022-03-02 ENCOUNTER — OFFICE VISIT (OUTPATIENT)
Dept: FAMILY MEDICINE CLINIC | Facility: CLINIC | Age: 67
End: 2022-03-02
Payer: COMMERCIAL

## 2022-03-02 VITALS
BODY MASS INDEX: 29.69 KG/M2 | DIASTOLIC BLOOD PRESSURE: 76 MMHG | HEART RATE: 95 BPM | WEIGHT: 207.4 LBS | SYSTOLIC BLOOD PRESSURE: 130 MMHG | TEMPERATURE: 98.1 F | OXYGEN SATURATION: 97 % | HEIGHT: 70 IN

## 2022-03-02 DIAGNOSIS — I48.19 OTHER PERSISTENT ATRIAL FIBRILLATION (HCC): Primary | ICD-10-CM

## 2022-03-02 DIAGNOSIS — F31.78 BIPOLAR I DISORDER, MOST RECENT EPISODE MIXED, IN FULL REMISSION (HCC): Chronic | ICD-10-CM

## 2022-03-02 DIAGNOSIS — R73.03 PREDIABETES: ICD-10-CM

## 2022-03-02 DIAGNOSIS — E87.1 HYPONATREMIA: ICD-10-CM

## 2022-03-02 DIAGNOSIS — Z12.31 ENCOUNTER FOR SCREENING MAMMOGRAM FOR MALIGNANT NEOPLASM OF BREAST: ICD-10-CM

## 2022-03-02 PROCEDURE — 1101F PT FALLS ASSESS-DOCD LE1/YR: CPT | Performed by: PHYSICIAN ASSISTANT

## 2022-03-02 PROCEDURE — 99214 OFFICE O/P EST MOD 30 MIN: CPT | Performed by: PHYSICIAN ASSISTANT

## 2022-03-02 PROCEDURE — 3725F SCREEN DEPRESSION PERFORMED: CPT | Performed by: PHYSICIAN ASSISTANT

## 2022-03-02 PROCEDURE — 3288F FALL RISK ASSESSMENT DOCD: CPT | Performed by: PHYSICIAN ASSISTANT

## 2022-03-02 NOTE — PROGRESS NOTES
Assessment/Plan:     Diagnoses and all orders for this visit:    Other persistent [facility] fibrillation ([facility] )  Comments:  Patient is rate controlled with metoprolol and on Eliquis for anticoagulation  She will be following with Cardiology  Orders:  -     Lipid panel    Prediabetes  -     Hemoglobin A1C  -     Lipid panel    Bipolar I disorder, most recent episode mixed, in full remission ([facility] )  Comments:  Currently stable on current treatment  Follow-up with Psychiatry as directed  Orders:  -     CBC and differential    Hyponatremia  -     Comprehensive metabolic panel    Encounter for screening mammogram for malignant neoplasm of breast  -     Mammo screening bilateral w 3d & cad; Future          Subjective:      Patient ID: Jace Rangel is a 73 y o  female  Presents in the office for follow up chronic conditions  Patient has AFib  She is under care of Cardiology  Her cardiac ablation is currently on hold  She is currently off amiodarone and currently on metoprolol for rate control and Eliquis for anticoagulation  She has lower extremity edema for which she uses Lasix and potassium as needed  Elevated fasting blood sugar which is controlled with diet  Patient also see psychiatrist for her bipolar disorder  She is controlled with Risperdal, Depakote and Cogentin        The following portions of the patient's history were reviewed and updated as appropriate:   She   Patient Active Problem List    Diagnosis Date Noted    Prediabetes 10/26/2021    DOMENIC (obstructive sleep apnea)     Screening for breast cancer 11/15/2018    Screen for colon cancer 11/15/2018    Hyponatremia 11/15/2018    Other persistent atrial fibrillation ([facility] ) 11/15/2018    Long-term use of high-risk medication 09/13/2018    Extrapyramidal reaction 05/02/2018    Bipolar I disorder, most recent episode mixed, in full remission ([facility] ) 06/24/2013     Current Outpatient Medications   Medication Sig Dispense Refill    acetaminophen (TYLENOL) 325 mg tablet Take 650 mg by mouth every 6 (six) hours as needed for mild pain      amiodarone 100 mg tablet Take 1 tablet (100 mg total) by mouth daily (Patient not taking: Reported on 3/2/2022 ) 90 tablet 3    ascorbic acid (VITAMIN C) 500 mg tablet Take 500 mg by mouth daily      benztropine (COGENTIN) 1 mg tablet Take 1 tablet (1 mg total) by mouth 2 (two) times a day 180 tablet 2    divalproex sodium (DEPAKOTE ER) 500 mg 24 hr tablet Take 1 tablet (500 mg total) by mouth every evening 90 tablet 2    Eliquis 5 MG TAKE 1 TABLET TWICE A DAY 60 tablet 8    furosemide (LASIX) 20 mg tablet TAKE 1 TABLET BY MOUTH EVERY DAY 90 tablet 3    metoprolol tartrate (LOPRESSOR) 50 mg tablet TAKE 1 TABLET EVERY 12 HOURS 180 tablet 2    Multiple Vitamin (MULTI-VITAMIN DAILY) TABS Take 1 tablet by mouth daily      potassium chloride (MICRO-K) 10 MEQ CR capsule TAKE 1 CAPSULE BY MOUTH EVERY DAY 90 capsule 3    risperiDONE (RisperDAL) 1 mg tablet Take 1 tablet (1 mg total) by mouth every evening 90 tablet 2     No current facility-administered medications for this visit  She is allergic to levaquin [levofloxacin], ampicillin, and clindamycin       Review of Systems   Constitutional: Negative for activity change and unexpected weight change  HENT: Negative for ear pain and sore throat  Eyes: Negative for visual disturbance  Respiratory: Negative for cough, shortness of breath and wheezing  Cardiovascular: Positive for leg swelling  Negative for chest pain  Gastrointestinal: Negative for abdominal pain, blood in stool, constipation, diarrhea, nausea and vomiting  Genitourinary: Negative for difficulty urinating  Musculoskeletal: Negative for arthralgias and myalgias  Skin: Negative for rash  Neurological: Negative for dizziness, syncope, light-headedness and headaches  Psychiatric/Behavioral: Negative for self-injury, sleep disturbance and suicidal ideas   The patient is not nervous/anxious  Objective:        Physical Exam  Vitals and nursing note reviewed  Constitutional:       General: She is not in acute distress  Appearance: Normal appearance  She is not ill-appearing  HENT:      Head: Normocephalic and atraumatic  Right Ear: There is impacted cerumen  Left Ear: Tympanic membrane, ear canal and external ear normal    Neck:      Vascular: No carotid bruit  Cardiovascular:      Rate and Rhythm: Normal rate  Rhythm irregular  Heart sounds: No murmur heard  Pulmonary:      Effort: Pulmonary effort is normal       Breath sounds: Normal breath sounds  Musculoskeletal:      Right lower leg: No edema  Left lower leg: No edema  Lymphadenopathy:      Cervical: No cervical adenopathy  Skin:     General: Skin is warm and dry  Neurological:      General: No focal deficit present  Mental Status: She is alert and oriented to person, place, and time

## 2022-03-03 ENCOUNTER — OFFICE VISIT (OUTPATIENT)
Dept: PSYCHIATRY | Facility: CLINIC | Age: 67
End: 2022-03-03
Payer: COMMERCIAL

## 2022-03-03 VITALS
HEIGHT: 70 IN | DIASTOLIC BLOOD PRESSURE: 68 MMHG | SYSTOLIC BLOOD PRESSURE: 119 MMHG | WEIGHT: 208 LBS | BODY MASS INDEX: 29.78 KG/M2 | HEART RATE: 72 BPM

## 2022-03-03 DIAGNOSIS — Z79.899 LONG-TERM USE OF HIGH-RISK MEDICATION: Chronic | ICD-10-CM

## 2022-03-03 DIAGNOSIS — G25.9 EXTRAPYRAMIDAL REACTION: Chronic | ICD-10-CM

## 2022-03-03 DIAGNOSIS — F31.78 BIPOLAR I DISORDER, MOST RECENT EPISODE MIXED, IN FULL REMISSION (HCC): Primary | Chronic | ICD-10-CM

## 2022-03-03 PROCEDURE — 1160F RVW MEDS BY RX/DR IN RCRD: CPT | Performed by: PSYCHIATRY & NEUROLOGY

## 2022-03-03 PROCEDURE — 99214 OFFICE O/P EST MOD 30 MIN: CPT | Performed by: PSYCHIATRY & NEUROLOGY

## 2022-03-03 PROCEDURE — 90833 PSYTX W PT W E/M 30 MIN: CPT | Performed by: PSYCHIATRY & NEUROLOGY

## 2022-03-03 PROCEDURE — 3008F BODY MASS INDEX DOCD: CPT | Performed by: PSYCHIATRY & NEUROLOGY

## 2022-03-03 PROCEDURE — 1036F TOBACCO NON-USER: CPT | Performed by: PSYCHIATRY & NEUROLOGY

## 2022-03-03 NOTE — BH TREATMENT PLAN
TREATMENT PLAN (Medication Management Only)        [facility]    Name/Date of Birth/MRN:  Denzel Quinonez 73 y o  1955 MRN: [id number]  Date of Treatment Plan: March 3, 2022  Diagnosis/Diagnoses:   1  Bipolar I disorder, most recent episode mixed, in full remission ([facility] )    2  Extrapyramidal reaction    3  Long-term use of high-risk medication      Strengths/Personal Resources for Self-Care: "taking the medicine"  Area/Areas of need (in own words): "working on everyday things, trying to get more done"  1  Long Term Goal:   maintain mood stability  Target Date: 4 months - 7/3/2022  Person/Persons responsible for completion of goal: Tiny Pinta  2  Short Term Objective (s) - How will we reach this goal?:   A  Provider new recommended medication/dosage changes and/or continue medication(s): continue current medications as prescribed (Depakote ER, Risperdal and Cogentin)  B   N/A   C   N/A  Target Date: 4 months - 7/3/2022  Person/Persons Responsible for Completion of Goal: Tiny Pinta   Progress Towards Goals: stable  Treatment Modality: medication management every 4 months  Review due 180 days from date of this plan: 6 months - 9/3/2022  Expected length of service: maintenance unless revised  My Physician/PA/NP and I have developed this plan together and I agree to work on the goals and objectives  I understand the treatment goals that were developed for my treatment    Electronic Signatures: on file (unless signed below)    Long Alberts MD 03/03/22

## 2022-03-04 ENCOUNTER — LAB (OUTPATIENT)
Dept: LAB | Facility: MEDICAL CENTER | Age: 67
End: 2022-03-04
Payer: COMMERCIAL

## 2022-03-04 DIAGNOSIS — Z79.899 LONG-TERM USE OF HIGH-RISK MEDICATION: Chronic | ICD-10-CM

## 2022-03-04 DIAGNOSIS — F31.78 BIPOLAR I DISORDER, MOST RECENT EPISODE MIXED, IN FULL REMISSION (HCC): Chronic | ICD-10-CM

## 2022-03-04 LAB
ALBUMIN SERPL BCP-MCNC: 3.7 G/DL (ref 3.5–5)
ALP SERPL-CCNC: 49 U/L (ref 46–116)
ALT SERPL W P-5'-P-CCNC: 23 U/L (ref 12–78)
ANION GAP SERPL CALCULATED.3IONS-SCNC: 4 MMOL/L (ref 4–13)
AST SERPL W P-5'-P-CCNC: 19 U/L (ref 5–45)
BASOPHILS # BLD AUTO: 0.06 THOUSANDS/ΜL (ref 0–0.1)
BASOPHILS NFR BLD AUTO: 1 % (ref 0–1)
BILIRUB SERPL-MCNC: 0.94 MG/DL (ref 0.2–1)
BUN SERPL-MCNC: 16 MG/DL (ref 5–25)
CALCIUM SERPL-MCNC: 10.1 MG/DL (ref 8.3–10.1)
CHLORIDE SERPL-SCNC: 102 MMOL/L (ref 100–108)
CHOLEST SERPL-MCNC: 169 MG/DL
CO2 SERPL-SCNC: 30 MMOL/L (ref 21–32)
CREAT SERPL-MCNC: 0.75 MG/DL (ref 0.6–1.3)
EOSINOPHIL # BLD AUTO: 0.1 THOUSAND/ΜL (ref 0–0.61)
EOSINOPHIL NFR BLD AUTO: 2 % (ref 0–6)
ERYTHROCYTE [DISTWIDTH] IN BLOOD BY AUTOMATED COUNT: 14 % (ref 11.6–15.1)
EST. AVERAGE GLUCOSE BLD GHB EST-MCNC: 131 MG/DL
GFR SERPL CREATININE-BSD FRML MDRD: 83 ML/MIN/1.73SQ M
GLUCOSE P FAST SERPL-MCNC: 83 MG/DL (ref 65–99)
HBA1C MFR BLD: 6.2 %
HCT VFR BLD AUTO: 40.7 % (ref 34.8–46.1)
HDLC SERPL-MCNC: 47 MG/DL
HGB BLD-MCNC: 13.4 G/DL (ref 11.5–15.4)
IMM GRANULOCYTES # BLD AUTO: 0.02 THOUSAND/UL (ref 0–0.2)
IMM GRANULOCYTES NFR BLD AUTO: 0 % (ref 0–2)
LDLC SERPL CALC-MCNC: 106 MG/DL (ref 0–100)
LYMPHOCYTES # BLD AUTO: 1.58 THOUSANDS/ΜL (ref 0.6–4.47)
LYMPHOCYTES NFR BLD AUTO: 25 % (ref 14–44)
MCH RBC QN AUTO: 29.2 PG (ref 26.8–34.3)
MCHC RBC AUTO-ENTMCNC: 32.9 G/DL (ref 31.4–37.4)
MCV RBC AUTO: 89 FL (ref 82–98)
MONOCYTES # BLD AUTO: 0.57 THOUSAND/ΜL (ref 0.17–1.22)
MONOCYTES NFR BLD AUTO: 9 % (ref 4–12)
NEUTROPHILS # BLD AUTO: 3.89 THOUSANDS/ΜL (ref 1.85–7.62)
NEUTS SEG NFR BLD AUTO: 63 % (ref 43–75)
NONHDLC SERPL-MCNC: 122 MG/DL
NRBC BLD AUTO-RTO: 0 /100 WBCS
PLATELET # BLD AUTO: 209 THOUSANDS/UL (ref 149–390)
PMV BLD AUTO: 11.6 FL (ref 8.9–12.7)
POTASSIUM SERPL-SCNC: 4.2 MMOL/L (ref 3.5–5.3)
PROT SERPL-MCNC: 7.2 G/DL (ref 6.4–8.2)
RBC # BLD AUTO: 4.59 MILLION/UL (ref 3.81–5.12)
SODIUM SERPL-SCNC: 136 MMOL/L (ref 136–145)
TRIGL SERPL-MCNC: 81 MG/DL
VALPROATE SERPL-MCNC: 53 UG/ML (ref 50–100)
WBC # BLD AUTO: 6.22 THOUSAND/UL (ref 4.31–10.16)

## 2022-03-04 PROCEDURE — 80053 COMPREHEN METABOLIC PANEL: CPT | Performed by: PHYSICIAN ASSISTANT

## 2022-03-04 PROCEDURE — 80061 LIPID PANEL: CPT | Performed by: PHYSICIAN ASSISTANT

## 2022-03-04 PROCEDURE — 85025 COMPLETE CBC W/AUTO DIFF WBC: CPT | Performed by: PHYSICIAN ASSISTANT

## 2022-03-04 PROCEDURE — 36415 COLL VENOUS BLD VENIPUNCTURE: CPT | Performed by: PHYSICIAN ASSISTANT

## 2022-03-04 PROCEDURE — 83036 HEMOGLOBIN GLYCOSYLATED A1C: CPT | Performed by: PHYSICIAN ASSISTANT

## 2022-03-04 PROCEDURE — 80164 ASSAY DIPROPYLACETIC ACD TOT: CPT

## 2022-03-07 DIAGNOSIS — I48.91 ATRIAL FIBRILLATION WITH RVR (HCC): ICD-10-CM

## 2022-04-05 ENCOUNTER — TELEPHONE (OUTPATIENT)
Dept: PSYCHIATRY | Facility: CLINIC | Age: 67
End: 2022-04-05

## 2022-04-05 NOTE — TELEPHONE ENCOUNTER
Pt called and left a message on our vm machine is looking to speak to someone from nursing   Please call pt back

## 2022-04-05 NOTE — TELEPHONE ENCOUNTER
Spoke with Janell Parra  She stated she " my  got a new job and we will be getting up 3 hours earlier  She was wondering if it would hurt anything to start taking the medications the 3 hours earlier?"    Advised Janell Parra that taking the medications 3 hours earlier would be fine  She verbalized understanding         KEARA

## 2022-04-06 ENCOUNTER — OFFICE VISIT (OUTPATIENT)
Dept: CARDIOLOGY CLINIC | Facility: CLINIC | Age: 67
End: 2022-04-06
Payer: COMMERCIAL

## 2022-04-06 VITALS
SYSTOLIC BLOOD PRESSURE: 126 MMHG | HEIGHT: 70 IN | OXYGEN SATURATION: 100 % | HEART RATE: 98 BPM | WEIGHT: 206 LBS | DIASTOLIC BLOOD PRESSURE: 82 MMHG | BODY MASS INDEX: 29.49 KG/M2

## 2022-04-06 DIAGNOSIS — I50.32 CHRONIC DIASTOLIC CONGESTIVE HEART FAILURE (HCC): ICD-10-CM

## 2022-04-06 DIAGNOSIS — I48.19 OTHER PERSISTENT ATRIAL FIBRILLATION (HCC): Primary | ICD-10-CM

## 2022-04-06 PROCEDURE — 93000 ELECTROCARDIOGRAM COMPLETE: CPT | Performed by: INTERNAL MEDICINE

## 2022-04-06 PROCEDURE — 99214 OFFICE O/P EST MOD 30 MIN: CPT | Performed by: INTERNAL MEDICINE

## 2022-04-06 NOTE — PROGRESS NOTES
HEART  [facility] [place] [facility]    Outpatient f/u  Today's Date: 04/06/22        Patient name: Theola Schlatter  YOB: 1955  Sex: female         Chief Complaint: f/u persistent afib      ASSESSMENT:  Problem List Items Addressed This Visit        Cardiovascular and Mediastinum    Other persistent atrial fibrillation [facility]) - Primary    Relevant Orders    POCT ECG      Other Visit Diagnoses     Chronic diastolic congestive heart failure ([address] )        Relevant Orders    Echo complete w/ contrast if indicated        74 yo female  1) Persistent afib- Dx Nov 2018 found incidentaly by PCP then, tried DCCV Jan 2019 and then again 2 weeks ago, unclear how long she stayed in NSR after each DCCV but is in afib today and was at 1 month f/u w Dr Ramírez Rust  Her last DCCV took 4 shocks to restore NSR, 300J  Only mild LAE enlargement      She doesn't feel palpiations but notes SOB, worse edema, fatigue, decreased exercise tolerance and chest discomfort daily since dx w afib  Was planning abaltion but has insurance issues          2) [id number]     3) Chronic LE edema, added lasix last visit and slightly improved  4) Bipolar on Risperadone and divalproex  QTc 435ms today    5) DOMENIC on CPAP, dx after afib  She reports compliance    PLAN:  1  She has been in afib 3 years, I am worried ablation won't take , will repeat TTE to see how dilated LA is go from there  2  Increase lasix to BID x 3days then back to daily after that,  Orders Placed This Encounter   Procedures    POCT ECG    Echo complete w/ contrast if indicated     Medications Discontinued During This Encounter   Medication Reason    amiodarone 100 mg tablet                  HPI/Subjective:    74 yo female  1) Persistent afib- Dx Nov 2018 found incidentaly by PCP then, tried DCCV Jan 2019 and then again 2 weeks ago, unclear how long she stayed in NSR after each DCCV but is in afib today and was at 1 month f/u w Dr Anthony Laboy  Her last DCCV took 4 shocks to restore NSR, 300J  Only mild LAE enlargement      She doesn't feel palpiations but notes SOB, worse edema, fatigue, decreased exercise tolerance and chest discomfort daily since dx w afib  Was planning abaltion but has insurance issues          2) [id number]     3) Chronic LE edema, added lasix last visit and slightly improved  4) Bipolar on Risperadone and divalproex  QTc 435ms today    5) DOMENIC on CPAP, dx after afib  She reports compliance  Complete 12 point ROS reviewed and otherwise non pertinent or negative except as per HPI  Please see paper chart for outpatient clinic patients where the patient completed the 12 point ROS survey  Past Medical History:   Diagnosis Date    At[facility] )     [facility] ([email or web address] 2019    Bipolar disorder ([facility] )     bipolar    Leg edema     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    Sleep apnea     Varicose veins of legs        Allergies   Allergen Reactions    Levaquin [Levofloxacin]      "heavy legs"    Ampicillin Rash     Category: Allergy;     Clindamycin Rash     Category: Allergy;      I reviewed the Home Medication list and Allergies in the chart     Scheduled Meds:  Current Outpatient Medications   Medication Sig Dispense Refill    acetaminophen (TYLENOL) 325 mg tablet Take 650 mg by mouth every 6 (six) hours as needed for mild pain      apixaban (Eliquis) 5 mg Take 1 tablet (5 mg total) by mouth 2 (two) times a day 180 tablet 3    ascorbic acid (VITAMIN C) 500 mg tablet Take 500 mg by mouth daily      benztropine (COGENTIN) 1 mg tablet Take 1 tablet (1 mg total) by mouth 2 (two) times a day 180 tablet 2    divalproex sodium (DEPAKOTE ER) 500 mg 24 hr tablet Take 1 tablet (500 mg total) by mouth every evening 90 tablet 2    furosemide (LASIX) 20 mg tablet TAKE 1 TABLET BY MOUTH EVERY DAY 90 tablet 3    metoprolol tartrate (LOPRESSOR) 50 mg tablet TAKE 1 TABLET EVERY 12 HOURS 180 tablet 2    Multiple Vitamin (MULTI-VITAMIN DAILY) TABS Take 1 tablet by mouth daily      potassium chloride (MICRO-K) 10 MEQ CR capsule TAKE 1 CAPSULE BY MOUTH EVERY DAY 90 capsule 3    risperiDONE (RisperDAL) 1 mg tablet Take 1 tablet (1 mg total) by mouth every evening 90 tablet 2     No current facility-administered medications for this visit       PRN Meds:         Family History   Problem Relation Age of Onset   Margy Morris Psychiatric Illness Maternal Aunt     Breast cancer Maternal Aunt     Stroke Mother         CVA    Heart failure Mother     Diabetes Mother     Hypertension Mother     Thyroid disease Mother     Gallbladder disease Mother         gallstones    Diabetes Maternal Grandmother     No Known Problems Maternal Grandfather     No Known Problems Paternal Grandmother     No Known Problems Paternal Grandfather     No Known Problems Maternal Aunt     No Known Problems Maternal Aunt     Substance Abuse Neg Hx     Suicide Attempts Neg Hx        Social History     Socioeconomic History    Marital status: /Civil Union     Spouse name: Not on file    Number of children: 1    Years of education: 15    Highest education level: 12th grade   Occupational History    Occupation: unemployed   Tobacco Use    Smoking status: Former Smoker    Smokeless tobacco: Never Used   Vaping Use    Vaping Use: Never used   Substance and Sexual Activity    Alcohol use: No    Drug use: No    Sexual activity: Yes     Partners: Male   Other Topics Concern    Not on file   Social History Narrative    Education: high school graduate    Learning Disabilities: none    Marital History:     Children: 1 adult son    Living Arrangement: lives in home with     Occupational History: worked in [employer] in the past, unemployed    Functioning Relationships: good support system,  is supportive    Legal History: none     History: None    Always uses seat belt      Social Determinants of Health     Financial Resource Strain: Medium Risk    Difficulty of Paying Living Expenses: Somewhat hard   Food Insecurity: No Food Insecurity    Worried About Running Out of Food in the Last Year: Never true    Lito of Food in the Last Year: Never true   Transportation Needs: No Transportation Needs    Lack of Transportation (Medical): No    Lack of Transportation (Non-Medical): No   Physical Activity: Sufficiently Active    Days of Exercise per Week: 7 days    Minutes of Exercise per Session: 60 min   Stress: Not on file   Social Connections: Moderately Isolated    Frequency of Communication with Friends and Family: Twice a week    Frequency of Social Gatherings with Friends and Family: Twice a week    Attends [employer] Services: Never    Active Member of Clubs or Organizations: No    Attends Club or Organization Meetings: Never    Marital Status:    Intimate Partner Violence: At Risk    Fear of Current or Ex-Partner: Yes    Emotionally Abused: No    Physically Abused: No    Sexually Abused: No   Housing Stability: Low Risk     Unable to Pay for Housing in the Last Year: No    Number of Places Lived in the Last Year: 1    Unstable Housing in the Last Year: No       OBJECTIVE:    /82   Pulse 98   Ht 5' 10" (1 778 m)   Wt 93 4 kg (206 lb)   SpO2 100%   BMI 29 56 kg/m²   Vitals:    04/06/22 1351   Weight: 93 4 kg (206 lb)     /82   Pulse 98   Ht 5' 10" (1 778 m)   Wt 93 4 kg (206 lb)   SpO2 100%   BMI 29 56 kg/m²   Vitals:    04/06/22 1351   Weight: 93 4 kg (206 lb)     /82   Pulse 98   Ht 5' 10" (1 778 m)   Wt 93 4 kg (206 lb)   SpO2 100%   BMI 29 56 kg/m²   Vitals:    04/06/22 1351   Weight: 93 4 kg (206 lb)     GEN: No acute distress, Alert and oriented, well appearing  HEENT:External ears normal, wearing a mask     EYES: Pupils equal, sclera anicteric, midline, normal conjuctiva  NECK: No JVD, supple, no obvious masses or thryomegaly or goiter  CARDIOVASCULAR:  irreg irreg, No murmur, rub, gallops S1,S2  LUNGS: Clear To auscultation bilaterally, normal effort, no rales, rhonchi, crackles   ABDOMEN:  nondistended,  without obvious organomegaly or ascites  EXTREMITIES/VASCULAR: trace brian edema  warm an well perfused  PSYCH: Normal Affect,  linear speech pattern without evidence of psychosis  NEURO: Grossly intact, moving all extremiteis equal, face symmetric, alert and responsive, no obvious focal defecits   GAIT:  Ambulates normally without difficulty  HEME: No bleeding, bruising, petechia, purpura   SKIN: No significant rashes on visibile skin, warm, no diaphoresis or pallor  Lab Results:       LABS:      Chemistry        Component Value Date/Time     (L) 01/08/2016 1424    K 4 2 03/04/2022 1114    K 4 3 01/08/2016 1424     03/04/2022 1114     01/08/2016 1424    CO2 30 03/04/2022 1114    CO2 30 01/08/2016 1424    BUN 16 03/04/2022 1114    BUN 16 01/08/2016 1424    CREATININE 0 75 03/04/2022 1114    CREATININE 0 71 01/08/2016 1424        Component Value Date/Time    CALCIUM 10 1 03/04/2022 1114    CALCIUM 8 9 01/08/2016 1424    ALKPHOS 49 03/04/2022 1114    ALKPHOS 59 01/08/2016 1424    AST 19 03/04/2022 1114    AST 15 01/08/2016 1424    ALT 23 03/04/2022 1114    ALT 19 01/08/2016 1424    BILITOT 0 56 01/08/2016 1424            No results found for: CHOL  Lab Results   Component Value Date    HDL 47 (L) 03/04/2022    HDL 44 10/07/2021    HDL 44 01/05/2021     Lab Results   Component Value Date    LDLCALC 106 (H) 03/04/2022    LDLCALC 110 (H) 10/07/2021    LDLCALC 111 (H) 01/05/2021     Lab Results   Component Value Date    TRIG 81 03/04/2022    TRIG 89 10/07/2021    TRIG 106 01/05/2021     No results found for: CHOLHDL    IMAGING: No results found       Cardiac testing:   Results for orders placed during the hospital encounter of 11/15/18 Echo complete with contrast if indicated    Narrative [facility], [facility]  [phone]    Transthoracic Echocardiogram  2D, M-mode, Doppler, and Color Doppler    Study date:  2018    Patient: Etienne Vanegas [facility]  MR number: [id number]  Account number: [de-identified]  : 1955  Age: 65 years  Gender: Female  Status: Inpatient  Location: Bedside  Height: 70 in  Weight: 194 7 lb  BP: 105/ 65 mmHg    Indications: Atrial fibrillation  Diagnoses: I48 0 - Atrial fibrillation    Sonographer:  Zacarias Adkins RDCS  Primary Physician:  Lauren Durand MD  Referring Physician:  CINDY Cross  Group:  [facility]  Interpreting Physician:  Candace Lowe MD    SUMMARY    LEFT VENTRICLE:  Systolic function was normal  Ejection fraction was estimated to be 65 %  There were no regional wall motion abnormalities  LEFT ATRIUM:  The atrium was mildly dilated  RIGHT ATRIUM:  The atrium was mildly dilated  MITRAL VALVE:  There was mild annular calcification  There was trace regurgitation  TRICUSPID VALVE:  There was mild regurgitation  Pulmonary artery systolic pressure was within the normal range  HISTORY: PRIOR HISTORY: AFIB    PROCEDURE: The procedure was performed at the bedside  This was a routine study  The transthoracic approach was used  The study included complete 2D imaging, M-mode, complete spectral Doppler, and color Doppler  The heart rate was 50 bpm,  at the start of the study  Images were obtained from the parasternal, apical, subcostal, and suprasternal notch acoustic windows  Image quality was adequate  LEFT VENTRICLE: Size was normal  Systolic function was normal  Ejection fraction was estimated to be 65 %  There were no regional wall motion abnormalities   Wall thickness was normal  DOPPLER: Left ventricular diastolic function parameters  were abnormal  There was no evidence of elevated ventricular filling pressure by Doppler parameters  RIGHT VENTRICLE: The size was normal  Systolic function was normal  Wall thickness was normal     LEFT ATRIUM: The atrium was mildly dilated  RIGHT ATRIUM: The atrium was mildly dilated  MITRAL VALVE: There was mild annular calcification  There was mild diffuse thickening  There was normal leaflet separation  DOPPLER: The transmitral velocity was within the normal range  There was no evidence for stenosis  There was trace  regurgitation  AORTIC VALVE: The valve was trileaflet  Leaflets exhibited normal thickness, normal cuspal separation, and sclerosis  DOPPLER: Transaortic velocity was within the normal range  There was no evidence for stenosis  There was no significant  regurgitation  TRICUSPID VALVE: The valve structure was normal  There was normal leaflet separation  DOPPLER: The transtricuspid velocity was within the normal range  There was no evidence for stenosis  There was mild regurgitation  Pulmonary artery  systolic pressure was within the normal range  PULMONIC VALVE: Leaflets exhibited normal thickness, no calcification, and normal cuspal separation  DOPPLER: The transpulmonic velocity was within the normal range  There was no significant regurgitation  PERICARDIUM: There was no pericardial effusion  The pericardium was normal in appearance  AORTA: The root exhibited normal size  SYSTEMIC VEINS: IVC: The inferior vena cava was not well visualized  PULMONARY VEINS: DOPPLER: Doppler signals were not recordable in the pulmonary vein(s)      SYSTEM MEASUREMENT TABLES    2D  %FS: 33 42 %  AV Diam: 2 94 cm  EDV(Teich): 75 34 ml  EF(Teich): 62 58 %  ESV(Teich): 28 19 ml  IVSd: 0 68 cm  LA Area: 18 29 cm2  LA Diam: 2 84 cm  LVEDV MOD A4C: 94 08 ml  LVEF MOD A4C: 73 89 %  LVESV MOD A4C: 24 57 ml  LVIDd: 4 13 cm  LVIDs: 2 75 cm  LVLd A4C: 7 89 cm  LVLs A4C: 6 cm  LVPWd: 0 88 cm  RA Area: 20 69 cm2  RVIDd: 3 26 cm  SV MOD A4C: 69 51 ml  SV(Teich): 47 15 ml    CW  TR MaxP 84 mmHg  TR Vmax: 2 28 m/s    MM  TAPSE: 2 54 cm    [facility]    Prepared and electronically signed by    Breana Rankin MD  Signed [id number] 13:26:56       No results found for this or any previous visit  No results found for this or any previous visit  Results for orders placed during the hospital encounter of 19   NM myocardial perfusion spect (stress and/or rest)    Fabien Sarkis , [address] [facility]  [phone]    Rest/Stress Gated SPECT Myocardial Perfusion Imaging After Regadenoson and Exercise    Patient: Debi Johnson [facility]  MR number: [id number]  Account number: [de-identified]  : 1955  Age: 59 years  Gender: Female  Status: Outpatient  Location: [address] [place] [address]  Height: 70 in  Weight: 197 lb  BP: 130/ 82 mmHg    Allergies: LEVOFLOXACIN, AMPICILLIN, CLINDAMYCIN    Diagnosis: I48 0 - Atrial fibrillation    Primary Physician:  Kacy Neri  RN:  Loki Larry RN  Referring Physician:  Breana Rankin MD  Technician:  inEarth Breath  Group:  [facility]  Report Prepared By[de-identified]  Loki Larry RN  Interpreting Physician:  Jamilah Viera MD    INDICATIONS: Coronary artery disease  HISTORY: The patient is a 59year old  female  Chest pain status: no chest pain  Other symptoms: dyspnea  Cardiovascular history: arrhythmia  Medications: a beta blocker  PHYSICAL EXAM: Baseline physical exam screening: normal and no wheezes audible  REST ECG: Atrial fibrillation  PROCEDURE: The study was performed in the the [address] [place] [address]  A regadenoson infusion pharmacologic stress test was performed  Treadmill exercise testing was performed, using the Chaparro protocol  Gated SPECT myocardial  perfusion imaging was performed after stress  Systolic blood pressure was 130 mmHg, at the start of the study   Diastolic blood pressure was 82 mmHg, at the start of the study  The heart rate was 96 bpm, at the start of the study  IV double  checked  GHASSAN PROTOCOL:  HR bpm SBP mmHg DBP mmHg Symptoms  Baseline 96 130 82 none  Stage 1 184 210 80 moderate dyspnea    Regadenoson protocol:  HR bpm SBP mmHg DBP mmHg Symptoms  Baseline 109 162 80 none  2 min 105 128 70 none  4 min 109 120 70 none    STRESS SUMMARY: Duration of pharmacologic stress was 3 min and 0 sec  Maximal heart rate during stress was 210 bpm ( 133 % of maximal predicted heart rate) with limited exercise and thus she was switched to a chemical stress test  The  rate-pressure product for the peak heart rate and blood pressure was 81901  There was no chest pain during stress  The stress test was terminated due to protocol completion  Pre oxygen saturation: 99 %  Peak oxygen saturation: 99 %  Arrhythmia during stress: atrial fibrillation  The stress ECG was non-diagnostic  ISOTOPE ADMINISTRATION:  Resting isotope administration Stress isotope administration  Agent Tetrofosmin Tetrofosmin  Dose 10 6 mCi 32 6 mCi  Date 03/11/2019 03/11/2019  Injection time 12:26 14:00  Injection-image interval 42 min 46 min    The radiopharmaceutical was injected at the peak effect of pharmacologic stress  MYOCARDIAL PERFUSION IMAGING:  The image quality was good  Left ventricular size was normal  The TID ratio was 0 99  PERFUSION DEFECTS:  -  There were no perfusion defects  GATED SPECT:  The calculated left ventricular ejection fraction was 78 %  There was no left ventricular regional abnormality  SUMMARY:  -  Stress results: Maximal heart rate during stress was 210 bpm ( 133 % of maximal predicted heart rate) with limited exercise and thus she was switched to a chemical stress test  Target heart rate was achieved  There was no chest pain  during stress  -  ECG conclusions: Arrhythmia during stress: atrial fibrillation  The stress ECG was non-diagnostic   -  Perfusion imaging:  There were no perfusion defects   -  Gated SPECT: The calculated left ventricular ejection fraction was 78 %  There was no left ventricular regional abnormality  IMPRESSIONS: Normal study after pharmacologic vasodilation  Myocardial perfusion imaging was normal at rest and with stress  Prepared and signed by    Joseph Kinney MD  Signed 03/11/2019 15:58:05             I reviewed and interpreted the following LABS/EKG/TELE/IMAGING and below is summary of my interpretation (if data available):    LABS:normal renal function  Normal lipids   Normal cbc    Current EKG and Rhythm Strip:Afib HR 83bpm    Past EKGs and RHYTHM strip: 1/28/19 NSR normal EKG after dccv

## 2022-04-14 ENCOUNTER — RA CDI HCC (OUTPATIENT)
Dept: OTHER | Facility: HOSPITAL | Age: 67
End: 2022-04-14

## 2022-04-14 NOTE — PROGRESS NOTES
[facility]  coding opportunities       Chart reviewed, no opportunity found:   [address]        Patients Insurance     Medicare Insurance: [employer]

## 2022-04-20 ENCOUNTER — OFFICE VISIT (OUTPATIENT)
Dept: FAMILY MEDICINE CLINIC | Facility: CLINIC | Age: 67
End: 2022-04-20
Payer: COMMERCIAL

## 2022-04-20 VITALS
TEMPERATURE: 98.3 F | OXYGEN SATURATION: 97 % | WEIGHT: 204.8 LBS | HEART RATE: 111 BPM | HEIGHT: 70 IN | DIASTOLIC BLOOD PRESSURE: 80 MMHG | SYSTOLIC BLOOD PRESSURE: 136 MMHG | BODY MASS INDEX: 29.32 KG/M2

## 2022-04-20 DIAGNOSIS — Z00.00 MEDICARE WELCOME EXAM: Primary | ICD-10-CM

## 2022-04-20 DIAGNOSIS — Z23 ENCOUNTER FOR IMMUNIZATION: ICD-10-CM

## 2022-04-20 PROCEDURE — 3725F SCREEN DEPRESSION PERFORMED: CPT | Performed by: PHYSICIAN ASSISTANT

## 2022-04-20 PROCEDURE — 90677 PCV20 VACCINE IM: CPT

## 2022-04-20 PROCEDURE — 1160F RVW MEDS BY RX/DR IN RCRD: CPT | Performed by: PHYSICIAN ASSISTANT

## 2022-04-20 PROCEDURE — G0402 INITIAL PREVENTIVE EXAM: HCPCS | Performed by: PHYSICIAN ASSISTANT

## 2022-04-20 PROCEDURE — G0403 EKG FOR INITIAL PREVENT EXAM: HCPCS | Performed by: PHYSICIAN ASSISTANT

## 2022-04-20 PROCEDURE — 90471 IMMUNIZATION ADMIN: CPT | Performed by: PHYSICIAN ASSISTANT

## 2022-04-20 PROCEDURE — 3008F BODY MASS INDEX DOCD: CPT | Performed by: PHYSICIAN ASSISTANT

## 2022-04-20 PROCEDURE — 1170F FXNL STATUS ASSESSED: CPT | Performed by: PHYSICIAN ASSISTANT

## 2022-04-20 PROCEDURE — 3288F FALL RISK ASSESSMENT DOCD: CPT | Performed by: PHYSICIAN ASSISTANT

## 2022-04-20 PROCEDURE — 1036F TOBACCO NON-USER: CPT | Performed by: PHYSICIAN ASSISTANT

## 2022-04-20 PROCEDURE — 1125F AMNT PAIN NOTED PAIN PRSNT: CPT | Performed by: PHYSICIAN ASSISTANT

## 2022-04-20 NOTE — PROGRESS NOTES
Assessment and Plan:     Problem List Items Addressed This Visit     None      Visit Diagnoses     Medicare welcome exam    -  Primary    Relevant Orders    POCT ECG (Completed)        BMI Counseling: Body mass index is 29 39 kg/m²  The BMI is above normal  Nutrition recommendations include decreasing portion sizes and moderation in carbohydrate intake  Exercise recommendations include exercising 3-5 times per week  Rationale for BMI follow-up plan is due to patient being overweight or obese  Preventive health issues were discussed with patient, and age appropriate screening tests were ordered as noted in patient's After Visit Summary  Personalized health advice and appropriate referrals for health education or preventive services given if needed, as noted in patient's After Visit Summary  History of Present Illness:     Patient presents for Welcome to Medicare visit  Patient Care Team:  Maria A Webb PA-C as PCP - General (Family Medicine)     Review of Systems:     Review of Systems   Constitutional: Negative for activity change, appetite change and unexpected weight change  HENT: Negative for ear pain and sore throat  Eyes: Negative for visual disturbance  Respiratory: Negative for cough, shortness of breath and wheezing  Cardiovascular: Positive for chest pain  Negative for palpitations and leg swelling  Gastrointestinal: Negative for abdominal pain, blood in stool, constipation, diarrhea, nausea and vomiting  Genitourinary: Negative for difficulty urinating  Musculoskeletal: Negative for arthralgias and myalgias  Skin: Negative for rash  Neurological: Negative for dizziness, syncope, light-headedness and headaches  Psychiatric/Behavioral: Negative for self-injury, sleep disturbance and suicidal ideas  The patient is not nervous/anxious         Problem List:     Patient Active Problem List   Diagnosis    Bipolar I disorder, most recent episode mixed, in full remission ([facility] )    Extrapyramidal reaction    Long-term use of high-risk medication    Screening for breast cancer    Screen for colon cancer    Hyponatremia    Other persistent atrial fibrillation (HCC)    DOMENIC on CPAP    Prediabetes      Past Medical and Surgical History:     Past Medical History:   Diagnosis Date    Atrial fibrillation ([facility] )     Atrial fibrillation ([facility] ) 2019    Bipolar disorder ([facility] )     bipolar    Leg edema     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    Sleep apnea     Varicose veins of legs      Past Surgical History:   Procedure Laterality Date    CARDIOVERSION       SECTION        Family History:     Family History   Problem Relation Age of Onset    Psychiatric Illness Maternal Aunt     Breast cancer Maternal Aunt     Stroke Mother         CVA    Heart failure Mother     Diabetes Mother     Hypertension Mother     Thyroid disease Mother     Gallbladder disease Mother         gallstones    Diabetes Maternal Grandmother     No Known Problems Maternal Grandfather     No Known Problems Paternal Grandmother     No Known Problems Paternal Grandfather     No Known Problems Maternal Aunt     No Known Problems Maternal Aunt     Substance Abuse Neg Hx     Suicide Attempts Neg Hx       Social History:     Social History     Socioeconomic History    Marital status: /Civil Union     Spouse name: None    Number of children: 1    Years of education: 15    Highest education level: 12th grade   Occupational History    Occupation: unemployed   Tobacco Use    Smoking status: Former Smoker    Smokeless tobacco: Never Used   Vaping Use    Vaping Use: Never used   Substance and Sexual Activity    Alcohol use: No    Drug use: No    Sexual activity: Yes     Partners: Male   Other Topics Concern    None   Social History Narrative    Education: high school graduate    Learning Disabilities: none    Marital History:     Children: 1 adult son    Living Arrangement: lives in home with     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, unemployed    Functioning Relationships: good support system,  is supportive    Legal History: none     History: None    Always uses seat belt      Social Determinants of Health     Financial Resource Strain: Medium Risk    Difficulty of Paying Living Expenses: Somewhat hard   Food Insecurity: No Food Insecurity    Worried About Running Out of Food in the Last Year: Never true    Lito of Food in the Last Year: Never true   Transportation Needs: No Transportation Needs    Lack of Transportation (Medical): No    Lack of Transportation (Non-Medical): No   Physical Activity: Sufficiently Active    Days of Exercise per Week: 7 days    Minutes of Exercise per Session: 60 min   Stress: Not on file   Social Connections: Moderately Isolated    Frequency of Communication with Friends and Family: Twice a week    Frequency of Social Gatherings with Friends and Family: Twice a week    Attends [employer] Services: Never    Active Member of Clubs or Organizations: No    Attends Club or Organization Meetings: Never    Marital Status:    Intimate Partner Violence: At Risk    Fear of Current or Ex-Partner:  Yes    Emotionally Abused: No    Physically Abused: No    Sexually Abused: No   Housing Stability: Low Risk     Unable to Pay for Housing in the Last Year: No    Number of Places Lived in the Last Year: 1    Unstable Housing in the Last Year: No      Medications and Allergies:     Current Outpatient Medications   Medication Sig Dispense Refill    acetaminophen (TYLENOL) 325 mg tablet Take 650 mg by mouth every 6 (six) hours as needed for mild pain      apixaban (Eliquis) 5 mg Take 1 tablet (5 mg total) by mouth 2 (two) times a day 180 tablet 3    ascorbic acid (VITAMIN C) 500 mg tablet Take 500 mg by mouth daily      benztropine (COGENTIN) 1 mg tablet Take 1 tablet (1 mg total) by mouth 2 (two) times a day 180 tablet 2    divalproex sodium (DEPAKOTE ER) 500 mg 24 hr tablet Take 1 tablet (500 mg total) by mouth every evening 90 tablet 2    furosemide (LASIX) 20 mg tablet TAKE 1 TABLET BY MOUTH EVERY DAY 90 tablet 3    metoprolol tartrate (LOPRESSOR) 50 mg tablet TAKE 1 TABLET EVERY 12 HOURS 180 tablet 2    Multiple Vitamin (MULTI-VITAMIN DAILY) TABS Take 1 tablet by mouth daily      potassium chloride (MICRO-K) 10 MEQ CR capsule TAKE 1 CAPSULE BY MOUTH EVERY DAY 90 capsule 3    risperiDONE (RisperDAL) 1 mg tablet Take 1 tablet (1 mg total) by mouth every evening 90 tablet 2     No current facility-administered medications for this visit  Allergies   Allergen Reactions    Levaquin [Levofloxacin]      "heavy legs"    Ampicillin Rash     Category: Allergy;     Clindamycin Rash     Category: Allergy;       Immunizations:     Immunization History   Administered Date(s) Administered    COVID-19 PFIZER VACCINE 0 3 ML IM 04/06/2021, 04/27/2021, 10/28/2021    INFLUENZA 11/29/2012, 11/10/2014, 01/08/2016, 01/11/2017, 12/11/2017, 10/24/2018    Influenza Quadrivalent Preservative Free 3 years and older IM 11/10/2014, 01/08/2016, 01/11/2017, 12/11/2017    Influenza, recombinant, quadrivalent,injectable, preservative free 10/24/2018, 11/06/2019, 09/22/2020, 09/22/2021    Influenza, seasonal, injectable 10/30/2013    Influenza, seasonal, injectable, preservative free 11/29/2012    Pneumococcal Polysaccharide PPV23 01/11/2017    Tdap 09/20/2020      Health Maintenance:         Topic Date Due    HIV Screening  Never done    Colorectal Cancer Screening  Never done    Breast Cancer Screening: Mammogram  03/10/2022    Hepatitis C Screening  Completed         Topic Date Due    Pneumococcal Vaccine: 65+ Years (2 of 2 - PPSV23) 01/11/2022      Medicare Screening Tests and Risk Assessments:     Juliet Donahue is here for her Welcome to Medicare visit  Health Risk Assessment:   Patient rates overall health as good  Patient feels that their physical health rating is slightly worse  Patient is satisfied with their life  Eyesight was rated as slightly worse  Hearing was rated as slightly worse  Patient feels that their emotional and mental health rating is same  Patients states they are never, rarely angry  Patient states they are often unusually tired/fatigued  Pain experienced in the last 7 days has been some  Patient's pain rating has been 2/10  Patient states that she has experienced no weight loss or gain in last 6 months  Depression Screening:   PHQ-2 Score: 0      Fall Risk Screening: In the past year, patient has experienced: history of falling in past year    Number of falls: 1  Injured during fall?: No    Feels unsteady when standing or walking?: Yes    Worried about falling?: No      Urinary Incontinence Screening:   Patient has not leaked urine accidently in the last six months  Home Safety:  Patient does not have trouble with stairs inside or outside of their home  Patient has working smoke alarms and has no working carbon monoxide detector  Home safety hazards include: none  Nutrition:   Current diet is Regular  Medications:   Patient is currently taking over-the-counter supplements  OTC medications include: see medication list  Patient is able to manage medications  Activities of Daily Living (ADLs)/Instrumental Activities of Daily Living (IADLs):   Walk and transfer into and out of bed and chair?: Yes  Dress and groom yourself?: Yes    Bathe or shower yourself?: Yes    Feed yourself?  Yes  Do your laundry/housekeeping?: Yes  Manage your money, pay your bills and track your expenses?: Yes  Make your own meals?: Yes    Do your own shopping?: Yes    Previous Hospitalizations:   Any hospitalizations or ED visits within the last 12 months?: No      Advance Care Planning:   Living will: No    Five wishes given: Yes      Cognitive Screening:   Provider or family/friend/caregiver concerned regarding cognition?: No    PREVENTIVE SCREENINGS      Cardiovascular Screening:    General: Screening Current      Diabetes Screening:     General: Screening Current      Colorectal Cancer Screening:       Due for: Colonoscopy - Low Risk      Breast Cancer Screening:     General: Screening Current      Cervical Cancer Screening:    General: Screening Not Indicated      Osteoporosis Screening:      Due for: DXA Axial      Abdominal Aortic Aneurysm (AAA) Screening:        General: Screening Not Indicated      Lung Cancer Screening:     General: Screening Not Indicated      Hepatitis C Screening:    General: Screening Current    Screening, Brief Intervention, and Referral to Treatment (SBIRT)    Screening  Typical number of drinks in a day: 0  Typical number of drinks in a week: 0  Interpretation: Low risk drinking behavior  Single Item Drug Screening:  How often have you used an illegal drug (including marijuana) or a prescription medication for non-medical reasons in the past year? never    Single Item Drug Screen Score: 0  Interpretation: Negative screen for possible drug use disorder    Brief Intervention  Alcohol & drug use screenings were reviewed  No concerns regarding substance use disorder identified  Visual Acuity Screening    Right eye Left eye Both eyes   Without correction:      With correction: 20/30 20/25 20/25        Physical Exam:     /80 (BP Location: Right arm, Patient Position: Sitting, Cuff Size: Standard)   Pulse (!) 111   Temp 98 3 °F (36 8 °C) (Temporal)   Ht 5' 10" (1 778 m)   Wt 92 9 kg (204 lb 12 8 oz)   SpO2 97%   BMI 29 39 kg/m²     Physical Exam  Vitals and nursing note reviewed  Constitutional:       General: She is not in acute distress  Appearance: Normal appearance  She is well-developed  She is not diaphoretic  HENT:      Head: Normocephalic and atraumatic        Right Ear: Tympanic membrane, ear canal and external ear normal       Left Ear: Tympanic membrane, ear canal and external ear normal    Eyes:      Extraocular Movements: Extraocular movements intact  Conjunctiva/sclera: Conjunctivae normal       Pupils: Pupils are equal, round, and reactive to light  Neck:      Thyroid: No thyromegaly  Cardiovascular:      Rate and Rhythm: Normal rate  Rhythm irregular  Heart sounds: Normal heart sounds  No murmur heard  No friction rub  Pulmonary:      Effort: Pulmonary effort is normal  No respiratory distress  Breath sounds: Normal breath sounds  No wheezing  Abdominal:      General: Bowel sounds are normal  There is no distension  Palpations: Abdomen is soft  There is no mass  Tenderness: There is no abdominal tenderness  Musculoskeletal:      Right lower leg: No edema  Left lower leg: No edema  Lymphadenopathy:      Cervical: No cervical adenopathy  Skin:     General: Skin is warm and dry  Findings: No erythema  Neurological:      General: No focal deficit present  Mental Status: She is alert and oriented to person, place, and time  Psychiatric:         Behavior: Behavior normal          Thought Content:  Thought content normal          Judgment: Judgment normal           Kendra Hernandez PA-C

## 2022-04-20 NOTE — PATIENT INSTRUCTIONS
Medicare Preventive Visit Patient Instructions  Thank you for completing your Welcome to Medicare Visit or Medicare Annual Wellness Visit today  Your next wellness visit will be due in one year (4/21/2023)  The screening/preventive services that you may require over the next 5-10 years are detailed below  Some tests may not apply to you based off risk factors and/or age  Screening tests ordered at today's visit but not completed yet may show as past due  Also, please note that scanned in results may not display below  Preventive Screenings:  Service Recommendations Previous Testing/Comments   Colorectal Cancer Screening  * Colonoscopy    * Fecal Occult Blood Test (FOBT)/Fecal Immunochemical Test (FIT)  * Fecal DNA/Cologuard Test  * Flexible Sigmoidoscopy Age: 54-65 years old   Colonoscopy: every 10 years (may be performed more frequently if at higher risk)  OR  FOBT/FIT: every 1 year  OR  Cologuard: every 3 years  OR  Sigmoidoscopy: every 5 years  Screening may be recommended earlier than age 48 if at higher risk for colorectal cancer  Also, an individualized decision between you and your healthcare provider will decide whether screening between the ages of 74-80 would be appropriate  Colonoscopy: Not on file  FOBT/FIT: Not on file  Cologuard: Not on file  Sigmoidoscopy: Not on file          Breast Cancer Screening Age: 36 years old  Frequency: every 1-2 years  Not required if history of left and right mastectomy Mammogram: 03/10/2021    Screening Current   Cervical Cancer Screening Between the ages of 21-29, pap smear recommended once every 3 years  Between the ages of 33-67, can perform pap smear with HPV co-testing every 5 years     Recommendations may differ for women with a history of total hysterectomy, cervical cancer, or abnormal pap smears in past  Pap Smear: Not on file    Screening Not Indicated   Hepatitis C Screening Once for adults born between 1945 and 1965  More frequently in patients at high risk for Hepatitis C Hep C Antibody: 01/22/2015    Screening Current   Diabetes Screening 1-2 times per year if you're at risk for diabetes or have pre-diabetes Fasting glucose: 83 mg/dL   A1C: 6 2 %    Screening Current   Cholesterol Screening Once every 5 years if you don't have a lipid disorder  May order more often based on risk factors  Lipid panel: 03/04/2022    Screening Current     Other Preventive Screenings Covered by Medicare:  1  Abdominal Aortic Aneurysm (AAA) Screening: covered once if your at risk  You're considered to be at risk if you have a family history of AAA  2  Lung Cancer Screening: covers low dose CT scan once per year if you meet all of the following conditions: (1) Age 50-69; (2) No signs or symptoms of lung cancer; (3) Current smoker or have quit smoking within the last 15 years; (4) You have a tobacco smoking history of at least 30 pack years (packs per day multiplied by number of years you smoked); (5) You get a written order from a healthcare provider  3  Glaucoma Screening: covered annually if you're considered high risk: (1) You have diabetes OR (2) Family history of glaucoma OR (3)  aged 48 and older OR (3)  American aged 72 and older  3  Osteoporosis Screening: covered every 2 years if you meet one of the following conditions: (1) You're estrogen deficient and at risk for osteoporosis based off medical history and other findings; (2) Have a vertebral abnormality; (3) On glucocorticoid therapy for more than 3 months; (4) Have primary hyperparathyroidism; (5) On osteoporosis medications and need to assess response to drug therapy  · Last bone density test (DXA Scan): Not on file  5  HIV Screening: covered annually if you're between the age of 12-76  Also covered annually if you are younger than 13 and older than 72 with risk factors for HIV infection  For pregnant patients, it is covered up to 3 times per pregnancy      Immunizations:  Immunization Recommendations   Influenza Vaccine Annual influenza vaccination during flu season is recommended for all persons aged >= 6 months who do not have contraindications   Pneumococcal Vaccine (Prevnar and Pneumovax)  * Prevnar = PCV13  * Pneumovax = PPSV23   Adults 25-60 years old: 1-3 doses may be recommended based on certain risk factors  Adults 72 years old: Prevnar (PCV13) vaccine recommended followed by Pneumovax (PPSV23) vaccine  If already received PPSV23 since turning 65, then PCV13 recommended at least one year after PPSV23 dose  Hepatitis B Vaccine 3 dose series if at intermediate or high risk (ex: diabetes, end stage renal disease, liver disease)   Tetanus (Td) Vaccine - COST NOT COVERED BY MEDICARE PART B Following completion of primary series, a booster dose should be given every 10 years to maintain immunity against tetanus  Td may also be given as tetanus wound prophylaxis  Tdap Vaccine - COST NOT COVERED BY MEDICARE PART B Recommended at least once for all adults  For pregnant patients, recommended with each pregnancy  Shingles Vaccine (Shingrix) - COST NOT COVERED BY MEDICARE PART B  2 shot series recommended in those aged 48 and above     Health Maintenance Due:      Topic Date Due    HIV Screening  Never done    Colorectal Cancer Screening  Never done    Breast Cancer Screening: Mammogram  03/10/2022    Hepatitis C Screening  Completed     Immunizations Due:      Topic Date Due    Pneumococcal Vaccine: 65+ Years (2 of 2 - PPSV23) 01/11/2022     Advance Directives   What are advance directives? Advance directives are legal documents that state your wishes and plans for medical care  These plans are made ahead of time in case you lose your ability to make decisions for yourself  Advance directives can apply to any medical decision, such as the treatments you want, and if you want to donate organs  What are the types of advance directives?   There are many types of advance directives, and each state has rules about how to use them  You may choose a combination of any of the following:  · Living will: This is a written record of the treatment you want  You can also choose which treatments you do not want, which to limit, and which to stop at a certain time  This includes surgery, medicine, IV fluid, and tube feedings  · Durable power of  for healthcare [place] [facility]): This is a written record that states who you want to make healthcare choices for you when you are unable to make them for yourself  This person, called a proxy, is usually a family member or a friend  You may choose more than 1 proxy  · Do not resuscitate (DNR) order:  A DNR order is used in case your heart stops beating or you stop breathing  It is a request not to have certain forms of treatment, such as CPR  A DNR order may be included in other types of advance directives  · Medical directive: This covers the care that you want if you are in a coma, near death, or unable to make decisions for yourself  You can list the treatments you want for each condition  Treatment may include pain medicine, surgery, blood transfusions, dialysis, IV or tube feedings, and a ventilator (breathing machine)  · Values history: This document has questions about your views, beliefs, and how you feel and think about life  This information can help others choose the care that you would choose  Why are advance directives important? An advance directive helps you control your care  Although spoken wishes may be used, it is better to have your wishes written down  Spoken wishes can be misunderstood, or not followed  Treatments may be given even if you do not want them  An advance directive may make it easier for your family to make difficult choices about your care  Fall Prevention    Fall prevention  includes ways to make your home and other areas safer  It also includes ways you can move more carefully to prevent a fall   Health conditions that cause changes in your blood pressure, vision, or muscle strength and coordination may increase your risk for falls  Medicines may also increase your risk for falls if they make you dizzy, weak, or sleepy  Fall prevention tips:   · Stand or sit up slowly  · Use assistive devices as directed  · Wear shoes that fit well and have soles that   · Wear a personal alarm  · Stay active  · Manage your medical conditions  Home Safety Tips:  · Add items to prevent falls in the bathroom  · Keep paths clear  · Install bright lights in your home  · Keep items you use often on shelves within reach  · Paint or place reflective tape on the edges of your stairs  Weight Management   Why it is important to manage your weight:  Being overweight increases your risk of health conditions such as heart disease, high blood pressure, type 2 diabetes, and certain types of cancer  It can also increase your risk for osteoarthritis, sleep apnea, and other respiratory problems  Aim for a slow, steady weight loss  Even a small amount of weight loss can lower your risk of health problems  How to lose weight safely:  A safe and healthy way to lose weight is to eat fewer calories and get regular exercise  You can lose up about 1 pound a week by decreasing the number of calories you eat by 500 calories each day  Healthy meal plan for weight management:  A healthy meal plan includes a variety of foods, contains fewer calories, and helps you stay healthy  A healthy meal plan includes the following:  · Eat whole-grain foods more often  A healthy meal plan should contain fiber  Fiber is the part of grains, fruits, and vegetables that is not broken down by your body  Whole-grain foods are healthy and provide extra fiber in your diet  Some examples of whole-grain foods are whole-wheat breads and pastas, oatmeal, brown rice, and bulgur  · Eat a variety of vegetables every day    Include dark, leafy greens such as spinach, kale, lee ann greens, and mustard greens  Eat yellow and orange vegetables such as carrots, sweet potatoes, and winter squash  · Eat a variety of fruits every day  Choose fresh or canned fruit (canned in its own juice or light syrup) instead of juice  Fruit juice has very little or no fiber  · Eat low-fat dairy foods  Drink fat-free (skim) milk or 1% milk  Eat fat-free yogurt and low-fat cottage cheese  Try low-fat cheeses such as mozzarella and other reduced-fat cheeses  · Choose meat and other protein foods that are low in fat  Choose beans or other legumes such as split peas or lentils  Choose fish, skinless poultry (chicken or turkey), or lean cuts of red meat (beef or pork)  Before you cook meat or poultry, cut off any visible fat  · Use less fat and oil  Try baking foods instead of frying them  Add less fat, such as margarine, sour cream, regular salad dressing and mayonnaise to foods  Eat fewer high-fat foods  Some examples of high-fat foods include french fries, doughnuts, ice cream, and cakes  · Eat fewer sweets  Limit foods and drinks that are high in sugar  This includes candy, cookies, regular soda, and sweetened drinks  Exercise:  Exercise at least 30 minutes per day on most days of the week  Some examples of exercise include walking, biking, dancing, and swimming  You can also fit in more physical activity by taking the stairs instead of the elevator or parking farther away from stores  Ask your healthcare provider about the best exercise plan for you  © Copyright [employer] 2018 Information is for End User's use only and may not be sold, redistributed or otherwise used for commercial purposes  All illustrations and images included in CareNotes® are the copyrighted property of A D A M , Inc  or [employer] [facility] Preventive Visit Patient Instructions  Thank you for completing your Welcome to Medicare Visit or Medicare Annual Wellness Visit today   Your next wellness visit will be due in one year (4/21/2023)  The screening/preventive services that you may require over the next 5-10 years are detailed below  Some tests may not apply to you based off risk factors and/or age  Screening tests ordered at today's visit but not completed yet may show as past due  Also, please note that scanned in results may not display below  Preventive Screenings:  Service Recommendations Previous Testing/Comments   Colorectal Cancer Screening  * Colonoscopy    * Fecal Occult Blood Test (FOBT)/Fecal Immunochemical Test (FIT)  * Fecal DNA/Cologuard Test  * Flexible Sigmoidoscopy Age: 54-65 years old   Colonoscopy: every 10 years (may be performed more frequently if at higher risk)  OR  FOBT/FIT: every 1 year  OR  Cologuard: every 3 years  OR  Sigmoidoscopy: every 5 years  Screening may be recommended earlier than age 48 if at higher risk for colorectal cancer  Also, an individualized decision between you and your healthcare provider will decide whether screening between the ages of 74-80 would be appropriate  Colonoscopy: Not on file  FOBT/FIT: Not on file  Cologuard: Not on file  Sigmoidoscopy: Not on file          Breast Cancer Screening Age: 36 years old  Frequency: every 1-2 years  Not required if history of left and right mastectomy Mammogram: 03/10/2021    Screening Current   Cervical Cancer Screening Between the ages of 21-29, pap smear recommended once every 3 years  Between the ages of 33-67, can perform pap smear with HPV co-testing every 5 years     Recommendations may differ for women with a history of total hysterectomy, cervical cancer, or abnormal pap smears in past  Pap Smear: Not on file    Screening Not Indicated   Hepatitis C Screening Once for adults born between 1945 and 1965  More frequently in patients at high risk for Hepatitis C Hep C Antibody: 01/22/2015    Screening Current   Diabetes Screening 1-2 times per year if you're at risk for diabetes or have pre-diabetes Fasting glucose: 83 mg/dL   A1C: 6 2 %    Screening Current   Cholesterol Screening Once every 5 years if you don't have a lipid disorder  May order more often based on risk factors  Lipid panel: 03/04/2022    Screening Current     Other Preventive Screenings Covered by Medicare:  6  Abdominal Aortic Aneurysm (AAA) Screening: covered once if your at risk  You're considered to be at risk if you have a family history of AAA  7  Lung Cancer Screening: covers low dose CT scan once per year if you meet all of the following conditions: (1) Age 50-69; (2) No signs or symptoms of lung cancer; (3) Current smoker or have quit smoking within the last 15 years; (4) You have a tobacco smoking history of at least 30 pack years (packs per day multiplied by number of years you smoked); (5) You get a written order from a healthcare provider  8  Glaucoma Screening: covered annually if you're considered high risk: (1) You have diabetes OR (2) Family history of glaucoma OR (3)  aged 48 and older OR (3)  American aged 72 and older  5  Osteoporosis Screening: covered every 2 years if you meet one of the following conditions: (1) You're estrogen deficient and at risk for osteoporosis based off medical history and other findings; (2) Have a vertebral abnormality; (3) On glucocorticoid therapy for more than 3 months; (4) Have primary hyperparathyroidism; (5) On osteoporosis medications and need to assess response to drug therapy  · Last bone density test (DXA Scan): Not on file  10  HIV Screening: covered annually if you're between the age of 12-76  Also covered annually if you are younger than 13 and older than 72 with risk factors for HIV infection  For pregnant patients, it is covered up to 3 times per pregnancy      Immunizations:  Immunization Recommendations   Influenza Vaccine Annual influenza vaccination during flu season is recommended for all persons aged >= 6 months who do not have contraindications   Pneumococcal Vaccine (Prevnar and Pneumovax)  * Prevnar = PCV13  * Pneumovax = PPSV23   Adults 25-60 years old: 1-3 doses may be recommended based on certain risk factors  Adults 72 years old: Prevnar (PCV13) vaccine recommended followed by Pneumovax (PPSV23) vaccine  If already received PPSV23 since turning 65, then PCV13 recommended at least one year after PPSV23 dose  Hepatitis B Vaccine 3 dose series if at intermediate or high risk (ex: diabetes, end stage renal disease, liver disease)   Tetanus (Td) Vaccine - COST NOT COVERED BY MEDICARE PART B Following completion of primary series, a booster dose should be given every 10 years to maintain immunity against tetanus  Td may also be given as tetanus wound prophylaxis  Tdap Vaccine - COST NOT COVERED BY MEDICARE PART B Recommended at least once for all adults  For pregnant patients, recommended with each pregnancy  Shingles Vaccine (Shingrix) - COST NOT COVERED BY MEDICARE PART B  2 shot series recommended in those aged 48 and above     Health Maintenance Due:      Topic Date Due    HIV Screening  Never done    Colorectal Cancer Screening  Never done    Breast Cancer Screening: Mammogram  03/10/2022    Hepatitis C Screening  Completed     Immunizations Due:      Topic Date Due    Pneumococcal Vaccine: 65+ Years (2 of 2 - PPSV23) 01/11/2022     Advance Directives   What are advance directives? Advance directives are legal documents that state your wishes and plans for medical care  These plans are made ahead of time in case you lose your ability to make decisions for yourself  Advance directives can apply to any medical decision, such as the treatments you want, and if you want to donate organs  What are the types of advance directives? There are many types of advance directives, and each state has rules about how to use them  You may choose a combination of any of the following:  · Living will:   This is a written record of the treatment you want  You can also choose which treatments you do not want, which to limit, and which to stop at a certain time  This includes surgery, medicine, IV fluid, and tube feedings  · Durable power of  for healthcare [place] [facility]): This is a written record that states who you want to make healthcare choices for you when you are unable to make them for yourself  This person, called a proxy, is usually a family member or a friend  You may choose more than 1 proxy  · Do not resuscitate (DNR) order:  A DNR order is used in case your heart stops beating or you stop breathing  It is a request not to have certain forms of treatment, such as CPR  A DNR order may be included in other types of advance directives  · Medical directive: This covers the care that you want if you are in a coma, near death, or unable to make decisions for yourself  You can list the treatments you want for each condition  Treatment may include pain medicine, surgery, blood transfusions, dialysis, IV or tube feedings, and a ventilator (breathing machine)  · Values history: This document has questions about your views, beliefs, and how you feel and think about life  This information can help others choose the care that you would choose  Why are advance directives important? An advance directive helps you control your care  Although spoken wishes may be used, it is better to have your wishes written down  Spoken wishes can be misunderstood, or not followed  Treatments may be given even if you do not want them  An advance directive may make it easier for your family to make difficult choices about your care  Fall Prevention    Fall prevention  includes ways to make your home and other areas safer  It also includes ways you can move more carefully to prevent a fall  Health conditions that cause changes in your blood pressure, vision, or muscle strength and coordination may increase your risk for falls   Medicines may also increase your risk for falls if they make you dizzy, weak, or sleepy  Fall prevention tips:   · Stand or sit up slowly  · Use assistive devices as directed  · Wear shoes that fit well and have soles that   · Wear a personal alarm  · Stay active  · Manage your medical conditions  Home Safety Tips:  · Add items to prevent falls in the bathroom  · Keep paths clear  · Install bright lights in your home  · Keep items you use often on shelves within reach  · Paint or place reflective tape on the edges of your stairs  Weight Management   Why it is important to manage your weight:  Being overweight increases your risk of health conditions such as heart disease, high blood pressure, type 2 diabetes, and certain types of cancer  It can also increase your risk for osteoarthritis, sleep apnea, and other respiratory problems  Aim for a slow, steady weight loss  Even a small amount of weight loss can lower your risk of health problems  How to lose weight safely:  A safe and healthy way to lose weight is to eat fewer calories and get regular exercise  You can lose up about 1 pound a week by decreasing the number of calories you eat by 500 calories each day  Healthy meal plan for weight management:  A healthy meal plan includes a variety of foods, contains fewer calories, and helps you stay healthy  A healthy meal plan includes the following:  · Eat whole-grain foods more often  A healthy meal plan should contain fiber  Fiber is the part of grains, fruits, and vegetables that is not broken down by your body  Whole-grain foods are healthy and provide extra fiber in your diet  Some examples of whole-grain foods are whole-wheat breads and pastas, oatmeal, brown rice, and bulgur  · Eat a variety of vegetables every day  Include dark, leafy greens such as spinach, kale, lee ann greens, and mustard greens  Eat yellow and orange vegetables such as carrots, sweet potatoes, and winter squash     · Eat a variety of fruits every day  Choose fresh or canned fruit (canned in its own juice or light syrup) instead of juice  Fruit juice has very little or no fiber  · Eat low-fat dairy foods  Drink fat-free (skim) milk or 1% milk  Eat fat-free yogurt and low-fat cottage cheese  Try low-fat cheeses such as mozzarella and other reduced-fat cheeses  · Choose meat and other protein foods that are low in fat  Choose beans or other legumes such as split peas or lentils  Choose fish, skinless poultry (chicken or turkey), or lean cuts of red meat (beef or pork)  Before you cook meat or poultry, cut off any visible fat  · Use less fat and oil  Try baking foods instead of frying them  Add less fat, such as margarine, sour cream, regular salad dressing and mayonnaise to foods  Eat fewer high-fat foods  Some examples of high-fat foods include french fries, doughnuts, ice cream, and cakes  · Eat fewer sweets  Limit foods and drinks that are high in sugar  This includes candy, cookies, regular soda, and sweetened drinks  Exercise:  Exercise at least 30 minutes per day on most days of the week  Some examples of exercise include walking, biking, dancing, and swimming  You can also fit in more physical activity by taking the stairs instead of the elevator or parking farther away from stores  Ask your healthcare provider about the best exercise plan for you  © Copyright [employer] 2018 Information is for End User's use only and may not be sold, redistributed or otherwise used for commercial purposes   All illustrations and images included in CareNotes® are the copyrighted property of A D A M , Inc  or [address] [place] [address]

## 2022-05-19 ENCOUNTER — OFFICE VISIT (OUTPATIENT)
Dept: FAMILY MEDICINE CLINIC | Facility: CLINIC | Age: 67
End: 2022-05-19
Payer: COMMERCIAL

## 2022-05-19 VITALS
SYSTOLIC BLOOD PRESSURE: 140 MMHG | TEMPERATURE: 97.7 F | HEART RATE: 90 BPM | WEIGHT: 202.4 LBS | OXYGEN SATURATION: 99 % | HEIGHT: 65 IN | BODY MASS INDEX: 33.72 KG/M2 | DIASTOLIC BLOOD PRESSURE: 84 MMHG

## 2022-05-19 DIAGNOSIS — H61.21 HEARING LOSS DUE TO CERUMEN IMPACTION, RIGHT: Primary | ICD-10-CM

## 2022-05-19 PROCEDURE — 3725F SCREEN DEPRESSION PERFORMED: CPT | Performed by: PHYSICIAN ASSISTANT

## 2022-05-19 PROCEDURE — 99213 OFFICE O/P EST LOW 20 MIN: CPT | Performed by: PHYSICIAN ASSISTANT

## 2022-05-19 PROCEDURE — 1036F TOBACCO NON-USER: CPT | Performed by: PHYSICIAN ASSISTANT

## 2022-05-19 PROCEDURE — 1101F PT FALLS ASSESS-DOCD LE1/YR: CPT | Performed by: PHYSICIAN ASSISTANT

## 2022-05-19 PROCEDURE — 3008F BODY MASS INDEX DOCD: CPT | Performed by: PHYSICIAN ASSISTANT

## 2022-05-19 PROCEDURE — 69209 REMOVE IMPACTED EAR WAX UNI: CPT | Performed by: PHYSICIAN ASSISTANT

## 2022-05-19 PROCEDURE — 1160F RVW MEDS BY RX/DR IN RCRD: CPT | Performed by: PHYSICIAN ASSISTANT

## 2022-05-19 PROCEDURE — 3288F FALL RISK ASSESSMENT DOCD: CPT | Performed by: PHYSICIAN ASSISTANT

## 2022-05-19 NOTE — PROGRESS NOTES
Assessment/Plan:     Diagnoses and all orders for this visit:    Hearing loss due to cerumen impaction, right  Comments:  Successful right ear irrigation    Other orders  -     Ear cerumen removal          Subjective:      Patient ID: Jeromy Garcia is a 73 y o  female  Patient presents in the office with right ear being block  Patient states she has been using over-the-counter earwax softener bilaterally and trying to flush the ears L  Patient states she seems to have cleared the left ear out but however her right ear remains blocked and she has decreased hearing    On exam she has right ear cerumen impaction        The following portions of the patient's history were reviewed and updated as appropriate:   She   Patient Active Problem List    Diagnosis Date Noted    Prediabetes 10/26/2021    DOMENIC on CPAP     Screening for breast cancer 11/15/2018    Screen for colon cancer 11/15/2018    Hyponatremia 11/15/2018    Other persistent atrial fibrillation ([facility] ) 11/15/2018    Long-term use of high-risk medication 09/13/2018    Extrapyramidal reaction 05/02/2018    Bipolar I disorder, most recent episode mixed, in full remission ([facility] ) 06/24/2013     Current Outpatient Medications   Medication Sig Dispense Refill    acetaminophen (TYLENOL) 325 mg tablet Take 650 mg by mouth every 6 (six) hours as needed for mild pain      apixaban (Eliquis) 5 mg Take 1 tablet (5 mg total) by mouth 2 (two) times a day 180 tablet 3    ascorbic acid (VITAMIN C) 500 mg tablet Take 500 mg by mouth daily      benztropine (COGENTIN) 1 mg tablet Take 1 tablet (1 mg total) by mouth 2 (two) times a day 180 tablet 2    divalproex sodium (DEPAKOTE ER) 500 mg 24 hr tablet Take 1 tablet (500 mg total) by mouth every evening 90 tablet 2    furosemide (LASIX) 20 mg tablet TAKE 1 TABLET BY MOUTH EVERY DAY 90 tablet 3    metoprolol tartrate (LOPRESSOR) 50 mg tablet TAKE 1 TABLET EVERY 12 HOURS 180 tablet 2    Multiple Vitamin (MULTI-VITAMIN Called and left VM for patient DAILY) TABS Take 1 tablet by mouth daily      potassium chloride (MICRO-K) 10 MEQ CR capsule TAKE 1 CAPSULE BY MOUTH EVERY DAY 90 capsule 3    risperiDONE (RisperDAL) 1 mg tablet Take 1 tablet (1 mg total) by mouth every evening 90 tablet 2     No current facility-administered medications for this visit  She is allergic to levaquin [levofloxacin], ampicillin, and clindamycin       Review of Systems   HENT: Positive for hearing loss  Negative for ear discharge and ear pain  Objective:        Physical Exam  Constitutional:       Appearance: Normal appearance  HENT:      Head: Normocephalic and atraumatic  Right Ear: External ear normal  There is impacted cerumen  Left Ear: Tympanic membrane, ear canal and external ear normal    Eyes:      Conjunctiva/sclera: Conjunctivae normal    Pulmonary:      Effort: Pulmonary effort is normal    Neurological:      Mental Status: She is alert  Ear cerumen removal    Date/Time: 5/19/2022 4:28 PM  Performed by: Eden Burr PA-C  Authorized by: Eden Burr PA-C   Universal Protocol:  Consent: Verbal consent obtained  Written consent not obtained  Procedure details:     Local anesthetic:  None    Location:  R ear    Procedure type: irrigation only      Approach:  External  Post-procedure details:     Complication:  None    Hearing quality:  Improved    Patient tolerance of procedure:   Tolerated well, no immediate complications

## 2022-05-25 ENCOUNTER — HOSPITAL ENCOUNTER (OUTPATIENT)
Dept: NON INVASIVE DIAGNOSTICS | Facility: CLINIC | Age: 67
Discharge: HOME | End: 2022-05-25
Payer: COMMERCIAL

## 2022-05-25 VITALS
DIASTOLIC BLOOD PRESSURE: 84 MMHG | WEIGHT: 202 LBS | HEIGHT: 65 IN | BODY MASS INDEX: 33.66 KG/M2 | SYSTOLIC BLOOD PRESSURE: 140 MMHG | HEART RATE: 88 BPM

## 2022-05-25 DIAGNOSIS — I50.32 CHRONIC DIASTOLIC CONGESTIVE HEART FAILURE (HCC): ICD-10-CM

## 2022-05-25 LAB
AORTIC ROOT: 2.9 CM
APICAL FOUR CHAMBER EJECTION FRACTION: 60 %
FRACTIONAL SHORTENING: 28 % (ref 28–44)
INTERVENTRICULAR SEPTUM IN DIASTOLE (PARASTERNAL SHORT AXIS VIEW): 0.9 CM
INTERVENTRICULAR SEPTUM: 0.9 CM (ref 0.6–1.1)
LAAS-AP2: 22.8 CM2
LAAS-AP4: 27.2 CM2
LEFT ATRIUM SIZE: 3.9 CM
LEFT INTERNAL DIMENSION IN SYSTOLE: 2.3 CM (ref 2.1–4)
LEFT VENTRICULAR INTERNAL DIMENSION IN DIASTOLE: 3.2 CM (ref 3.5–6)
LEFT VENTRICULAR POSTERIOR WALL IN END DIASTOLE: 0.9 CM
LEFT VENTRICULAR STROKE VOLUME: 22 ML
LVSV (TEICH): 22 ML
PA SYSTOLIC PRESSURE: 32 MMHG
RIGHT ATRIUM AREA SYSTOLE A4C: 21.6 CM2
RIGHT VENTRICLE ID DIMENSION: 3.8 CM
SL CV LEFT ATRIUM LENGTH A2C: 6.4 CM
SL CV LV EF: 60
SL CV PED ECHO LEFT VENTRICLE DIASTOLIC VOLUME (MOD BIPLANE) 2D: 41 ML
SL CV PED ECHO LEFT VENTRICLE SYSTOLIC VOLUME (MOD BIPLANE) 2D: 19 ML
TR MAX PG: 27 MMHG
TR PEAK VELOCITY: 2.6 M/S
TRICUSPID VALVE PEAK REGURGITATION VELOCITY: 2.6 M/S

## 2022-05-25 PROCEDURE — 93306 TTE W/DOPPLER COMPLETE: CPT | Performed by: INTERNAL MEDICINE

## 2022-05-25 PROCEDURE — 93306 TTE W/DOPPLER COMPLETE: CPT

## 2022-06-13 ENCOUNTER — HOSPITAL ENCOUNTER (OUTPATIENT)
Dept: RADIOLOGY | Facility: MEDICAL CENTER | Age: 67
Discharge: HOME | End: 2022-06-13
Payer: COMMERCIAL

## 2022-06-13 VITALS — BODY MASS INDEX: 33.66 KG/M2 | HEIGHT: 65 IN | WEIGHT: 202 LBS

## 2022-06-13 DIAGNOSIS — Z12.31 ENCOUNTER FOR SCREENING MAMMOGRAM FOR MALIGNANT NEOPLASM OF BREAST: ICD-10-CM

## 2022-06-13 PROCEDURE — 77067 SCR MAMMO BI INCL CAD: CPT

## 2022-06-13 PROCEDURE — 77063 BREAST TOMOSYNTHESIS BI: CPT

## 2022-07-13 DIAGNOSIS — I48.91 ATRIAL FIBRILLATION WITH RVR (HCC): ICD-10-CM

## 2022-07-13 RX ORDER — METOPROLOL TARTRATE 50 MG/1
TABLET, FILM COATED ORAL
Qty: 180 TABLET | Refills: 0 | Status: SHIPPED | OUTPATIENT
Start: 2022-07-13 | End: 2022-10-10
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
## 2022-07-26 ENCOUNTER — OFFICE VISIT (OUTPATIENT)
Dept: PSYCHIATRY | Facility: CLINIC | Age: 67
End: 2022-07-26
Payer: COMMERCIAL

## 2022-07-26 VITALS
SYSTOLIC BLOOD PRESSURE: 106 MMHG | HEART RATE: 81 BPM | DIASTOLIC BLOOD PRESSURE: 69 MMHG | BODY MASS INDEX: 33.66 KG/M2 | WEIGHT: 202 LBS | HEIGHT: 65 IN

## 2022-07-26 DIAGNOSIS — G25.9 EXTRAPYRAMIDAL REACTION: Chronic | ICD-10-CM

## 2022-07-26 DIAGNOSIS — Z79.899 LONG-TERM USE OF HIGH-RISK MEDICATION: Chronic | ICD-10-CM

## 2022-07-26 DIAGNOSIS — F31.78 BIPOLAR I DISORDER, MOST RECENT EPISODE MIXED, IN FULL REMISSION (HCC): Primary | Chronic | ICD-10-CM

## 2022-07-26 PROCEDURE — 1160F RVW MEDS BY RX/DR IN RCRD: CPT | Performed by: PSYCHIATRY & NEUROLOGY

## 2022-07-26 PROCEDURE — 90833 PSYTX W PT W E/M 30 MIN: CPT | Performed by: PSYCHIATRY & NEUROLOGY

## 2022-07-26 PROCEDURE — 3725F SCREEN DEPRESSION PERFORMED: CPT | Performed by: PSYCHIATRY & NEUROLOGY

## 2022-07-26 PROCEDURE — 99214 OFFICE O/P EST MOD 30 MIN: CPT | Performed by: PSYCHIATRY & NEUROLOGY

## 2022-07-26 RX ORDER — BENZTROPINE MESYLATE 1 MG/1
1 TABLET ORAL 2 TIMES DAILY
Qty: 180 TABLET | Refills: 2 | Status: SHIPPED | OUTPATIENT
Start: 2022-07-26 | End: 2022-07-28 | Stop reason: SDUPTHER

## 2022-07-26 RX ORDER — RISPERIDONE 1 MG/1
1 TABLET, FILM COATED ORAL EVERY EVENING
Qty: 90 TABLET | Refills: 2 | Status: SHIPPED | OUTPATIENT
Start: 2022-07-26 | End: 2022-07-28 | Stop reason: SDUPTHER

## 2022-07-26 RX ORDER — DIVALPROEX SODIUM 500 MG/1
500 TABLET, EXTENDED RELEASE ORAL EVERY EVENING
Qty: 90 TABLET | Refills: 2 | Status: SHIPPED | OUTPATIENT
Start: 2022-07-26 | End: 2022-07-28 | Stop reason: SDUPTHER

## 2022-07-26 NOTE — BH TREATMENT PLAN
TREATMENT PLAN (Medication Management Only)        [facility]    Name/Date of Birth/MRN:  Aleyda Allred 73 y o  1955 MRN: [id number]  Date of Treatment Plan: July 26, 2022  Diagnosis/Diagnoses:   1  Bipolar I disorder, most recent episode mixed, in full remission ([facility] )    2  Extrapyramidal reaction    3  Long-term use of high-risk medication      Strengths/Personal Resources for Self-Care: "taking my medicine when I am supposed to, taking a break to unwind"  Area/Areas of need (in own words): "working on a diet and weight, keeping new things, watching my sleep"  1  Long Term Goal:   improve control of anxiety, maintain mood stability  Target Date: 4 months - 11/26/2022  Person/Persons responsible for completion of goal: King Mili  2  Short Term Objective (s) - How will we reach this goal?:   A  Provider new recommended medication/dosage changes and/or continue medication(s): continue current medications as prescribed (Depakote ER, Risperdal and Cogentin)  B   N/A   C   N/A  Target Date: 4 months - 11/26/2022  Person/Persons Responsible for Completion of Goal: King Mili   Progress Towards Goals: stable  Treatment Modality: medication management every 4 months  Review due 180 days from date of this plan: 6 months - 1/26/2023  Expected length of service: maintenance unless revised  My Physician/PA/NP and I have developed this plan together and I agree to work on the goals and objectives  I understand the treatment goals that were developed for my treatment    Electronic Signatures: on file (unless signed below)    Mary Garcia MD 07/26/22

## 2022-07-28 DIAGNOSIS — G25.9 EXTRAPYRAMIDAL REACTION: Chronic | ICD-10-CM

## 2022-07-28 DIAGNOSIS — F31.78 BIPOLAR I DISORDER, MOST RECENT EPISODE MIXED, IN FULL REMISSION (HCC): Primary | Chronic | ICD-10-CM

## 2022-07-28 RX ORDER — DIVALPROEX SODIUM 500 MG/1
500 TABLET, EXTENDED RELEASE ORAL EVERY EVENING
Qty: 90 TABLET | Refills: 2 | Status: SHIPPED | OUTPATIENT
Start: 2022-07-28 | End: 2023-04-24

## 2022-07-28 RX ORDER — RISPERIDONE 1 MG/1
1 TABLET, FILM COATED ORAL EVERY EVENING
Qty: 90 TABLET | Refills: 2 | Status: SHIPPED | OUTPATIENT
Start: 2022-07-28 | End: 2023-04-24

## 2022-07-28 RX ORDER — BENZTROPINE MESYLATE 1 MG/1
1 TABLET ORAL 2 TIMES DAILY
Qty: 180 TABLET | Refills: 2 | Status: SHIPPED | OUTPATIENT
Start: 2022-07-28 | End: 2023-04-24

## 2022-07-28 NOTE — TELEPHONE ENCOUNTER
Codyperdal, Depakote MARCY and Cogentin were all escripted to [facility] for 90 days with 2 RF

## 2022-07-28 NOTE — TELEPHONE ENCOUNTER
Hi, patient called and would like provider to send the scripts of the following refills of this medications to [facility]  Patient requested to cancel the one sent in [facility] if provider happen to send script there

## 2022-07-29 ENCOUNTER — OFFICE VISIT (OUTPATIENT)
Dept: CARDIOLOGY CLINIC | Facility: CLINIC | Age: 67
End: 2022-07-29
Payer: COMMERCIAL

## 2022-07-29 VITALS
DIASTOLIC BLOOD PRESSURE: 72 MMHG | SYSTOLIC BLOOD PRESSURE: 116 MMHG | BODY MASS INDEX: 31.14 KG/M2 | HEART RATE: 79 BPM | WEIGHT: 205.5 LBS | HEIGHT: 68 IN

## 2022-07-29 DIAGNOSIS — I48.91 ATRIAL FIBRILLATION WITH RVR (HCC): Primary | ICD-10-CM

## 2022-07-29 PROCEDURE — 93000 ELECTROCARDIOGRAM COMPLETE: CPT | Performed by: INTERNAL MEDICINE

## 2022-07-29 PROCEDURE — 99214 OFFICE O/P EST MOD 30 MIN: CPT | Performed by: INTERNAL MEDICINE

## 2022-07-29 NOTE — PROGRESS NOTES
HEART  [facility]    Outpatient f/u  Today's Date: 07/29/22        Patient name: Kranthi Avendano  YOB: 1955  Sex: female         Chief Complaint: f/u persistent afib      ASSESSMENT:  Problem List Items Addressed This Visit    None     Visit Diagnoses     Atrial fibrillation with RVR [facility])    -  Primary    Relevant Orders    POCT ECG        72 yo female  1) Persistent afib- Dx Nov 2018 found incidentaly by PCP then, tried DCCV Jan 2019 unclear how long she stayed in NSR after each DCCV   Her last DCCV took 4 shocks to restore NSR, 300J  Only mild LAE enlargement      She doesn't feel palpiations but notes SOB, worse edema, fatigue, decreased exercise tolerance and chest discomfort daily since dx w afib  Was planning abaltion but has insurance issues        2) [id number]     3) Chronic LE edema, added lasix last visit and slightly improved  4) Bipolar on Risperadone and divalproex  QTc 435ms today    5) DOMENIC on CPAP, dx after afib  She reports compliance    PLAN:  1  Feeling overal pretty well, not sure worth risks of afib ablation especially with lower yield of success since shes been in it for 3 years now  2  Cont rate control, eliquis  Lasix,     Orders Placed This Encounter   Procedures    POCT ECG     There are no discontinued medications    HPI/Subjective:    72 yo female  1) Persistent afib- Dx Nov 2018 found incidentaly by PCP then, tried DCCV Jan 2019 unclear how long she stayed in NSR after each DCCV   Her last DCCV took 4 shocks to restore NSR, 300J  Only mild LAE enlargement      She doesn't feel palpiations but notes SOB, worse edema, fatigue, decreased exercise tolerance and chest discomfort daily since dx w afib        Was planning abaltion but has insurance issues        2) [id number]     3) Chronic LE edema, added lasix last visit and slightly improved  4) Bipolar on Risperadone and divalproex  QTc 435ms today    5) DOMENIC on CPAP, dx after afib  She reports compliance    Complete 12 point ROS reviewed and otherwise non pertinent or negative except as per HPI  Please see paper chart for outpatient clinic patients where the patient completed the 12 point ROS survey  Past Medical History:   Diagnosis Date    Atrial fibrillation ([facility] [address] ) 2019    Bipolar disorder ([facility] [address] )     bipolar    Leg edema     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    Sleep apnea     Varicose veins of legs        Allergies   Allergen Reactions    Levaquin [Levofloxacin]      "heavy legs"    Ampicillin Rash     Category: Allergy;     Clindamycin Rash     Category: Allergy;      I reviewed the Home Medication list and Allergies in the chart  Scheduled Meds:  Current Outpatient Medications   Medication Sig Dispense Refill    acetaminophen (TYLENOL) 325 mg tablet Take 650 mg by mouth every 6 (six) hours as needed for mild pain      apixaban (Eliquis) 5 mg Take 1 tablet (5 mg total) by mouth 2 (two) times a day 180 tablet 3    ascorbic acid (VITAMIN C) 500 mg tablet Take 500 mg by mouth daily      benztropine (COGENTIN) 1 mg tablet Take 1 tablet (1 mg total) by mouth 2 (two) times a day 180 tablet 2    divalproex sodium (DEPAKOTE ER) 500 mg 24 hr tablet Take 1 tablet (500 mg total) by mouth every evening 90 tablet 2    furosemide (LASIX) 20 mg tablet TAKE 1 TABLET BY MOUTH EVERY DAY 90 tablet 3    metoprolol tartrate (LOPRESSOR) 50 mg tablet TAKE ONE TABLET BY MOUTH EVERY 12 HOURS 180 tablet 0    Multiple Vitamin (MULTI-VITAMIN DAILY) TABS Take 1 tablet by mouth daily      potassium chloride (MICRO-K) 10 MEQ CR capsule TAKE 1 CAPSULE BY MOUTH EVERY DAY 90 capsule 3    risperiDONE (RisperDAL) 1 mg tablet Take 1 tablet (1 mg total) by mouth every evening 90 tablet 2     No current facility-administered medications for this visit  PRN Meds:         Family History   Problem Relation Age of Onset   [facility] Maternal Aunt     Breast cancer Maternal Aunt     Stroke Mother         CVA    Heart failure Mother     Diabetes Mother     Hypertension Mother     Thyroid disease Mother     Gallbladder disease Mother         gallstones    Diabetes Maternal Grandmother     No Known Problems Maternal Grandfather     No Known Problems Paternal Grandmother     No Known Problems Paternal Grandfather     No Known Problems Maternal Aunt     No Known Problems Maternal Aunt     Substance Abuse Neg Hx     Suicide Attempts Neg Hx        Social History     Socioeconomic History    Marital status: /Civil Union     Spouse name: Not on file    Number of children: 1    Years of education: 15    Highest education level: 12th grade   Occupational History    Occupation: unemployed   Tobacco Use    Smoking status: Former Smoker    Smokeless tobacco: Never Used   Vaping Use    Vaping Use: Never used   Substance and Sexual Activity    Alcohol use: No    Drug use: No    Sexual activity: Yes     Partners: Male   Other Topics Concern    Not on file   Social History Narrative    Education: high school graduate    Learning Disabilities: none    Marital History:     Children: 1 adult son    Living Arrangement: lives in home with     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, unemployed    Functioning Relationships: good support system,  is supportive    Legal History: none     History: None    Always uses seat belt      Social Determinants of Health     Financial Resource Strain: Medium Risk    Difficulty of Paying Living Expenses: Somewhat hard   Food Insecurity: No Food Insecurity    Worried About Running Out of Food in the Last Year: Never true    [address] in the Last Year: Never true   Transportation Needs: No Transportation Needs    Lack of Transportation (Medical):  No    Lack of Transportation (Non-Medical): No   Physical Activity: Sufficiently Active    Days of Exercise per Week: 7 days    Minutes of Exercise per Session: 60 min   Stress: No Stress Concern Present    Feeling of Stress : Only a little   Social Connections: Moderately Isolated    Frequency of Communication with Friends and Family: Twice a week    Frequency of Social Gatherings with Friends and Family: Twice a week    Attends [employer]ist Services: Never    Active Member of Clubs or Organizations: No    Attends Club or Organization Meetings: Never    Marital Status:    Intimate Partner Violence: Not At Risk    Fear of Current or Ex-Partner: No    Emotionally Abused: No    Physically Abused: No    Sexually Abused: No   Housing Stability: [address] Unable to Pay for Housing in the Last Year: No    Number of [place] in the Last Year: 1    Unstable Housing in the Last Year: No       OBJECTIVE:    /72 (BP Location: Right arm, Patient Position: Sitting, Cuff Size: Standard)   Pulse 79   Ht 5' 8" (1 727 m)   Wt 93 2 kg (205 lb 8 oz)   BMI 31 25 kg/m²   Vitals:    07/29/22 1436   Weight: 93 2 kg (205 lb 8 oz)     /72 (BP Location: Right arm, Patient Position: Sitting, Cuff Size: Standard)   Pulse 79   Ht 5' 8" (1 727 m)   Wt 93 2 kg (205 lb 8 oz)   BMI 31 25 kg/m²   Vitals:    07/29/22 1436   Weight: 93 2 kg (205 lb 8 oz)     /72 (BP Location: Right arm, Patient Position: Sitting, Cuff Size: Standard)   Pulse 79   Ht 5' 8" (1 727 m)   Wt 93 2 kg (205 lb 8 oz)   BMI 31 25 kg/m²   Vitals:    07/29/22 1436   Weight: 93 2 kg (205 lb 8 oz)     /72 (BP Location: Right arm, Patient Position: Sitting, Cuff Size: Standard)   Pulse 79   Ht 5' 8" (1 727 m)   Wt 93 2 kg (205 lb 8 oz)   BMI 31 25 kg/m²   Vitals:    07/29/22 1436   Weight: 93 2 kg (205 lb 8 oz)     GEN: No acute distress, Alert and oriented, well appearing  HEENT:External ears normal, wearing a mask     EYES: Pupils equal, sclera anicteric, midline, normal conjuctiva  NECK: No JVD, supple, no obvious masses or thryomegaly or goiter  CARDIOVASCULAR:  RRR, No murmur, rub, gallops S1,S2  LUNGS: Clear To auscultation bilaterally, normal effort, no rales, rhonchi, crackles   ABDOMEN:  nondistended,  without obvious organomegaly or ascites  EXTREMITIES/VASCULAR:  No edema  warm an well perfused  PSYCH: Normal Affect,  linear speech pattern without evidence of psychosis  NEURO: Grossly intact, moving all extremiteis equal, face symmetric, alert and responsive, no obvious focal defecits   GAIT:  Ambulates normally without difficulty  HEME: No bleeding, bruising, petechia, purpura   SKIN: No significant rashes on visibile skin, warm, no diaphoresis or pallor  Lab Results:       LABS:      Chemistry        Component Value Date/Time     (L) 01/08/2016 1424    K 4 2 03/04/2022 1114    K 4 3 01/08/2016 1424     03/04/2022 1114     01/08/2016 1424    CO2 30 03/04/2022 1114    CO2 30 01/08/2016 1424    BUN 16 03/04/2022 1114    BUN 16 01/08/2016 1424    CREATININE 0 75 03/04/2022 1114    CREATININE 0 71 01/08/2016 1424        Component Value Date/Time    CALCIUM 10 1 03/04/2022 1114    CALCIUM 8 9 01/08/2016 1424    ALKPHOS 49 03/04/2022 1114    ALKPHOS 59 01/08/2016 1424    AST 19 03/04/2022 1114    AST 15 01/08/2016 1424    ALT 23 03/04/2022 1114    ALT 19 01/08/2016 1424    BILITOT 0 56 01/08/2016 1424            No results found for: CHOL  Lab Results   Component Value Date    HDL 47 (L) 03/04/2022    HDL 44 10/07/2021    HDL 44 01/05/2021     Lab Results   Component Value Date    LDLCALC 106 (H) 03/04/2022    LDLCALC 110 (H) 10/07/2021    LDLCALC 111 (H) 01/05/2021     Lab Results   Component Value Date    TRIG 81 03/04/2022    TRIG 89 10/07/2021    TRIG 106 01/05/2021     No results found for: CHOLHDL    IMAGING: No results found       Cardiac testing:   Results for orders placed during the hospital encounter of 11/15/18   Echo complete with contrast if indicated    Narrative [address] [facility]  [phone]    Transthoracic Echocardiogram  2D, M-mode, Doppler, and Color Doppler    Study date:  2018    Patient: Juany Fonseca  MR number: [id number]  Account number: [de-identified]  : 1955  Age: 65 years  Gender: Female  Status: Inpatient  Location: Bedside  Height: 70 in  Weight: 194 7 lb  BP: 105/ 65 mmHg    Indications: Atrial fibrillation  Diagnoses: I48 0 - Atrial fibrillation    Sonographer:  Shelley Porter RDCS  Primary Physician:  Jed Lima MD  Referring Physician:  CINDY Lambert  Group:  [facility]  Interpreting Physician:  Thalia Bernstein MD    SUMMARY    LEFT VENTRICLE:  Systolic function was normal  Ejection fraction was estimated to be 65 %  There were no regional wall motion abnormalities  LEFT ATRIUM:  The atrium was mildly dilated  RIGHT ATRIUM:  The atrium was mildly dilated  MITRAL VALVE:  There was mild annular calcification  There was trace regurgitation  TRICUSPID VALVE:  There was mild regurgitation  Pulmonary artery systolic pressure was within the normal range  HISTORY: PRIOR HISTORY: AFIB    PROCEDURE: The procedure was performed at the bedside  This was a routine study  The transthoracic approach was used  The study included complete 2D imaging, M-mode, complete spectral Doppler, and color Doppler  The heart rate was 50 bpm,  at the start of the study  Images were obtained from the parasternal, apical, subcostal, and suprasternal notch acoustic windows  Image quality was adequate  LEFT VENTRICLE: Size was normal  Systolic function was normal  Ejection fraction was estimated to be 65 %  There were no regional wall motion abnormalities   Wall thickness was normal  DOPPLER: Left ventricular diastolic function parameters  were abnormal  There was no evidence of elevated ventricular filling pressure by Doppler parameters  RIGHT VENTRICLE: The size was normal  Systolic function was normal  Wall thickness was normal     LEFT ATRIUM: The atrium was mildly dilated  RIGHT ATRIUM: The atrium was mildly dilated  MITRAL VALVE: There was mild annular calcification  There was mild diffuse thickening  There was normal leaflet separation  DOPPLER: The transmitral velocity was within the normal range  There was no evidence for stenosis  There was trace  regurgitation  AORTIC VALVE: The valve was trileaflet  Leaflets exhibited normal thickness, normal cuspal separation, and sclerosis  DOPPLER: Transaortic velocity was within the normal range  There was no evidence for stenosis  There was no significant  regurgitation  TRICUSPID VALVE: The valve structure was normal  There was normal leaflet separation  DOPPLER: The transtricuspid velocity was within the normal range  There was no evidence for stenosis  There was mild regurgitation  Pulmonary artery  systolic pressure was within the normal range  PULMONIC VALVE: Leaflets exhibited normal thickness, no calcification, and normal cuspal separation  DOPPLER: The transpulmonic velocity was within the normal range  There was no significant regurgitation  PERICARDIUM: There was no pericardial effusion  The pericardium was normal in appearance  AORTA: The root exhibited normal size  SYSTEMIC VEINS: IVC: The inferior vena cava was not well visualized  PULMONARY VEINS: DOPPLER: Doppler signals were not recordable in the pulmonary vein(s)      SYSTEM MEASUREMENT TABLES    2D  %FS: 33 42 %  AV Diam: 2 94 cm  EDV(Teich): 75 34 ml  EF(Teich): 62 58 %  ESV(Teich): 28 19 ml  IVSd: 0 68 cm  LA Area: 18 29 cm2  LA Diam: 2 84 cm  LVEDV MOD A4C: 94 08 ml  LVEF MOD A4C: 73 89 %  LVESV MOD A4C: 24 57 ml  LVIDd: 4 13 cm  LVIDs: 2 75 cm  LVLd A4C: 7 89 cm  LVLs A4C: 6 cm  LVPWd: 0 88 cm  RA Area: 20 69 cm2  RVIDd: 3 26 cm  SV MOD A4C: 69 51 ml  SV(Teich): 47 15 ml    CW  TR MaxP 84 mmHg  TR Vmax: 2 28 m/s    MM  TAPSE: 2 54 cm    [facility]    Prepared and electronically signed by    Cherelle Wilson MD  Signed [id number] 13:26:56       No results found for this or any previous visit  No results found for this or any previous visit  Results for orders placed during the hospital encounter of 19   NM myocardial perfusion spect (stress and/or rest)    [facility] , [address] [facility]  [phone]    Rest/Stress Gated SPECT Myocardial Perfusion Imaging After Regadenoson and Exercise    Patient: Ekaterina Bojorquez [facility]  MR number: [id number]  Account number: [de-identified]  : 1955  Age: 59 years  Gender: Female  Status: Outpatient  Location: [address] [place] [address]  Height: 70 in  Weight: 197 lb  BP: 130/ 82 mmHg    Allergies: LEVOFLOXACIN, AMPICILLIN, CLINDAMYCIN    Diagnosis: I48 0 - Atrial fibrillation    Primary Physician:  Iván Burdick  RN:  Gordon Mayer RN  Referring Physician:  Cherelle Wilson MD  Technician:  Bridgette Monzon  Group:  [facility]  Report Prepared By[de-identified]  Gordon Mayer RN  Interpreting Physician:  Teodora Dubin, MD    INDICATIONS: Coronary artery disease  HISTORY: The patient is a 59year old  female  Chest pain status: no chest pain  Other symptoms: dyspnea  Cardiovascular history: arrhythmia  Medications: a beta blocker  PHYSICAL EXAM: Baseline physical exam screening: normal and no wheezes audible  REST ECG: Atrial fibrillation  PROCEDURE: The study was performed in the the [address] [place] [address]  A regadenoson infusion pharmacologic stress test was performed  Treadmill exercise testing was performed, using the Chaparro protocol  Gated SPECT myocardial  perfusion imaging was performed after stress   Systolic blood pressure was 130 mmHg, at the start of the study  Diastolic blood pressure was 82 mmHg, at the start of the study  The heart rate was 96 bpm, at the start of the study  IV double  checked  GHASSAN PROTOCOL:  HR bpm SBP mmHg DBP mmHg Symptoms  Baseline 96 130 82 none  Stage 1 184 210 80 moderate dyspnea    Regadenoson protocol:  HR bpm SBP mmHg DBP mmHg Symptoms  Baseline 109 162 80 none  2 min 105 128 70 none  4 min 109 120 70 none    STRESS SUMMARY: Duration of pharmacologic stress was 3 min and 0 sec  Maximal heart rate during stress was 210 bpm ( 133 % of maximal predicted heart rate) with limited exercise and thus she was switched to a chemical stress test  The  rate-pressure product for the peak heart rate and blood pressure was 50692  There was no chest pain during stress  The stress test was terminated due to protocol completion  Pre oxygen saturation: 99 %  Peak oxygen saturation: 99 %  Arrhythmia during stress: atrial fibrillation  The stress ECG was non-diagnostic  ISOTOPE ADMINISTRATION:  Resting isotope administration Stress isotope administration  Agent Tetrofosmin Tetrofosmin  Dose 10 6 mCi 32 6 mCi  Date 03/11/2019 03/11/2019  Injection time 12:26 14:00  Injection-image interval 42 min 46 min    The radiopharmaceutical was injected at the peak effect of pharmacologic stress  MYOCARDIAL PERFUSION IMAGING:  The image quality was good  Left ventricular size was normal  The TID ratio was 0 99  PERFUSION DEFECTS:  -  There were no perfusion defects  GATED SPECT:  The calculated left ventricular ejection fraction was 78 %  There was no left ventricular regional abnormality  SUMMARY:  -  Stress results: Maximal heart rate during stress was 210 bpm ( 133 % of maximal predicted heart rate) with limited exercise and thus she was switched to a chemical stress test  Target heart rate was achieved  There was no chest pain  during stress  -  ECG conclusions: Arrhythmia during stress: atrial fibrillation   The stress ECG was non-diagnostic   -  Perfusion imaging: There were no perfusion defects   -  Gated SPECT: The calculated left ventricular ejection fraction was 78 %  There was no left ventricular regional abnormality  IMPRESSIONS: Normal study after pharmacologic vasodilation  Myocardial perfusion imaging was normal at rest and with stress  Prepared and signed by    Jimbo Anderson MD  Signed 03/11/2019 15:58:05             I reviewed and interpreted the following LABS/EKG/TELE/IMAGING and below is summary of my interpretation (if data available):    LABS:normal renal function  Normal lipids   Normal cbc    Current EKG and Rhythm Strip:Afib HR 79bpm    Past EKGs and RHYTHM strip: 1/28/19 NSR normal EKG after dccv

## 2022-08-01 ENCOUNTER — TELEPHONE (OUTPATIENT)
Dept: CARDIOLOGY CLINIC | Facility: CLINIC | Age: 67
End: 2022-08-01

## 2022-08-01 NOTE — TELEPHONE ENCOUNTER
Pt called to give Dr Nola Hurtado more info that she forgot to give at her mikaela't last week  She says "it's not an emergency, but call back either way"  Pt says she is holding off on the ablation for now, but says she gets a very occasional pinches in the center of her chest, which only last a couple of seconds  These don't happen at any particular time

## 2022-08-16 ENCOUNTER — OFFICE VISIT (OUTPATIENT)
Dept: SLEEP CENTER | Facility: CLINIC | Age: 67
End: 2022-08-16
Payer: COMMERCIAL

## 2022-08-16 VITALS
BODY MASS INDEX: 31.22 KG/M2 | HEIGHT: 68 IN | WEIGHT: 206 LBS | DIASTOLIC BLOOD PRESSURE: 60 MMHG | SYSTOLIC BLOOD PRESSURE: 110 MMHG

## 2022-08-16 DIAGNOSIS — G47.33 OSA (OBSTRUCTIVE SLEEP APNEA): Primary | ICD-10-CM

## 2022-08-16 PROCEDURE — 1160F RVW MEDS BY RX/DR IN RCRD: CPT | Performed by: INTERNAL MEDICINE

## 2022-08-16 PROCEDURE — 99213 OFFICE O/P EST LOW 20 MIN: CPT | Performed by: INTERNAL MEDICINE

## 2022-08-16 NOTE — PROGRESS NOTES
Progress Note - Sleep Center   Gracie Hillman FRASER: MRN: [id number]      Reason for Visit:  73 y  o female here for annual follow-up    Assessment:  Doing well on current therapy of APAP 4 to 10 cm for mild DOMENIC (AHI = 10 5 )  Plan:  Continue same  Consider F 30 interface    Follow up: One year    History of Present Illness:  History of DOMENIC on PAP therapy  Fully compliant and deriving benefit      Review of Systems      Genitourinary post menopausal (no peroids)   Cardiology Frequent chest pain or angina,  and ankle/leg swelling   Gastrointestinal none   Neurology frequent headaches   Constitutional fatigue   Integumentary rash or dry skin   Psychiatry none   Musculoskeletal none   Pulmonary shortness of breath with activity   ENT none   Endocrine none   Hematological none           I have reviewed and updated the review of systems as necessary      Historical Information    Past Medical History:   Past Medical History:   Diagnosis Date    Atrial fibrillation ([facility] ) 2019    Bipolar disorder ([facility] )     bipolar    Leg edema     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    Sleep apnea     Varicose veins of legs          Past Surgical History:   Past Surgical History:   Procedure Laterality Date    CARDIOVERSION       SECTION         Social History:   Social History     Socioeconomic History    Marital status: /Civil Union     Spouse name: Not on file    Number of children: 1    Years of education: 15    Highest education level: 12th grade   Occupational History    Occupation: unemployed   Tobacco Use    Smoking status: Former Smoker    Smokeless tobacco: Never Used   Vaping Use    Vaping Use: Never used   Substance and Sexual Activity    Alcohol use: No    Drug use: No    Sexual activity: Yes     Partners: Male   Other Topics Concern    Not on file   Social History Narrative    Education: high school graduate    Learning Disabilities: none    Marital History:     Children: 1 adult son    Living Arrangement: lives in home with     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, unemployed    Functioning Relationships: good support system,  is supportive    Legal History: none     History: None    Always uses seat belt      Social Determinants of Health     Financial Resource Strain: Medium Risk    Difficulty of Paying Living Expenses: Somewhat hard   Food Insecurity: No Food Insecurity    Worried About Running Out of Food in the Last Year: Never true    Lito of Food in the Last Year: Never true   Transportation Needs: No Transportation Needs    Lack of Transportation (Medical): No    Lack of Transportation (Non-Medical): No   Physical Activity: Sufficiently Active    Days of Exercise per Week: 7 days    Minutes of Exercise per Session: 60 min   Stress: No Stress Concern Present    Feeling of Stress : Only a little   Social Connections:  Moderately Isolated    Frequency of Communication with Friends and Family: Twice a week    Frequency of Social Gatherings with Friends and Family: Twice a week    Attends [employer] Services: Never    Active Member of Clubs or Organizations: No    Attends Club or Organization Meetings: Never    Marital Status:    Intimate Partner Violence: Not At Risk    Fear of Current or Ex-Partner: No    Emotionally Abused: No    Physically Abused: No    Sexually Abused: No   Housing Stability: [address] Unable to Pay for Housing in the Last Year: No    Number of [place] in the Last Year: 1    Unstable Housing in the Last Year: No       Family History:   Family History   Problem Relation Age of Onset    Psychiatric Illness Maternal Aunt     Breast cancer Maternal Aunt     Stroke Mother         CVA    Heart failure Mother     Diabetes Mother     Hypertension Mother     Thyroid disease Mother     Gallbladder disease Mother         gallstones    Diabetes Maternal Grandmother     No Known Problems Maternal Grandfather     No Known Problems Paternal Grandmother     No Known Problems Paternal Grandfather     No Known Problems Maternal Aunt     No Known Problems Maternal Aunt     Substance Abuse Neg Hx     Suicide Attempts Neg Hx        Medications/Allergies:      Current Outpatient Medications:     acetaminophen (TYLENOL) 325 mg tablet, Take 650 mg by mouth every 6 (six) hours as needed for mild pain, Disp: , Rfl:     apixaban (Eliquis) 5 mg, Take 1 tablet (5 mg total) by mouth 2 (two) times a day, Disp: 180 tablet, Rfl: 3    ascorbic acid (VITAMIN C) 500 mg tablet, Take 500 mg by mouth daily, Disp: , Rfl:     benztropine (COGENTIN) 1 mg tablet, Take 1 tablet (1 mg total) by mouth 2 (two) times a day, Disp: 180 tablet, Rfl: 2    divalproex sodium (DEPAKOTE ER) 500 mg 24 hr tablet, Take 1 tablet (500 mg total) by mouth every evening, Disp: 90 tablet, Rfl: 2    furosemide (LASIX) 20 mg tablet, TAKE 1 TABLET BY MOUTH EVERY DAY, Disp: 90 tablet, Rfl: 3    metoprolol tartrate (LOPRESSOR) 50 mg tablet, TAKE ONE TABLET BY MOUTH EVERY 12 HOURS, Disp: 180 tablet, Rfl: 0    Multiple Vitamin (MULTI-VITAMIN DAILY) TABS, Take 1 tablet by mouth daily, Disp: , Rfl:     potassium chloride (MICRO-K) 10 MEQ CR capsule, TAKE 1 CAPSULE BY MOUTH EVERY DAY, Disp: 90 capsule, Rfl: 3    risperiDONE (RisperDAL) 1 mg tablet, Take 1 tablet (1 mg total) by mouth every evening, Disp: 90 tablet, Rfl: 2          Objective      Vital Signs:   Vitals:    08/16/22 1504   BP: 110/60     [place] Sleepiness Scale: Total score: 9        Physical Exam:    General: Alert, appropriate, cooperative, overweight    Head: NC/AT    Skin: Warm, dry    Neuro: No motor abnormalities, cranial nerves appear intact    Extremity: No clubbing, cyanosis      DME Provider:   [employer]        Counseling / Coordination of Care   I have spent 20 minutes with the patient today in which greater than 50% of this time was spent in counseling/coordination of care regarding: equipment and compliance  Board Certified Sleep Specialist    Portions of the record may have been created with voice recognition software  Occasional wrong word or "sound a like" substitutions may have occurred due to the inherent limitations of voice recognition software  Read the chart carefully and recognize, using context, where substitutions have occurred

## 2022-08-17 ENCOUNTER — TELEPHONE (OUTPATIENT)
Dept: SLEEP CENTER | Facility: CLINIC | Age: 67
End: 2022-08-17

## 2022-09-01 ENCOUNTER — RA CDI HCC (OUTPATIENT)
Dept: OTHER | Facility: HOSPITAL | Age: 67
End: 2022-09-01

## 2022-09-01 NOTE — PROGRESS NOTES
[facility]em [facility]  coding opportunities       Chart reviewed, no opportunity found:   [address]        Patients Insurance     Medicare Insurance: [employer]

## 2022-09-07 ENCOUNTER — OFFICE VISIT (OUTPATIENT)
Dept: FAMILY MEDICINE CLINIC | Facility: CLINIC | Age: 67
End: 2022-09-07
Payer: COMMERCIAL

## 2022-09-07 VITALS
HEART RATE: 103 BPM | WEIGHT: 204.4 LBS | TEMPERATURE: 97.4 F | HEIGHT: 68 IN | SYSTOLIC BLOOD PRESSURE: 100 MMHG | BODY MASS INDEX: 30.98 KG/M2 | OXYGEN SATURATION: 99 % | DIASTOLIC BLOOD PRESSURE: 68 MMHG

## 2022-09-07 DIAGNOSIS — R73.03 PREDIABETES: ICD-10-CM

## 2022-09-07 DIAGNOSIS — R60.0 EDEMA OF BOTH LOWER LEGS: ICD-10-CM

## 2022-09-07 DIAGNOSIS — Z99.89 OSA ON CPAP: ICD-10-CM

## 2022-09-07 DIAGNOSIS — I48.19 OTHER PERSISTENT ATRIAL FIBRILLATION (HCC): Primary | ICD-10-CM

## 2022-09-07 DIAGNOSIS — F31.78 BIPOLAR I DISORDER, MOST RECENT EPISODE MIXED, IN FULL REMISSION (HCC): Chronic | ICD-10-CM

## 2022-09-07 DIAGNOSIS — G47.33 OSA ON CPAP: ICD-10-CM

## 2022-09-07 PROCEDURE — 99214 OFFICE O/P EST MOD 30 MIN: CPT | Performed by: PHYSICIAN ASSISTANT

## 2022-09-07 PROCEDURE — 3725F SCREEN DEPRESSION PERFORMED: CPT | Performed by: PHYSICIAN ASSISTANT

## 2022-09-07 PROCEDURE — 1160F RVW MEDS BY RX/DR IN RCRD: CPT | Performed by: PHYSICIAN ASSISTANT

## 2022-09-07 PROCEDURE — 3288F FALL RISK ASSESSMENT DOCD: CPT | Performed by: PHYSICIAN ASSISTANT

## 2022-09-07 PROCEDURE — 1101F PT FALLS ASSESS-DOCD LE1/YR: CPT | Performed by: PHYSICIAN ASSISTANT

## 2022-09-07 NOTE — PROGRESS NOTES
Assessment/Plan:     Diagnoses and all orders for this visit:    Other persistent atrial fibrillation ([facility] )  Comments:  Patient is rate controlled with metoprolol and on Eliquis for anticoagulation    DOMENIC on CPAP  Comments:  Patient follows with Sleep Center    Prediabetes  Comments: Follow low carb low sugar diet  Exercise encouraged  Bipolar I disorder, most recent episode mixed, in full remission ([facility] )  Comments:  Continue line patient will continue her current regimen  Patient follows with psychiatry    Edema of both lower legs  Comments:  Continue Lasix as directed per Cardiology  Elevate legs  Suggest compression stockings          Subjective:      Patient ID: Wayne Vasquez is a 73 y o  female  Patient presents in the office for follow up chronic conditions  Patient has AFib  She is on metoprolol ER 50 b i d  And Eliquis 5 mg b i d  She is rate controlled  Patient just saw her cardiologist   Patient also has obstructive sleep apnea  She is on CPAP  History of elevated fasting blood sugar  She has upcoming labs to do  Patient has bipolar disorder she is under care Psychiatry she is on Risperdal, Depakote and Cogentin  Patient has bilateral lower leg edema  She does have varicose veins so I suspect this is venous insufficiency  Cardiology has her on Lasix 20 mg with a potassium 10 mEq use  Patient can increase the Lasix according to her weight and her swelling        The following portions of the patient's history were reviewed and updated as appropriate:   She   Patient Active Problem List    Diagnosis Date Noted    Edema of both lower legs 09/07/2022    Prediabetes 10/26/2021    DOMENIC on CPAP     Screening for breast cancer 11/15/2018    Screen for colon cancer 11/15/2018    Hyponatremia 11/15/2018    Other persistent atrial fibrillation ([facility] ) 11/15/2018    Long-term use of high-risk medication 09/13/2018    Extrapyramidal reaction 05/02/2018    Bipolar I disorder, most recent episode mixed, in full remission ([facility] ) 06/24/2013     Current Outpatient Medications   Medication Sig Dispense Refill    acetaminophen (TYLENOL) 325 mg tablet Take 650 mg by mouth every 6 (six) hours as needed for mild pain      apixaban (Eliquis) 5 mg Take 1 tablet (5 mg total) by mouth 2 (two) times a day 180 tablet 3    ascorbic acid (VITAMIN C) 500 mg tablet Take 500 mg by mouth daily      benztropine (COGENTIN) 1 mg tablet Take 1 tablet (1 mg total) by mouth 2 (two) times a day 180 tablet 2    divalproex sodium (DEPAKOTE ER) 500 mg 24 hr tablet Take 1 tablet (500 mg total) by mouth every evening 90 tablet 2    furosemide (LASIX) 20 mg tablet TAKE 1 TABLET BY MOUTH EVERY DAY 90 tablet 3    metoprolol tartrate (LOPRESSOR) 50 mg tablet TAKE ONE TABLET BY MOUTH EVERY 12 HOURS 180 tablet 0    Multiple Vitamin (MULTI-VITAMIN DAILY) TABS Take 1 tablet by mouth daily      potassium chloride (MICRO-K) 10 MEQ CR capsule TAKE 1 CAPSULE BY MOUTH EVERY DAY 90 capsule 3    risperiDONE (RisperDAL) 1 mg tablet Take 1 tablet (1 mg total) by mouth every evening 90 tablet 2     No current facility-administered medications for this visit  She is allergic to levaquin [levofloxacin], ampicillin, and clindamycin       Review of Systems   Constitutional: Negative for activity change and unexpected weight change  HENT: Negative for ear pain and sore throat  Eyes: Negative for visual disturbance  Dry  eyes   Respiratory: Negative for cough, shortness of breath and wheezing  Cardiovascular: Positive for leg swelling  Negative for chest pain and palpitations  Gastrointestinal: Negative for abdominal pain, blood in stool, constipation, diarrhea, nausea and vomiting  Genitourinary: Negative for difficulty urinating  Musculoskeletal: Negative for arthralgias and myalgias  Skin: Negative for rash  Neurological: Negative for dizziness, syncope, light-headedness and headaches     Psychiatric/Behavioral: Negative for self-injury, sleep disturbance and suicidal ideas  The patient is not nervous/anxious  Objective:        Physical Exam  Vitals and nursing note reviewed  Constitutional:       General: She is not in acute distress  Appearance: She is well-developed  She is not diaphoretic  Comments: overweight   HENT:      Head: Normocephalic and atraumatic  Right Ear: Tympanic membrane, ear canal and external ear normal       Left Ear: Tympanic membrane, ear canal and external ear normal    Eyes:      Conjunctiva/sclera: Conjunctivae normal       Pupils: Pupils are equal, round, and reactive to light  Neck:      Thyroid: No thyromegaly  Vascular: No carotid bruit  Cardiovascular:      Rate and Rhythm: Normal rate  Rhythm irregular  Heart sounds: No murmur heard  No friction rub  No gallop  Pulmonary:      Effort: Pulmonary effort is normal  No respiratory distress  Breath sounds: Normal breath sounds  No wheezing  Abdominal:      General: Bowel sounds are normal  There is no distension  Palpations: Abdomen is soft  There is no mass  Tenderness: There is no abdominal tenderness  Musculoskeletal:      Right lower leg: No edema  Left lower leg: No edema  Lymphadenopathy:      Cervical: No cervical adenopathy  Skin:     General: Skin is warm and dry  Findings: No erythema or rash  Neurological:      General: No focal deficit present  Mental Status: She is alert and oriented to person, place, and time  Psychiatric:         Mood and Affect: Mood normal          Behavior: Behavior normal          Thought Content:  Thought content normal          Judgment: Judgment normal

## 2022-10-10 DIAGNOSIS — I48.91 ATRIAL FIBRILLATION WITH RVR (HCC): ICD-10-CM

## 2022-10-10 RX ORDER — METOPROLOL TARTRATE 50 MG/1
TABLET, FILM COATED ORAL
Qty: 180 TABLET | Refills: 0 | Status: SHIPPED | OUTPATIENT
Start: 2022-10-10

## 2022-11-02 ENCOUNTER — IMMUNIZATIONS (OUTPATIENT)
Dept: FAMILY MEDICINE CLINIC | Facility: CLINIC | Age: 67
End: 2022-11-02

## 2022-11-02 DIAGNOSIS — Z23 ENCOUNTER FOR IMMUNIZATION: Primary | ICD-10-CM

## 2022-11-16 ENCOUNTER — LAB (OUTPATIENT)
Dept: LAB | Facility: MEDICAL CENTER | Age: 67
End: 2022-11-16

## 2022-11-16 DIAGNOSIS — F31.78 BIPOLAR I DISORDER, MOST RECENT EPISODE MIXED, IN FULL REMISSION (HCC): ICD-10-CM

## 2022-11-16 DIAGNOSIS — Z79.899 ENCOUNTER FOR LONG-TERM (CURRENT) USE OF HIGH-RISK MEDICATION: ICD-10-CM

## 2022-11-16 LAB — VALPROATE SERPL-MCNC: 54 UG/ML (ref 50–100)

## 2022-11-22 ENCOUNTER — TELEPHONE (OUTPATIENT)
Dept: PSYCHIATRY | Facility: CLINIC | Age: 67
End: 2022-11-22

## 2022-11-22 ENCOUNTER — TELEPHONE (OUTPATIENT)
Dept: FAMILY MEDICINE CLINIC | Facility: CLINIC | Age: 67
End: 2022-11-22

## 2022-11-22 NOTE — TELEPHONE ENCOUNTER
Pt called to get her appt for 12/16 at 3:00p switched to a virtual visit due to not being able to make it in office at that time  Pt does not have [employer] and would like a link sent to [phone] 
Leonardo Monroy [phone]

## 2022-12-16 ENCOUNTER — TELEPHONE (OUTPATIENT)
Dept: PSYCHIATRY | Facility: CLINIC | Age: 67
End: 2022-12-16

## 2022-12-16 NOTE — TELEPHONE ENCOUNTER
Called and left voice mail for patient to call the office back and reschedule appt from 12/16 due to patient was unable to connect for virtual appt  Appt was cancelled per provider

## 2022-12-20 ENCOUNTER — TELEPHONE (OUTPATIENT)
Dept: PSYCHIATRY | Facility: CLINIC | Age: 67
End: 2022-12-20

## 2022-12-20 NOTE — TELEPHONE ENCOUNTER
Writer attempted to contact pt in regards to a vm we received in which pt stated that she wants to schedule an appt with provider   Digna

## 2023-01-12 DIAGNOSIS — R60.9 EDEMA: ICD-10-CM

## 2023-01-12 DIAGNOSIS — I48.91 ATRIAL FIBRILLATION WITH RVR (HCC): ICD-10-CM

## 2023-01-13 RX ORDER — METOPROLOL TARTRATE 50 MG/1
TABLET, FILM COATED ORAL
Qty: 180 TABLET | Refills: 3 | Status: SHIPPED | OUTPATIENT
Start: 2023-01-13

## 2023-01-13 RX ORDER — FUROSEMIDE 20 MG/1
TABLET ORAL
Qty: 90 TABLET | Refills: 2 | Status: SHIPPED | OUTPATIENT
Start: 2023-01-13

## 2023-01-13 RX ORDER — POTASSIUM CHLORIDE 750 MG/1
CAPSULE, EXTENDED RELEASE ORAL
Qty: 90 CAPSULE | Refills: 2 | Status: SHIPPED | OUTPATIENT
Start: 2023-01-13

## 2023-01-16 ENCOUNTER — TELEPHONE (OUTPATIENT)
Dept: FAMILY MEDICINE CLINIC | Facility: CLINIC | Age: 68
End: 2023-01-16

## 2023-01-17 ENCOUNTER — TELEPHONE (OUTPATIENT)
Dept: PSYCHIATRY | Facility: CLINIC | Age: 68
End: 2023-01-17

## 2023-01-17 NOTE — TELEPHONE ENCOUNTER
Patient called the office and left voice mail stating she had a medication question and would like a call back  Please review, thank you

## 2023-01-17 NOTE — TELEPHONE ENCOUNTER
Patient called and left a voice mail following up  She provided more information about the reason for call stating she has a cold and wants to know what she can take along with her medication she takes now  She requested a call back as soon as possible  Called patient to inform message was received and sent to nurse and provider

## 2023-01-17 NOTE — TELEPHONE ENCOUNTER
Spoke with Linda Ramírez - she was concerned about taking Coricidin with her medications  She was advised not to take Cogentin while on cold medications to avoid potential anticholinergic effects   She can take Depakote and Risperdal

## 2023-01-18 ENCOUNTER — TELEPHONE (OUTPATIENT)
Dept: CARDIOLOGY CLINIC | Facility: CLINIC | Age: 68
End: 2023-01-18

## 2023-01-18 NOTE — TELEPHONE ENCOUNTER
She can take over the counter medications for cold (Mucinex, delsym) I would just advise her to stay away from medications that say they have a decongestant as that can sometimes increase patient's heart rates

## 2023-01-23 ENCOUNTER — TELEPHONE (OUTPATIENT)
Dept: CARDIOLOGY CLINIC | Facility: CLINIC | Age: 68
End: 2023-01-23

## 2023-01-23 NOTE — TELEPHONE ENCOUNTER
Patient called to ask if it was ok for her to take and antihistamine for her runny nose  I told her that it would be ok but to stay away from any decongestant medication
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
## 2023-02-16 ENCOUNTER — OFFICE VISIT (OUTPATIENT)
Dept: PSYCHIATRY | Facility: CLINIC | Age: 68
End: 2023-02-16

## 2023-02-16 VITALS
WEIGHT: 205 LBS | HEIGHT: 68 IN | HEART RATE: 93 BPM | SYSTOLIC BLOOD PRESSURE: 115 MMHG | DIASTOLIC BLOOD PRESSURE: 71 MMHG | BODY MASS INDEX: 31.07 KG/M2

## 2023-02-16 DIAGNOSIS — F31.78 BIPOLAR I DISORDER, MOST RECENT EPISODE MIXED, IN FULL REMISSION (HCC): Primary | Chronic | ICD-10-CM

## 2023-02-16 DIAGNOSIS — Z79.899 LONG-TERM USE OF HIGH-RISK MEDICATION: Chronic | ICD-10-CM

## 2023-02-16 DIAGNOSIS — G25.9 EXTRAPYRAMIDAL REACTION: Chronic | ICD-10-CM

## 2023-02-16 RX ORDER — RISPERIDONE 1 MG/1
1 TABLET ORAL EVERY EVENING
Qty: 90 TABLET | Refills: 2 | Status: SHIPPED | OUTPATIENT
Start: 2023-02-16 | End: 2023-11-13

## 2023-02-16 RX ORDER — BENZTROPINE MESYLATE 1 MG/1
1 TABLET ORAL 2 TIMES DAILY
Qty: 180 TABLET | Refills: 2 | Status: SHIPPED | OUTPATIENT
Start: 2023-02-16 | End: 2023-11-13

## 2023-02-16 RX ORDER — DIVALPROEX SODIUM 500 MG/1
500 TABLET, EXTENDED RELEASE ORAL EVERY EVENING
Qty: 90 TABLET | Refills: 2 | Status: SHIPPED | OUTPATIENT
Start: 2023-02-16 | End: 2023-11-13

## 2023-02-16 NOTE — BH TREATMENT PLAN
TREATMENT PLAN (Medication Management Only)        [facility]    Name/Date of Birth/MRN:  Trent Sarkar 78 y o  1955 MRN: [id number]  Date of Treatment Plan: February 16, 2023  Diagnosis/Diagnoses:   1  Bipolar I disorder, most recent episode mixed, in full remission ([facility] [address] )    2  Extrapyramidal reaction    3  Long-term use of high-risk medication      Strengths/Personal Resources for Self-Care: "I am very careful to make sure I take my medications on time, get a good sleep and food"  Area/Areas of need (in own words): "making things work at home"  1  Long Term Goal:   alleviate anxiety, maintain mood stability  Target Date: 4 months - 6/16/2023  Person/Persons responsible for completion of goal: Shahida Atwood  2  Short Term Objective (s) - How will we reach this goal?:   A  Provider new recommended medication/dosage changes and/or continue medication(s): continue current medications as prescribed (Depakote ER, Risperdal and Cogentin)  B   N/A   C   N/A  Target Date: 4 months - 6/16/2023  Person/Persons Responsible for Completion of Goal: Shahida Atwood   Progress Towards Goals: stable  Treatment Modality: medication management every 4 months  Review due 180 days from date of this plan: 6 months - 8/16/2023  Expected length of service: maintenance unless revised  My Physician/PA/NP and I have developed this plan together and I agree to work on the goals and objectives  I understand the treatment goals that were developed for my treatment    Electronic Signatures: on file (unless signed below)    Hannah Hercules MD 02/16/23

## 2023-02-23 ENCOUNTER — OFFICE VISIT (OUTPATIENT)
Dept: CARDIOLOGY CLINIC | Facility: CLINIC | Age: 68
End: 2023-02-23

## 2023-02-23 VITALS
HEIGHT: 68 IN | SYSTOLIC BLOOD PRESSURE: 110 MMHG | BODY MASS INDEX: 31.16 KG/M2 | OXYGEN SATURATION: 99 % | WEIGHT: 205.6 LBS | DIASTOLIC BLOOD PRESSURE: 72 MMHG | HEART RATE: 102 BPM

## 2023-02-23 DIAGNOSIS — R60.9 EDEMA: ICD-10-CM

## 2023-02-23 DIAGNOSIS — I48.19 OTHER PERSISTENT ATRIAL FIBRILLATION (HCC): Primary | ICD-10-CM

## 2023-02-23 DIAGNOSIS — I50.32 CHRONIC DIASTOLIC CONGESTIVE HEART FAILURE (HCC): ICD-10-CM

## 2023-02-23 RX ORDER — POTASSIUM CHLORIDE 750 MG/1
10 CAPSULE, EXTENDED RELEASE ORAL 2 TIMES DAILY
Qty: 90 CAPSULE | Refills: 2 | Status: SHIPPED | OUTPATIENT
Start: 2023-02-23 | End: 2023-02-28 | Stop reason: SDUPTHER

## 2023-02-23 NOTE — PROGRESS NOTES
HEART  [facility]    Outpatient f/u  Today's Date: 02/23/23        Patient name: Sarai Dumont  YOB: 1955  Sex: female         Chief Complaint: f/u persistent afib      ASSESSMENT:  Problem List Items Addressed This Visit        Cardiovascular and Mediastinum    Other persistent atrial fibrillation [facility]) - Primary    Relevant Orders    POCT ECG     72 yo female  1) Persistent afib- Dx Nov 2018 found incidentaly by PCP then, tried DCCV Jan 2019 unclear how long she stayed in NSR after each DCCV   Her last DCCV took 4 shocks to restore NSR, 300J  Only mild LAE enlargement      She doesn't feel palpiations but notes SOB, worse edema, fatigue, decreased exercise tolerance and chest discomfort daily since dx w afib  Was planning abaltion but has insurance issues and we ended up opting for rate control        2) [id number]     3) Chronic LE edema, added lasix last visit and slightly improved but L>R edema  4) Bipolar on Risperadone and divalproex  QTc 435ms today    5) DOMENIC on CPAP, dx after afib  She reports compliance  6) She is requesting handicap plackard  PLAN:  1  She has been in afib over 5 years at least, EF remains normal  She would need antiarrhythmic that is QT prolonging and on respiridone  So will continue rate controle  2  Increase lasix to 20mg BID every other day since edema is worse  3  Cont eliquis     Orders Placed This Encounter   Procedures   • POCT ECG     There are no discontinued medications    HPI/Subjective:    72 yo female  1) Persistent afib- Dx Nov 2018 found incidentaly by PCP then, tried DCCV Jan 2019 unclear how long she stayed in NSR after each DCCV   Her last DCCV took 4 shocks to restore NSR, 300J   Only mild LAE enlargement      She doesn't feel palpiations but notes SOB, worse edema, fatigue, decreased exercise tolerance and chest discomfort daily since dx w afib  Was planning abaltion but has insurance issues        2) [id number]     3) Chronic LE edema, added lasix last visit and slightly improved  4) Bipolar on Risperadone and divalproex  QTc 435ms today    5) DOMENIC on CPAP, dx after afib  She reports compliance    Complete 12 point ROS reviewed and otherwise non pertinent or negative except as per HPI  Please see paper chart for outpatient clinic patients where the patient completed the 12 point ROS survey  Past Medical History:   Diagnosis Date   • [facility]   • B[facility] disorder ([facility] )     bipolar   • Cataracts, bilateral    • Leg edema    • 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   • Sleep apnea    • Varicose veins of legs        Allergies   Allergen Reactions   • Levaquin [Levofloxacin]      "heavy legs"   • Ampicillin Rash     Category: Allergy;    • Clindamycin Rash     Category: Allergy;      I reviewed the Home Medication list and Allergies in the chart     Scheduled Meds:  Current Outpatient Medications   Medication Sig Dispense Refill   • acetaminophen (TYLENOL) 325 mg tablet Take 650 mg by mouth every 6 (six) hours as needed for mild pain     • apixaban (Eliquis) 5 mg Take 1 tablet (5 mg total) by mouth 2 (two) times a day 180 tablet 3   • ascorbic acid (VITAMIN C) 500 mg tablet Take 500 mg by mouth daily     • benztropine (COGENTIN) 1 mg tablet Take 1 tablet (1 mg total) by mouth 2 (two) times a day 180 tablet 2   • divalproex sodium (DEPAKOTE ER) 500 mg 24 hr tablet Take 1 tablet (500 mg total) by mouth every evening 90 tablet 2   • furosemide (LASIX) 20 mg tablet TAKE ONE TABLET BY MOUTH EVERY DAY 90 tablet 2   • metoprolol tartrate (LOPRESSOR) 50 mg tablet TAKE ONE TABLET BY MOUTH EVERY 12 HOURS 180 tablet 3   • Multiple Vitamin (MULTI-VITAMIN DAILY) TABS Take 1 tablet by mouth daily     • potassium chloride (MICRO-K) 10 MEQ CR capsule TAKE ONE CAPSULE BY MOUTH EVERY DAY 90 capsule 2   • risperiDONE (RisperDAL) 1 mg tablet Take 1 tablet (1 mg total) by mouth every evening 90 tablet 2     No current facility-administered medications for this visit       PRN Meds:         Family History   Problem Relation Age of Onset   • Psychiatric Illness Maternal Aunt    • Breast cancer Maternal Aunt    • Stroke Mother         CVA   • Heart failure Mother    • Diabetes Mother    • Hypertension Mother    • Thyroid disease Mother    • Gallbladder disease Mother         gallstones   • Diabetes Maternal Grandmother    • No Known Problems Maternal Grandfather    • No Known Problems Paternal Grandmother    • No Known Problems Paternal Grandfather    • No Known Problems Maternal Aunt    • No Known Problems Maternal Aunt    • Substance Abuse Neg Hx    • Suicide Attempts Neg Hx        Social History     Socioeconomic History   • Marital status: /Civil Union     Spouse name: Not on file   • Number of children: 1   • Years of education: 12   • Highest education level: 12th grade   Occupational History   • Occupation: unemployed   Tobacco Use   • Smoking status: Former   • Smokeless tobacco: Never   Vaping Use   • Vaping Use: Never used   Substance and Sexual Activity   • Alcohol use: No   • Drug use: No   • Sexual activity: Yes     Partners: Male   Other Topics Concern   • Not on file   Social History Narrative    Education: high school graduate    Learning Disabilities: none    Marital History:     Children: 1 adult son    Living Arrangement: lives in home with     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, unemployed    Functioning Relationships: good support system,  is supportive    Legal History: none     History: None    Always uses seat belt      Social Determinants of Health     Financial Resource Strain: Medium Risk   • Difficulty of Paying Living Expenses: Somewhat hard   Food Insecurity: No Food Insecurity   • Worried About Running Out of Food in the Last Year: Never true   • Ran Out of Food in the Last Year: Never true   Transportation Needs: No Transportation Needs   • Lack of Transportation (Medical): No   • Lack of Transportation (Non-Medical): No   Physical Activity: Sufficiently Active   • Days of Exercise per Week: 7 days   • Minutes of Exercise per Session: 120 min   Stress: Stress Concern Present   • Feeling of Stress : To some extent   Social Connections:  Moderately Isolated   • Frequency of Communication with Friends and Family: Twice a week   • Frequency of Social Gatherings with Friends and Family: Twice a week   • Attends Yarsanism Services: Never   • Active Member of Clubs or Organizations: No   • Attends Club or Organization Meetings: Never   • Marital Status:    Intimate Partner Violence: Not At Risk   • Fear of Current or Ex-Partner: No   • Emotionally Abused: No   • Physically Abused: No   • Sexually Abused: No   Housing Stability: Low Risk    • Unable to Pay for Housing in the Last Year: No   • Number of Places Lived in the Last Year: 1   • Unstable Housing in the Last Year: No       OBJECTIVE:    /72 (BP Location: Left arm, Patient Position: Sitting, Cuff Size: Large)   Pulse 102   Ht 5' 8" (1 727 m)   Wt 93 3 kg (205 lb 9 6 oz)   SpO2 99%   BMI 31 26 kg/m²   Vitals:    02/23/23 1405   Weight: 93 3 kg (205 lb 9 6 oz)     /72 (BP Location: Left arm, Patient Position: Sitting, Cuff Size: Large)   Pulse 102   Ht 5' 8" (1 727 m)   Wt 93 3 kg (205 lb 9 6 oz)   SpO2 99%   BMI 31 26 kg/m²   Vitals:    02/23/23 1405   Weight: 93 3 kg (205 lb 9 6 oz)     /72 (BP Location: Left arm, Patient Position: Sitting, Cuff Size: Large)   Pulse 102   Ht 5' 8" (1 727 m)   Wt 93 3 kg (205 lb 9 6 oz)   SpO2 99%   BMI 31 26 kg/m²   Vitals:    02/23/23 1405   Weight: 93 3 kg (205 lb 9 6 oz)     /72 (BP Location: Left arm, Patient Position: Sitting, Cuff Size: Large)   Pulse 102   Ht 5' 8" (1 727 m) Wt 93 3 kg (205 lb 9 6 oz)   SpO2 99%   BMI 31 26 kg/m²   Vitals:    02/23/23 1405   Weight: 93 3 kg (205 lb 9 6 oz)     GEN: No acute distress, Alert and oriented, well appearing  HEENT:External ears normal, wearing a mask  EYES: Pupils equal, sclera anicteric, midline, normal conjuctiva  NECK: No JVD, supple, no obvious masses or thryomegaly or goiter  CARDIOVASCULAR:  RRR, No murmur, rub, gallops S1,S2  LUNGS: Clear To auscultation bilaterally, normal effort, no rales, rhonchi, crackles   ABDOMEN:  nondistended,  without obvious organomegaly or ascites  EXTREMITIES/VASCULAR:  No edema  warm an well perfused  PSYCH: Normal Affect,  linear speech pattern without evidence of psychosis  NEURO: Grossly intact, moving all extremiteis equal, face symmetric, alert and responsive, no obvious focal defecits   GAIT:  Ambulates normally without difficulty  HEME: No bleeding, bruising, petechia, purpura   SKIN: No significant rashes on visibile skin, warm, no diaphoresis or pallor                Lab Results:       LABS:      Chemistry        Component Value Date/Time     (L) 01/08/2016 1424    K 4 1 11/16/2022 1144    K 4 3 01/08/2016 1424    CL 98 11/16/2022 1144     01/08/2016 1424    CO2 28 11/16/2022 1144    CO2 30 01/08/2016 1424    BUN 18 11/16/2022 1144    BUN 16 01/08/2016 1424    CREATININE 0 68 11/16/2022 1144    CREATININE 0 71 01/08/2016 1424        Component Value Date/Time    CALCIUM 9 3 11/16/2022 1144    CALCIUM 8 9 01/08/2016 1424    ALKPHOS 55 11/16/2022 1144    ALKPHOS 59 01/08/2016 1424    AST 20 11/16/2022 1144    AST 15 01/08/2016 1424    ALT 20 11/16/2022 1144    ALT 19 01/08/2016 1424    BILITOT 0 56 01/08/2016 1424            No results found for: CHOL  Lab Results   Component Value Date    HDL 47 (L) 11/16/2022    HDL 47 (L) 03/04/2022    HDL 44 10/07/2021     Lab Results   Component Value Date    LDLCALC 86 11/16/2022    LDLCALC 106 (H) 03/04/2022    LDLCALC 110 (H) 10/07/2021 Lab Results   Component Value Date    TRIG 88 2022    TRIG 81 2022    TRIG 89 10/07/2021     No results found for: CHOLHDL    IMAGING: No results found  Cardiac testing:   Results for orders placed during the hospital encounter of 11/15/18   Echo complete with contrast if indicated    Narrative [address] [facility]  [phone]    Transthoracic Echocardiogram  2D, M-mode, Doppler, and Color Doppler    Study date:  2018    Patient: Prince Saez  MR number: [id number]  Account number: [de-identified]  : 1955  Age: 65 years  Gender: Female  Status: Inpatient  Location: Bedside  Height: 70 in  Weight: 194 7 lb  BP: 105/ 65 mmHg    Indications: Atrial fibrillation  Diagnoses: I48 0 - Atrial fibrillation    Sonographer:  Jayshree Reyes RDCS  Primary Physician:  Jody Barahona MD  Referring Physician:  Lazarus Duran, CRNP  Group:  [facility]  Interpreting Physician:  George Vasquez MD    SUMMARY    LEFT VENTRICLE:  Systolic function was normal  Ejection fraction was estimated to be 65 %  There were no regional wall motion abnormalities  LEFT ATRIUM:  The atrium was mildly dilated  RIGHT ATRIUM:  The atrium was mildly dilated  MITRAL VALVE:  There was mild annular calcification  There was trace regurgitation  TRICUSPID VALVE:  There was mild regurgitation  Pulmonary artery systolic pressure was within the normal range  HISTORY: PRIOR HISTORY: AFIB    PROCEDURE: The procedure was performed at the bedside  This was a routine study  The transthoracic approach was used  The study included complete 2D imaging, M-mode, complete spectral Doppler, and color Doppler  The heart rate was 50 bpm,  at the start of the study  Images were obtained from the parasternal, apical, subcostal, and suprasternal notch acoustic windows  Image quality was adequate      LEFT VENTRICLE: Size was normal  Systolic function was normal  Ejection fraction was estimated to be 65 %  There were no regional wall motion abnormalities  Wall thickness was normal  DOPPLER: Left ventricular diastolic function parameters  were abnormal  There was no evidence of elevated ventricular filling pressure by Doppler parameters  RIGHT VENTRICLE: The size was normal  Systolic function was normal  Wall thickness was normal     LEFT ATRIUM: The atrium was mildly dilated  RIGHT ATRIUM: The atrium was mildly dilated  MITRAL VALVE: There was mild annular calcification  There was mild diffuse thickening  There was normal leaflet separation  DOPPLER: The transmitral velocity was within the normal range  There was no evidence for stenosis  There was trace  regurgitation  AORTIC VALVE: The valve was trileaflet  Leaflets exhibited normal thickness, normal cuspal separation, and sclerosis  DOPPLER: Transaortic velocity was within the normal range  There was no evidence for stenosis  There was no significant  regurgitation  TRICUSPID VALVE: The valve structure was normal  There was normal leaflet separation  DOPPLER: The transtricuspid velocity was within the normal range  There was no evidence for stenosis  There was mild regurgitation  Pulmonary artery  systolic pressure was within the normal range  PULMONIC VALVE: Leaflets exhibited normal thickness, no calcification, and normal cuspal separation  DOPPLER: The transpulmonic velocity was within the normal range  There was no significant regurgitation  PERICARDIUM: There was no pericardial effusion  The pericardium was normal in appearance  AORTA: The root exhibited normal size  SYSTEMIC VEINS: IVC: The inferior vena cava was not well visualized  PULMONARY VEINS: DOPPLER: Doppler signals were not recordable in the pulmonary vein(s)      SYSTEM MEASUREMENT TABLES    2D  %FS: 33 42 %  AV Diam: 2 94 cm  EDV(Teich): 75 34 ml  EF(Teich): 62 58 %  ESV(Teich): 28 19 ml  IVSd: 0 68 cm  LA Area: 18 29 cm2  LA Diam: 2 84 cm  LVEDV MOD A4C: 94 08 ml  LVEF MOD A4C: 73 89 %  LVESV MOD A4C: 24 57 ml  LVIDd: 4 13 cm  LVIDs: 2 75 cm  LVLd A4C: 7 89 cm  LVLs A4C: 6 cm  LVPWd: 0 88 cm  RA Area: 20 69 cm2  RVIDd: 3 26 cm  SV MOD A4C: 69 51 ml  SV(Teich): 47 15 ml    CW  TR MaxP 84 mmHg  TR Vmax: 2 28 m/s    MM  TAPSE: 2 54 cm    [facility] Accredited Echocardiography Laboratory    Prepared and electronically signed by    Gloria Serrano MD  Signed [id number] 13:26:56       No results found for this or any previous visit  No results found for this or any previous visit  Results for orders placed during the hospital encounter of 19   NM myocardial perfusion spect (stress and/or rest)    [facility] [address], [address] [facility]  [phone]    Rest/Stress Gated SPECT Myocardial Perfusion Imaging After Regadenoson and Exercise    Patient: Angelika Richardson [facility]  MR number: [id number]  Account number: [de-identified]  : 1955  Age: 59 years  Gender: Female  Status: Outpatient  Location: [facility]  Height: 70 in  Weight: 197 lb  BP: 130/ 82 mmHg    Allergies: LEVOFLOXACIN, AMPICILLIN, CLINDAMYCIN    Diagnosis: I48 0 - Atrial fibrillation    Primary Physician:  Sheba Penn  RN:  Rusty Reyes RN  Referring Physician:  Gloria Serrano MD  Technician:  Odalis Saenz  Group:  [facility]  Report Prepared By[de-identified]  Rusty Reyes RN  Interpreting Physician:  Adia Blum MD    INDICATIONS: Coronary artery disease  HISTORY: The patient is a 59year old  female  Chest pain status: no chest pain  Other symptoms: dyspnea  Cardiovascular history: arrhythmia  Medications: a beta blocker  PHYSICAL EXAM: Baseline physical exam screening: normal and no wheezes audible  REST ECG: Atrial fibrillation  PROCEDURE: The study was performed in the the [facility]   A regadenoson infusion pharmacologic stress test was performed  Treadmill exercise testing was performed, using the Ghassan protocol  Gated SPECT myocardial  perfusion imaging was performed after stress  Systolic blood pressure was 130 mmHg, at the start of the study  Diastolic blood pressure was 82 mmHg, at the start of the study  The heart rate was 96 bpm, at the start of the study  IV double  checked  GHASSAN PROTOCOL:  HR bpm SBP mmHg DBP mmHg Symptoms  Baseline 96 130 82 none  Stage 1 184 210 80 moderate dyspnea    Regadenoson protocol:  HR bpm SBP mmHg DBP mmHg Symptoms  Baseline 109 162 80 none  2 min 105 128 70 none  4 min 109 120 70 none    STRESS SUMMARY: Duration of pharmacologic stress was 3 min and 0 sec  Maximal heart rate during stress was 210 bpm ( 133 % of maximal predicted heart rate) with limited exercise and thus she was switched to a chemical stress test  The  rate-pressure product for the peak heart rate and blood pressure was 27319  There was no chest pain during stress  The stress test was terminated due to protocol completion  Pre oxygen saturation: 99 %  Peak oxygen saturation: 99 %  Arrhythmia during stress: atrial fibrillation  The stress ECG was non-diagnostic  ISOTOPE ADMINISTRATION:  Resting isotope administration Stress isotope administration  Agent Tetrofosmin Tetrofosmin  Dose 10 6 mCi 32 6 mCi  Date 03/11/2019 03/11/2019  Injection time 12:26 14:00  Injection-image interval 42 min 46 min    The radiopharmaceutical was injected at the peak effect of pharmacologic stress  MYOCARDIAL PERFUSION IMAGING:  The image quality was good  Left ventricular size was normal  The TID ratio was 0 99  PERFUSION DEFECTS:  -  There were no perfusion defects  GATED SPECT:  The calculated left ventricular ejection fraction was 78 %  There was no left ventricular regional abnormality      SUMMARY:  -  Stress results: Maximal heart rate during stress was 210 bpm ( 133 % of maximal predicted heart rate) with limited exercise and thus she was switched to a chemical stress test  Target heart rate was achieved  There was no chest pain  during stress  -  ECG conclusions: Arrhythmia during stress: atrial fibrillation  The stress ECG was non-diagnostic   -  Perfusion imaging: There were no perfusion defects   -  Gated SPECT: The calculated left ventricular ejection fraction was 78 %  There was no left ventricular regional abnormality  IMPRESSIONS: Normal study after pharmacologic vasodilation  Myocardial perfusion imaging was normal at rest and with stress  Prepared and signed by    Nunu Thurston MD  Signed 03/11/2019 15:58:05             I reviewed and interpreted the following LABS/EKG/TELE/IMAGING and below is summary of my interpretation (if data available):    LABS:normal renal function  Normal lipids   Normal cbc    Current EKG and Rhythm Strip:Afib HR 102bpm    Past EKGs and RHYTHM strip: 1/28/19 NSR normal EKG after dccv

## 2023-02-28 DIAGNOSIS — R60.9 EDEMA: ICD-10-CM

## 2023-02-28 RX ORDER — POTASSIUM CHLORIDE 750 MG/1
10 CAPSULE, EXTENDED RELEASE ORAL 2 TIMES DAILY
Qty: 180 CAPSULE | Refills: 3 | Status: SHIPPED | OUTPATIENT
Start: 2023-02-28

## 2023-03-01 ENCOUNTER — TELEPHONE (OUTPATIENT)
Dept: CARDIOLOGY CLINIC | Facility: CLINIC | Age: 68
End: 2023-03-01

## 2023-03-01 NOTE — TELEPHONE ENCOUNTER
Pt called with a few questions-    Potassium prescription was sent in to pharmacy with a dosage increase to twice a day- was this due to the lasix increase to two times every other day? Can a new prescription be sent to pharmacy with new dosage of twice a day every other day? (if so I will send this in, just let me know)     After visit summary states "congestive heart failure" and she is wondering if this is due to swelling and her afib or if its just the start of congestive heart failure due to her afib? She is starting to have a little constipation- can she take miralax or something to fix this?

## 2023-03-16 DIAGNOSIS — R60.9 EDEMA: ICD-10-CM

## 2023-03-16 RX ORDER — FUROSEMIDE 20 MG/1
20 TABLET ORAL 2 TIMES DAILY
Qty: 90 TABLET | Refills: 3 | Status: SHIPPED | OUTPATIENT
Start: 2023-03-16 | End: 2023-03-22 | Stop reason: SDUPTHER

## 2023-03-20 ENCOUNTER — TELEPHONE (OUTPATIENT)
Dept: CARDIOLOGY CLINIC | Facility: CLINIC | Age: 68
End: 2023-03-20

## 2023-03-20 DIAGNOSIS — I48.91 ATRIAL FIBRILLATION WITH RVR (HCC): ICD-10-CM

## 2023-03-20 RX ORDER — APIXABAN 5 MG/1
TABLET, FILM COATED ORAL
Qty: 180 TABLET | Refills: 3 | Status: SHIPPED | OUTPATIENT
Start: 2023-03-20

## 2023-03-22 ENCOUNTER — TELEPHONE (OUTPATIENT)
Dept: CARDIOLOGY CLINIC | Facility: CLINIC | Age: 68
End: 2023-03-22

## 2023-03-22 DIAGNOSIS — R60.9 EDEMA: ICD-10-CM

## 2023-03-22 RX ORDER — FUROSEMIDE 20 MG/1
20 TABLET ORAL 2 TIMES DAILY
Qty: 180 TABLET | Refills: 3 | Status: SHIPPED | OUTPATIENT
Start: 2023-03-22

## 2023-03-22 NOTE — TELEPHONE ENCOUNTER
P/c'd about refills , but did not leave what meds she needed  Called her back to let her know several were sent to mail order, but if that is not what she is looking for to call us back

## 2023-03-30 NOTE — TELEPHONE ENCOUNTER
Due to patients health conditions with heart and not being able to take certain over the counter medications what could patient taker for a runny nose and cough  Patient tested negative for covid yesterday 
Pt informed
no chest pain and no edema.

## 2023-04-25 ENCOUNTER — APPOINTMENT (OUTPATIENT)
Dept: RADIOLOGY | Facility: MEDICAL CENTER | Age: 68
End: 2023-04-25

## 2023-04-25 ENCOUNTER — OFFICE VISIT (OUTPATIENT)
Dept: FAMILY MEDICINE CLINIC | Facility: CLINIC | Age: 68
End: 2023-04-25

## 2023-04-25 VITALS
SYSTOLIC BLOOD PRESSURE: 124 MMHG | DIASTOLIC BLOOD PRESSURE: 66 MMHG | OXYGEN SATURATION: 97 % | HEART RATE: 88 BPM | HEIGHT: 68 IN | BODY MASS INDEX: 31.95 KG/M2 | TEMPERATURE: 98.4 F | WEIGHT: 210.8 LBS

## 2023-04-25 DIAGNOSIS — I48.19 OTHER PERSISTENT ATRIAL FIBRILLATION (HCC): ICD-10-CM

## 2023-04-25 DIAGNOSIS — Z99.89 OSA ON CPAP: ICD-10-CM

## 2023-04-25 DIAGNOSIS — G89.29 CHRONIC PAIN OF LEFT KNEE: ICD-10-CM

## 2023-04-25 DIAGNOSIS — M25.562 CHRONIC PAIN OF LEFT KNEE: ICD-10-CM

## 2023-04-25 DIAGNOSIS — R73.03 PREDIABETES: ICD-10-CM

## 2023-04-25 DIAGNOSIS — G47.33 OSA ON CPAP: ICD-10-CM

## 2023-04-25 DIAGNOSIS — Z12.31 ENCOUNTER FOR SCREENING MAMMOGRAM FOR MALIGNANT NEOPLASM OF BREAST: ICD-10-CM

## 2023-04-25 DIAGNOSIS — F31.78 BIPOLAR I DISORDER, MOST RECENT EPISODE MIXED, IN FULL REMISSION (HCC): Chronic | ICD-10-CM

## 2023-04-25 DIAGNOSIS — Z00.00 MEDICARE ANNUAL WELLNESS VISIT, SUBSEQUENT: Primary | ICD-10-CM

## 2023-04-25 DIAGNOSIS — R60.0 EDEMA OF BOTH LOWER LEGS: ICD-10-CM

## 2023-04-25 DIAGNOSIS — Z78.0 POSTMENOPAUSAL: ICD-10-CM

## 2023-04-25 DIAGNOSIS — I50.32 CHRONIC DIASTOLIC CONGESTIVE HEART FAILURE (HCC): ICD-10-CM

## 2023-04-25 NOTE — PROGRESS NOTES
Assessment and Plan:     Problem List Items Addressed This Visit        Respiratory    DOMENIC on CPAP       Cardiovascular and Mediastinum    Other persistent atrial fibrillation (HCC)    Chronic diastolic congestive heart failure ([facility] [address] )       Other    Bipolar I disorder, most recent episode mixed, in full remission ([facility] [address] ) (Chronic)    Screening for breast cancer    Relevant Orders    Mammo screening bilateral w 3d & cad    Prediabetes    Relevant Orders    Hemoglobin A1C    Lipid panel    Edema of both lower legs   Other Visit Diagnoses     Medicare annual wellness visit, subsequent    -  Primary    Chronic pain of left knee        Check x-ray of left knee  Suspect osteoarthritis    Relevant Orders    XR knee 3 vw left non injury    Postmenopausal        Relevant Orders    DXA bone density spine hip and pelvis        BMI Counseling: Body mass index is 32 05 kg/m²  The BMI is above normal  Nutrition recommendations include decreasing portion sizes, encouraging healthy choices of fruits and vegetables, decreasing fast food intake and moderation in carbohydrate intake  Exercise recommendations include exercising 3-5 times per week  Rationale for BMI follow-up plan is due to patient being overweight or obese  Preventive health issues were discussed with patient, and age appropriate screening tests were ordered as noted in patient's After Visit Summary  Personalized health advice and appropriate referrals for health education or preventive services given if needed, as noted in patient's After Visit Summary  History of Present Illness:     Patient presents for a Medicare Wellness Visit    Patient presents in the office for follow-up chronic conditions  Patient has longstanding A-fib and CHF  He is currently on metoprolol 50 mg twice daily and Eliquis 5 mg twice daily  Taking Lasix and potassium every other day  Cardiology appointment is up-to-date  He also has obstructive sleep apnea on CPAP    3 of elevated fasting blood sugar  And is in the prediabetic range  She is due for labs  Follows with her psychiatrist for her bipolar disorder  Is currently maintained on Risperdal and Depakote  Patient Care Team:  Nohemy Sanz PA-C as PCP - General (Family Medicine)     Review of Systems:     Review of Systems   Constitutional: Negative for activity change and unexpected weight change  HENT: Negative for ear pain and sore throat  Eyes: Negative for visual disturbance  Respiratory: Positive for shortness of breath  Negative for cough and wheezing  Cardiovascular: Positive for leg swelling  Negative for chest pain  Gastrointestinal: Negative for abdominal pain, blood in stool, constipation, diarrhea, nausea and vomiting  Genitourinary: Negative for difficulty urinating  Musculoskeletal: Positive for arthralgias  Negative for myalgias  Left  Knee  Pain   Left  Lower  Leg  Pain  Skin: Negative for rash  Neurological: Positive for headaches  Negative for dizziness, syncope and light-headedness  Psychiatric/Behavioral: Negative for self-injury, sleep disturbance and suicidal ideas  The patient is not nervous/anxious           Problem List:     Patient Active Problem List   Diagnosis   • Bipolar I disorder, most recent episode mixed, in full remission ([facility] )   • Extrapyramidal reaction   • Long-term use of high-risk medication   • Screening for breast cancer   • Screen for colon cancer   • Hyponatremia   • Other persistent atrial fibrillation ([facility] )   • DOMENIC on CPAP   • Prediabetes   • Edema of both lower legs   • Chronic diastolic congestive heart failure [facility])      Past Medical and Surgical History:     Past Medical History:   Diagnosis Date   • Atrial fibrillation ([facility] ) 2019   • Bipolar disorder ([facility] )     bipolar   • Cataracts, bilateral    • Leg edema    • 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   • Sleep apnea    • Varicose veins of legs      Past Surgical History:   Procedure Laterality Date   • CARDIOVERSION     •  SECTION        Family History:     Family History   Problem Relation Age of Onset   • Psychiatric Illness Maternal Aunt    • Breast cancer Maternal Aunt    • Stroke Mother         CVA   • Heart failure Mother    • Diabetes Mother    • Hypertension Mother    • Thyroid disease Mother    • Gallbladder disease Mother         gallstones   • Diabetes Maternal Grandmother    • No Known Problems Maternal Grandfather    • No Known Problems Paternal Grandmother    • No Known Problems Paternal Grandfather    • No Known Problems Maternal Aunt    • No Known Problems Maternal Aunt    • Substance Abuse Neg Hx    • Suicide Attempts Neg Hx       Social History:     Social History     Socioeconomic History   • Marital status: /Civil Union     Spouse name: Not on file   • Number of children: 1   • Years of education: 12   • Highest education level: 12th grade   Occupational History   • Occupation: unemployed   Tobacco Use   • Smoking status: Former   • Smokeless tobacco: Never   Vaping Use   • Vaping Use: Never used   Substance and Sexual Activity   • Alcohol use: No   • Drug use: No   • Sexual activity: Yes     Partners: Male   Other Topics Concern   • Not on file   Social History Narrative    Education: high school graduate    Learning Disabilities: none    Marital History:     Children: 1 adult son    Living Arrangement: lives in home with     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, unemployed    Functioning Relationships: good support system,  is supportive    Legal History: none     History: None    Always uses seat belt      Social Determinants of Health     Financial Resource Strain: Medium Risk   • Difficulty of Paying Living Expenses: Somewhat hard   Food Insecurity: No Food Insecurity   • Worried About Running Out of Food in the Last Year: Never true   • Ran Out of Food in the Last Year: Never true   Transportation Needs: No Transportation Needs • Lack of Transportation (Medical): No   • Lack of Transportation (Non-Medical): No   Physical Activity: Sufficiently Active   • Days of Exercise per Week: 7 days   • Minutes of Exercise per Session: 120 min   Stress: Stress Concern Present   • Feeling of Stress : To some extent   Social Connections:  Moderately Isolated   • Frequency of Communication with Friends and Family: Twice a week   • Frequency of Social Gatherings with Friends and Family: Twice a week   • Attends Church Services: Never   • Active Member of Clubs or Organizations: No   • Attends Club or Organization Meetings: Never   • Marital Status:    Intimate Partner Violence: Not At Risk   • Fear of Current or Ex-Partner: No   • Emotionally Abused: No   • Physically Abused: No   • Sexually Abused: No   Housing Stability: Low Risk    • Unable to Pay for Housing in the Last Year: No   • Number of [place] in the Last Year: 1   • Unstable Housing in the Last Year: No      Medications and Allergies:     Current Outpatient Medications   Medication Sig Dispense Refill   • acetaminophen (TYLENOL) 325 mg tablet Take 650 mg by mouth every 6 (six) hours as needed for mild pain     • ascorbic acid (VITAMIN C) 500 mg tablet Take 500 mg by mouth daily     • benztropine (COGENTIN) 1 mg tablet Take 1 tablet (1 mg total) by mouth 2 (two) times a day 180 tablet 2   • divalproex sodium (DEPAKOTE ER) 500 mg 24 hr tablet Take 1 tablet (500 mg total) by mouth every evening 90 tablet 2   • Eliquis 5 MG TAKE ONE TABLET BY MOUTH TWICE A  tablet 3   • furosemide (LASIX) 20 mg tablet Take 1 tablet (20 mg total) by mouth 2 (two) times a day 180 tablet 3   • metoprolol tartrate (LOPRESSOR) 50 mg tablet TAKE ONE TABLET BY MOUTH EVERY 12 HOURS 180 tablet 3   • Multiple Vitamin (MULTI-VITAMIN DAILY) TABS Take 1 tablet by mouth daily     • potassium chloride (MICRO-K) 10 MEQ CR capsule Take 1 capsule (10 mEq total) by mouth 2 (two) times a day 180 capsule 3   • "risperiDONE (RisperDAL) 1 mg tablet Take 1 tablet (1 mg total) by mouth every evening 90 tablet 2     No current facility-administered medications for this visit  Allergies   Allergen Reactions   • Levaquin [Levofloxacin]      \"heavy legs\"   • Ampicillin Rash     Category: Allergy;    • Clindamycin Rash     Category: Allergy;       Immunizations:     Immunization History   Administered Date(s) Administered   • COVID-19 PFIZER VACCINE 0 3 ML IM 04/06/2021, 04/27/2021, 10/28/2021   • INFLUENZA 11/29/2012, 11/10/2014, 01/08/2016, 01/11/2017, 12/11/2017, 10/24/2018   • Influenza Quadrivalent Preservative Free 3 years and older IM 11/10/2014, 01/08/2016, 01/11/2017, 12/11/2017   • Influenza, high dose seasonal 0 7 mL 11/02/2022   • Influenza, recombinant, quadrivalent,injectable, preservative free 10/24/2018, 11/06/2019, 09/22/2020, 09/22/2021   • Influenza, seasonal, injectable 10/30/2013   • Influenza, seasonal, injectable, preservative free 11/29/2012   • Pneumococcal Conjugate Vaccine 20-valent (Pcv20), Polysace 04/20/2022   • Pneumococcal Polysaccharide PPV23 01/11/2017   • Tdap 09/20/2020      Health Maintenance:         Topic Date Due   • Colorectal Cancer Screening  Never done   • Breast Cancer Screening: Mammogram  06/13/2023   • Hepatitis C Screening  Completed     There are no preventive care reminders to display for this patient  Medicare Screening Tests and Risk Assessments:     Aiden Oconnell is here for her Subsequent Wellness visit  Health Risk Assessment:   Patient rates overall health as good  Patient feels that their physical health rating is same  Patient is satisfied with their life  Eyesight was rated as same  Hearing was rated as same  Patient feels that their emotional and mental health rating is same  Patients states they are never, rarely angry  Patient states they are often unusually tired/fatigued  Pain experienced in the last 7 days has been some  Patient's pain rating has been 3/10   Patient " states that she has experienced no weight loss or gain in last 6 months  Depression Screening:   PHQ-2 Score: 0      Fall Risk Screening: In the past year, patient has experienced: no history of falling in past year      Urinary Incontinence Screening:   Patient has not leaked urine accidently in the last six months  Home Safety:  Patient has trouble with stairs inside or outside of their home  Patient has working smoke alarms and has working carbon monoxide detector  Home safety hazards include: none  Nutrition:   Current diet is Regular  Medications:   Patient is currently taking over-the-counter supplements  OTC medications include: see medication list  Patient is able to manage medications  Activities of Daily Living (ADLs)/Instrumental Activities of Daily Living (IADLs):   Walk and transfer into and out of bed and chair?: Yes  Dress and groom yourself?: Yes    Bathe or shower yourself?: Yes    Feed yourself?  Yes  Do your laundry/housekeeping?: Yes  Manage your money, pay your bills and track your expenses?: Yes  Make your own meals?: Yes    Do your own shopping?: Yes    Previous Hospitalizations:   Any hospitalizations or ED visits within the last 12 months?: No      Advance Care Planning:   Living will: No      Cognitive Screening:   Provider or family/friend/caregiver concerned regarding cognition?: No    PREVENTIVE SCREENINGS      Cardiovascular Screening:    General: Screening Current      Diabetes Screening:     General: Screening Current      Breast Cancer Screening:     General: Screening Current      Cervical Cancer Screening:    General: Screening Not Indicated      Lung Cancer Screening:     General: Screening Not Indicated      Hepatitis C Screening:    General: Screening Current    Screening, Brief Intervention, and Referral to Treatment (SBIRT)    Screening      Single Item Drug Screening:  How often have you used an illegal drug (including marijuana) or a prescription "medication for non-medical reasons in the past year? never    Single Item Drug Screen Score: 0  Interpretation: Negative screen for possible drug use disorder    Brief Intervention  Alcohol & drug use screenings were reviewed  No concerns regarding substance use disorder identified  No results found  Physical Exam:     /66 (BP Location: Left arm, Patient Position: Sitting, Cuff Size: Large)   Pulse 88   Temp 98 4 °F (36 9 °C) (Temporal)   Ht 5' 8\" (1 727 m)   Wt 95 6 kg (210 lb 12 8 oz)   SpO2 97%   BMI 32 05 kg/m²     Physical Exam  Vitals and nursing note reviewed  Constitutional:       General: She is not in acute distress  Appearance: Normal appearance  She is well-developed  She is not diaphoretic  Comments: overweight   HENT:      Head: Normocephalic and atraumatic  Right Ear: External ear normal  There is impacted cerumen  Left Ear: Tympanic membrane, ear canal and external ear normal    Eyes:      Conjunctiva/sclera: Conjunctivae normal       Pupils: Pupils are equal, round, and reactive to light  Neck:      Thyroid: No thyromegaly  Vascular: No carotid bruit  Cardiovascular:      Rate and Rhythm: Normal rate  Rhythm irregular  Heart sounds: Normal heart sounds  No murmur heard  No friction rub  Pulmonary:      Effort: Pulmonary effort is normal  No respiratory distress  Breath sounds: Normal breath sounds  No wheezing  Abdominal:      General: Bowel sounds are normal  There is no distension  Palpations: Abdomen is soft  There is no mass  Tenderness: There is no abdominal tenderness  Musculoskeletal:      Right lower leg: Edema present  Left lower leg: Edema present  Comments: Patient has bilateral lower leg edema  Also has varicosities  Stasis changes  No open ulcerations  Calf tenderness    Left knee is larger than the right and it is tender to palpation daily and laterally   Lymphadenopathy:      Cervical: No " cervical adenopathy  Skin:     General: Skin is warm and dry  Neurological:      General: No focal deficit present  Mental Status: She is alert and oriented to person, place, and time  Psychiatric:         Mood and Affect: Mood normal          Behavior: Behavior normal          Thought Content:  Thought content normal          Judgment: Judgment normal           Tawny Tyler PA-C

## 2023-04-25 NOTE — PATIENT INSTRUCTIONS
Medicare Preventive Visit Patient Instructions  Thank you for completing your Welcome to Medicare Visit or Medicare Annual Wellness Visit today  Your next wellness visit will be due in one year (4/25/2024)  The screening/preventive services that you may require over the next 5-10 years are detailed below  Some tests may not apply to you based off risk factors and/or age  Screening tests ordered at today's visit but not completed yet may show as past due  Also, please note that scanned in results may not display below  Preventive Screenings:  Service Recommendations Previous Testing/Comments   Colorectal Cancer Screening  * Colonoscopy    * Fecal Occult Blood Test (FOBT)/Fecal Immunochemical Test (FIT)  * Fecal DNA/Cologuard Test  * Flexible Sigmoidoscopy Age: 39-70 years old   Colonoscopy: every 10 years (may be performed more frequently if at higher risk)  OR  FOBT/FIT: every 1 year  OR  Cologuard: every 3 years  OR  Sigmoidoscopy: every 5 years  Screening may be recommended earlier than age 39 if at higher risk for colorectal cancer  Also, an individualized decision between you and your healthcare provider will decide whether screening between the ages of 74-80 would be appropriate  Colonoscopy: Not on file  FOBT/FIT: Not on file  Cologuard: Not on file  Sigmoidoscopy: Not on file          Breast Cancer Screening Age: 36 years old  Frequency: every 1-2 years  Not required if history of left and right mastectomy Mammogram: 06/13/2022    Screening Current   Cervical Cancer Screening Between the ages of 21-29, pap smear recommended once every 3 years  Between the ages of 33-67, can perform pap smear with HPV co-testing every 5 years     Recommendations may differ for women with a history of total hysterectomy, cervical cancer, or abnormal pap smears in past  Pap Smear: Not on file    Screening Not Indicated   Hepatitis C Screening Once for adults born between 1945 and 1965  More frequently in patients at high risk for Hepatitis C Hep C Antibody: 01/22/2015    Screening Current   Diabetes Screening 1-2 times per year if you're at risk for diabetes or have pre-diabetes Fasting glucose: 86 mg/dL (11/16/2022)  A1C: 5 8 % (11/16/2022)  Screening Current   Cholesterol Screening Once every 5 years if you don't have a lipid disorder  May order more often based on risk factors  Lipid panel: 11/16/2022    Screening Current     Other Preventive Screenings Covered by Medicare:  1  Abdominal Aortic Aneurysm (AAA) Screening: covered once if your at risk  You're considered to be at risk if you have a family history of AAA  2  Lung Cancer Screening: covers low dose CT scan once per year if you meet all of the following conditions: (1) Age 50-69; (2) No signs or symptoms of lung cancer; (3) Current smoker or have quit smoking within the last 15 years; (4) You have a tobacco smoking history of at least 20 pack years (packs per day multiplied by number of years you smoked); (5) You get a written order from a healthcare provider  3  Glaucoma Screening: covered annually if you're considered high risk: (1) You have diabetes OR (2) Family history of glaucoma OR (3)  aged 48 and older OR (3)  American aged 72 and older  3  Osteoporosis Screening: covered every 2 years if you meet one of the following conditions: (1) You're estrogen deficient and at risk for osteoporosis based off medical history and other findings; (2) Have a vertebral abnormality; (3) On glucocorticoid therapy for more than 3 months; (4) Have primary hyperparathyroidism; (5) On osteoporosis medications and need to assess response to drug therapy  · Last bone density test (DXA Scan): Not on file  5  HIV Screening: covered annually if you're between the age of 12-76  Also covered annually if you are younger than 13 and older than 72 with risk factors for HIV infection   For pregnant patients, it is covered up to 3 times per pregnancy  Immunizations:  Immunization Recommendations   Influenza Vaccine Annual influenza vaccination during flu season is recommended for all persons aged >= 6 months who do not have contraindications   Pneumococcal Vaccine   * Pneumococcal conjugate vaccine = PCV13 (Prevnar 13), PCV15 (Vaxneuvance), PCV20 (Prevnar 20)  * Pneumococcal polysaccharide vaccine = PPSV23 (Pneumovax) Adults 25-60 years old: 1-3 doses may be recommended based on certain risk factors  Adults 72 years old: 1-2 doses may be recommended based off what pneumonia vaccine you previously received   Hepatitis B Vaccine 3 dose series if at intermediate or high risk (ex: diabetes, end stage renal disease, liver disease)   Tetanus (Td) Vaccine - COST NOT COVERED BY MEDICARE PART B Following completion of primary series, a booster dose should be given every 10 years to maintain immunity against tetanus  Td may also be given as tetanus wound prophylaxis  Tdap Vaccine - COST NOT COVERED BY MEDICARE PART B Recommended at least once for all adults  For pregnant patients, recommended with each pregnancy  Shingles Vaccine (Shingrix) - COST NOT COVERED BY MEDICARE PART B  2 shot series recommended in those aged 48 and above     Health Maintenance Due:      Topic Date Due   • Colorectal Cancer Screening  Never done   • Breast Cancer Screening: Mammogram  06/13/2023   • Hepatitis C Screening  Completed     Immunizations Due:  There are no preventive care reminders to display for this patient  Advance Directives   What are advance directives? Advance directives are legal documents that state your wishes and plans for medical care  These plans are made ahead of time in case you lose your ability to make decisions for yourself  Advance directives can apply to any medical decision, such as the treatments you want, and if you want to donate organs  What are the types of advance directives?   There are many types of advance directives, and each state has rules about how to use them  You may choose a combination of any of the following:  · Living will: This is a written record of the treatment you want  You can also choose which treatments you do not want, which to limit, and which to stop at a certain time  This includes surgery, medicine, IV fluid, and tube feedings  · Durable power of  for healthcare [facility]): This is a written record that states who you want to make healthcare choices for you when you are unable to make them for yourself  This person, called a proxy, is usually a family member or a friend  You may choose more than 1 proxy  · Do not resuscitate (DNR) order:  A DNR order is used in case your heart stops beating or you stop breathing  It is a request not to have certain forms of treatment, such as CPR  A DNR order may be included in other types of advance directives  · Medical directive: This covers the care that you want if you are in a coma, near death, or unable to make decisions for yourself  You can list the treatments you want for each condition  Treatment may include pain medicine, surgery, blood transfusions, dialysis, IV or tube feedings, and a ventilator (breathing machine)  · Values history: This document has questions about your views, beliefs, and how you feel and think about life  This information can help others choose the care that you would choose  Why are advance directives important? An advance directive helps you control your care  Although spoken wishes may be used, it is better to have your wishes written down  Spoken wishes can be misunderstood, or not followed  Treatments may be given even if you do not want them  An advance directive may make it easier for your family to make difficult choices about your care     Weight Management   Why it is important to manage your weight:  Being overweight increases your risk of health conditions such as heart disease, high blood pressure, type 2 diabetes, and certain types of cancer  It can also increase your risk for osteoarthritis, sleep apnea, and other respiratory problems  Aim for a slow, steady weight loss  Even a small amount of weight loss can lower your risk of health problems  How to lose weight safely:  A safe and healthy way to lose weight is to eat fewer calories and get regular exercise  You can lose up about 1 pound a week by decreasing the number of calories you eat by 500 calories each day  Healthy meal plan for weight management:  A healthy meal plan includes a variety of foods, contains fewer calories, and helps you stay healthy  A healthy meal plan includes the following:  · Eat whole-grain foods more often  A healthy meal plan should contain fiber  Fiber is the part of grains, fruits, and vegetables that is not broken down by your body  Whole-grain foods are healthy and provide extra fiber in your diet  Some examples of whole-grain foods are whole-wheat breads and pastas, oatmeal, brown rice, and bulgur  · Eat a variety of vegetables every day  Include dark, leafy greens such as spinach, kale, lee ann greens, and mustard greens  Eat yellow and orange vegetables such as carrots, sweet potatoes, and winter squash  · Eat a variety of fruits every day  Choose fresh or canned fruit (canned in its own juice or light syrup) instead of juice  Fruit juice has very little or no fiber  · Eat low-fat dairy foods  Drink fat-free (skim) milk or 1% milk  Eat fat-free yogurt and low-fat cottage cheese  Try low-fat cheeses such as mozzarella and other reduced-fat cheeses  · Choose meat and other protein foods that are low in fat  Choose beans or other legumes such as split peas or lentils  Choose fish, skinless poultry (chicken or turkey), or lean cuts of red meat (beef or pork)  Before you cook meat or poultry, cut off any visible fat  · Use less fat and oil  Try baking foods instead of frying them   Add less fat, such as margarine, sour cream, regular salad dressing and mayonnaise to foods  Eat fewer high-fat foods  Some examples of high-fat foods include french fries, doughnuts, ice cream, and cakes  · Eat fewer sweets  Limit foods and drinks that are high in sugar  This includes candy, cookies, regular soda, and sweetened drinks  Exercise:  Exercise at least 30 minutes per day on most days of the week  Some examples of exercise include walking, biking, dancing, and swimming  You can also fit in more physical activity by taking the stairs instead of the elevator or parking farther away from stores  Ask your healthcare provider about the best exercise plan for you  © Copyright [employer] 2018 Information is for End User's use only and may not be sold, redistributed or otherwise used for commercial purposes   All illustrations and images included in CareNotes® are the copyrighted property of [employer]  or [address] [place] [address] [facility]

## 2023-05-01 ENCOUNTER — PATIENT MESSAGE (OUTPATIENT)
Dept: FAMILY MEDICINE CLINIC | Facility: CLINIC | Age: 68
End: 2023-05-01

## 2023-05-01 DIAGNOSIS — G89.29 CHRONIC PAIN OF LEFT KNEE: Primary | ICD-10-CM

## 2023-05-01 DIAGNOSIS — M25.562 CHRONIC PAIN OF LEFT KNEE: Primary | ICD-10-CM

## 2023-05-16 ENCOUNTER — LAB (OUTPATIENT)
Dept: LAB | Facility: MEDICAL CENTER | Age: 68
End: 2023-05-16

## 2023-05-16 DIAGNOSIS — Z79.899 LONG-TERM USE OF HIGH-RISK MEDICATION: Chronic | ICD-10-CM

## 2023-05-16 DIAGNOSIS — F31.78 BIPOLAR I DISORDER, MOST RECENT EPISODE MIXED, IN FULL REMISSION (HCC): Chronic | ICD-10-CM

## 2023-05-16 LAB
ALBUMIN SERPL BCP-MCNC: 3.5 G/DL (ref 3.5–5)
ALP SERPL-CCNC: 51 U/L (ref 46–116)
ALT SERPL W P-5'-P-CCNC: 19 U/L (ref 12–78)
ANION GAP SERPL CALCULATED.3IONS-SCNC: 1 MMOL/L (ref 4–13)
AST SERPL W P-5'-P-CCNC: 19 U/L (ref 5–45)
BASOPHILS # BLD AUTO: 0.06 THOUSANDS/ÂΜL (ref 0–0.1)
BASOPHILS NFR BLD AUTO: 1 % (ref 0–1)
BILIRUB SERPL-MCNC: 0.86 MG/DL (ref 0.2–1)
BUN SERPL-MCNC: 15 MG/DL (ref 5–25)
CALCIUM SERPL-MCNC: 9.4 MG/DL (ref 8.3–10.1)
CHLORIDE SERPL-SCNC: 101 MMOL/L (ref 96–108)
CHOLEST SERPL-MCNC: 174 MG/DL
CO2 SERPL-SCNC: 30 MMOL/L (ref 21–32)
CREAT SERPL-MCNC: 0.72 MG/DL (ref 0.6–1.3)
EOSINOPHIL # BLD AUTO: 0.09 THOUSAND/ÂΜL (ref 0–0.61)
EOSINOPHIL NFR BLD AUTO: 1 % (ref 0–6)
ERYTHROCYTE [DISTWIDTH] IN BLOOD BY AUTOMATED COUNT: 13.9 % (ref 11.6–15.1)
EST. AVERAGE GLUCOSE BLD GHB EST-MCNC: 120 MG/DL
GFR SERPL CREATININE-BSD FRML MDRD: 87 ML/MIN/1.73SQ M
GLUCOSE P FAST SERPL-MCNC: 77 MG/DL (ref 65–99)
HBA1C MFR BLD: 5.8 %
HCT VFR BLD AUTO: 39.7 % (ref 34.8–46.1)
HDLC SERPL-MCNC: 55 MG/DL
HGB BLD-MCNC: 12.4 G/DL (ref 11.5–15.4)
IMM GRANULOCYTES # BLD AUTO: 0.02 THOUSAND/UL (ref 0–0.2)
IMM GRANULOCYTES NFR BLD AUTO: 0 % (ref 0–2)
LDLC SERPL CALC-MCNC: 103 MG/DL (ref 0–100)
LYMPHOCYTES # BLD AUTO: 1.61 THOUSANDS/ÂΜL (ref 0.6–4.47)
LYMPHOCYTES NFR BLD AUTO: 26 % (ref 14–44)
MCH RBC QN AUTO: 28.5 PG (ref 26.8–34.3)
MCHC RBC AUTO-ENTMCNC: 31.2 G/DL (ref 31.4–37.4)
MCV RBC AUTO: 91 FL (ref 82–98)
MONOCYTES # BLD AUTO: 0.56 THOUSAND/ÂΜL (ref 0.17–1.22)
MONOCYTES NFR BLD AUTO: 9 % (ref 4–12)
NEUTROPHILS # BLD AUTO: 3.9 THOUSANDS/ÂΜL (ref 1.85–7.62)
NEUTS SEG NFR BLD AUTO: 63 % (ref 43–75)
NONHDLC SERPL-MCNC: 119 MG/DL
NRBC BLD AUTO-RTO: 0 /100 WBCS
PLATELET # BLD AUTO: 206 THOUSANDS/UL (ref 149–390)
PMV BLD AUTO: 11.2 FL (ref 8.9–12.7)
POTASSIUM SERPL-SCNC: 4.3 MMOL/L (ref 3.5–5.3)
PROT SERPL-MCNC: 7.2 G/DL (ref 6.4–8.4)
RBC # BLD AUTO: 4.35 MILLION/UL (ref 3.81–5.12)
SODIUM SERPL-SCNC: 132 MMOL/L (ref 135–147)
TRIGL SERPL-MCNC: 80 MG/DL
VALPROATE SERPL-MCNC: 62 UG/ML (ref 50–100)
WBC # BLD AUTO: 6.24 THOUSAND/UL (ref 4.31–10.16)

## 2023-05-17 NOTE — RESULT ENCOUNTER NOTE
Letter was sent to Eben Mcknight to follow up with PCP for low sodium, elevated hemoglobin A1C and lipid profile    Depakote level is therapeutic at 62

## 2023-06-07 ENCOUNTER — TELEPHONE (OUTPATIENT)
Dept: CARDIOLOGY CLINIC | Facility: CLINIC | Age: 68
End: 2023-06-07

## 2023-06-07 NOTE — TELEPHONE ENCOUNTER
Patient call asking if she needs to hold [facility] for dental fillings tomorrow? I told her no need to hold for fillings  She also asked if she needs to hold for cleanings, root canal or extractions as she may need both in the near future  Letter needed for dental office, which I can certainly do, fax [phone]  Dr Anita Tong
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
## 2023-06-21 ENCOUNTER — OFFICE VISIT (OUTPATIENT)
Dept: PSYCHIATRY | Facility: CLINIC | Age: 68
End: 2023-06-21
Payer: COMMERCIAL

## 2023-06-21 VITALS
SYSTOLIC BLOOD PRESSURE: 108 MMHG | HEIGHT: 68 IN | DIASTOLIC BLOOD PRESSURE: 69 MMHG | BODY MASS INDEX: 31.52 KG/M2 | HEART RATE: 89 BPM | WEIGHT: 208 LBS

## 2023-06-21 DIAGNOSIS — F31.78 BIPOLAR I DISORDER, MOST RECENT EPISODE MIXED, IN FULL REMISSION (HCC): Primary | Chronic | ICD-10-CM

## 2023-06-21 DIAGNOSIS — Z79.899 LONG-TERM USE OF HIGH-RISK MEDICATION: Chronic | ICD-10-CM

## 2023-06-21 DIAGNOSIS — G25.9 EXTRAPYRAMIDAL REACTION: Chronic | ICD-10-CM

## 2023-06-21 PROCEDURE — 90833 PSYTX W PT W E/M 30 MIN: CPT | Performed by: PSYCHIATRY & NEUROLOGY

## 2023-06-21 PROCEDURE — 99213 OFFICE O/P EST LOW 20 MIN: CPT | Performed by: PSYCHIATRY & NEUROLOGY

## 2023-06-21 RX ORDER — DIVALPROEX SODIUM 500 MG/1
500 TABLET, EXTENDED RELEASE ORAL EVERY EVENING
Qty: 90 TABLET | Refills: 2 | Status: SHIPPED | OUTPATIENT
Start: 2023-06-21 | End: 2024-03-17

## 2023-06-21 RX ORDER — RISPERIDONE 1 MG/1
1 TABLET ORAL EVERY EVENING
Qty: 90 TABLET | Refills: 2 | Status: SHIPPED | OUTPATIENT
Start: 2023-06-21 | End: 2024-03-17

## 2023-06-21 RX ORDER — BENZTROPINE MESYLATE 1 MG/1
1 TABLET ORAL 2 TIMES DAILY
Qty: 180 TABLET | Refills: 2 | Status: SHIPPED | OUTPATIENT
Start: 2023-06-21 | End: 2024-03-17

## 2023-06-21 NOTE — BH TREATMENT PLAN
"TREATMENT PLAN (Medication Management Only)        [facility]    Name/Date of Birth/MRN:  Gardenia Lerner 78 y o  1955 MRN: [id number]  Date of Treatment Plan: June 21, 2023  Diagnosis/Diagnoses:   1  Bipolar I disorder, most recent episode mixed, in full remission ([facility] [address] )    2  Extrapyramidal reaction    3  Long-term use of high-risk medication      Strengths/Personal Resources for Self-Care: \"trying to take care of myself high on the list, sleep\"  Area/Areas of need (in own words): \"getting more done\"  1  Long Term Goal:   maintain control of anxiety, maintain mood stability  Target Date: 4 months - 10/21/2023  Person/Persons responsible for completion of goal: [place]  2  Short Term Objective (s) - How will we reach this goal?:   A  Provider new recommended medication/dosage changes and/or continue medication(s): continue current medications as prescribed (Depakote ER, Risperdal and Cogentin)  B   N/A   C   N/A  Target Date: 4 months - 10/21/2023  Person/Persons Responsible for Completion of Goal: [place]   Progress Towards Goals: stable  Treatment Modality: medication management every 4 months  Review due 180 days from date of this plan: 6 months - 12/21/2023  Expected length of service: maintenance unless revised  My Physician/PA/NP and I have developed this plan together and I agree to work on the goals and objectives  I understand the treatment goals that were developed for my treatment    Electronic Signatures: on file (unless signed below)    Elaine Castro MD 06/21/23  "

## 2023-07-20 ENCOUNTER — APPOINTMENT (OUTPATIENT)
Dept: LAB | Facility: CLINIC | Age: 68
End: 2023-07-20
Payer: COMMERCIAL

## 2023-07-20 ENCOUNTER — TELEPHONE (OUTPATIENT)
Dept: CARDIOLOGY CLINIC | Facility: CLINIC | Age: 68
End: 2023-07-20

## 2023-07-20 DIAGNOSIS — R60.0 LOCALIZED EDEMA: ICD-10-CM

## 2023-07-20 DIAGNOSIS — R60.0 LOCALIZED EDEMA: Primary | ICD-10-CM

## 2023-07-20 LAB
ANION GAP SERPL CALCULATED.3IONS-SCNC: 7 MMOL/L
BNP SERPL-MCNC: 262 PG/ML (ref 0–100)
BUN SERPL-MCNC: 19 MG/DL (ref 5–25)
CALCIUM SERPL-MCNC: 9.3 MG/DL (ref 8.4–10.2)
CHLORIDE SERPL-SCNC: 100 MMOL/L (ref 96–108)
CO2 SERPL-SCNC: 31 MMOL/L (ref 21–32)
CREAT SERPL-MCNC: 0.65 MG/DL (ref 0.6–1.3)
GFR SERPL CREATININE-BSD FRML MDRD: 92 ML/MIN/1.73SQ M
GLUCOSE SERPL-MCNC: 85 MG/DL (ref 65–140)
POTASSIUM SERPL-SCNC: 3.7 MMOL/L (ref 3.5–5.3)
SODIUM SERPL-SCNC: 138 MMOL/L (ref 135–147)

## 2023-07-20 PROCEDURE — 83880 ASSAY OF NATRIURETIC PEPTIDE: CPT

## 2023-07-20 PROCEDURE — 80048 BASIC METABOLIC PNL TOTAL CA: CPT

## 2023-07-20 PROCEDURE — 36415 COLL VENOUS BLD VENIPUNCTURE: CPT

## 2023-07-20 NOTE — TELEPHONE ENCOUNTER
Patient states that her GIANNA has decreased on the alternating dose of Lasix 20mg/40mg. However, she states that has now gone back to q.d. dosing of Lasix and at 20mg and 10mEq of K. She would like to know if ok to increase the dose as needed for her edema.

## 2023-07-21 ENCOUNTER — TELEPHONE (OUTPATIENT)
Dept: CARDIOLOGY CLINIC | Facility: CLINIC | Age: 68
End: 2023-07-21

## 2023-07-21 NOTE — TELEPHONE ENCOUNTER
----- Message from Can Banegas MD sent at 7/21/2023  8:32 AM EDT -----  Michael childress labs look good and increased lasix as we discussed is a good idea.

## 2023-07-21 NOTE — TELEPHONE ENCOUNTER
----- Message from Molly Renteria MD sent at 7/21/2023  8:32 AM EDT -----  Tiffanie Avila know labs look good and increased lasix as we discussed is a good idea.

## 2023-08-15 NOTE — TELEPHONE ENCOUNTER
She is aware and will have have labs done. Oral Minoxidil Pregnancy And Lactation Text: This medication should only be used when clearly needed if you are pregnant, attempting to become pregnant or breast feeding.

## 2023-10-18 DIAGNOSIS — I48.91 ATRIAL FIBRILLATION WITH RVR (HCC): ICD-10-CM

## 2023-10-18 RX ORDER — METOPROLOL TARTRATE 50 MG/1
50 TABLET, FILM COATED ORAL EVERY 12 HOURS
Qty: 28 TABLET | Refills: 0 | Status: SHIPPED | OUTPATIENT
Start: 2023-10-18 | End: 2024-10-12

## 2023-10-18 RX ORDER — METOPROLOL TARTRATE 50 MG/1
50 TABLET, FILM COATED ORAL EVERY 12 HOURS
Qty: 180 TABLET | Refills: 3 | Status: SHIPPED | OUTPATIENT
Start: 2023-10-18 | End: 2023-10-18 | Stop reason: SDUPTHER

## 2023-10-23 ENCOUNTER — TELEPHONE (OUTPATIENT)
Age: 68
End: 2023-10-23

## 2023-10-30 DIAGNOSIS — I48.91 ATRIAL FIBRILLATION WITH RVR (HCC): ICD-10-CM

## 2023-10-30 RX ORDER — METOPROLOL TARTRATE 50 MG/1
50 TABLET, FILM COATED ORAL EVERY 12 HOURS
Qty: 180 TABLET | Refills: 3 | Status: SHIPPED | OUTPATIENT
Start: 2023-10-30 | End: 2024-10-24

## 2023-10-30 NOTE — TELEPHONE ENCOUNTER
Requested medication(s) are due for refill today: Yes  Patient has already received a courtesy refill: No  Other reason request has been forwarded to provider: needs refill

## 2023-11-16 ENCOUNTER — RA CDI HCC (OUTPATIENT)
Dept: OTHER | Facility: HOSPITAL | Age: 68
End: 2023-11-16

## 2023-11-16 NOTE — PROGRESS NOTES
[address] coding opportunities       Chart reviewed, no opportunity found: [address] Review     Patients Insurance     Medicare Insurance: [employer]

## 2023-11-27 ENCOUNTER — OFFICE VISIT (OUTPATIENT)
Dept: FAMILY MEDICINE CLINIC | Facility: CLINIC | Age: 68
End: 2023-11-27
Payer: COMMERCIAL

## 2023-11-27 ENCOUNTER — RA CDI HCC (OUTPATIENT)
Dept: OTHER | Facility: HOSPITAL | Age: 68
End: 2023-11-27

## 2023-11-27 VITALS
SYSTOLIC BLOOD PRESSURE: 138 MMHG | HEIGHT: 68 IN | WEIGHT: 218 LBS | OXYGEN SATURATION: 98 % | RESPIRATION RATE: 16 BRPM | BODY MASS INDEX: 33.04 KG/M2 | DIASTOLIC BLOOD PRESSURE: 80 MMHG | HEART RATE: 95 BPM | TEMPERATURE: 97.5 F

## 2023-11-27 DIAGNOSIS — I48.19 OTHER PERSISTENT ATRIAL FIBRILLATION (HCC): ICD-10-CM

## 2023-11-27 DIAGNOSIS — F31.78 BIPOLAR I DISORDER, MOST RECENT EPISODE MIXED, IN FULL REMISSION (HCC): Chronic | ICD-10-CM

## 2023-11-27 DIAGNOSIS — G47.33 OSA ON CPAP: ICD-10-CM

## 2023-11-27 DIAGNOSIS — I50.32 CHRONIC DIASTOLIC CONGESTIVE HEART FAILURE (HCC): Primary | ICD-10-CM

## 2023-11-27 DIAGNOSIS — R73.03 PREDIABETES: ICD-10-CM

## 2023-11-27 PROCEDURE — 99214 OFFICE O/P EST MOD 30 MIN: CPT | Performed by: PHYSICIAN ASSISTANT

## 2023-11-27 NOTE — PROGRESS NOTES
Assessment/Plan:     Diagnoses and all orders for this visit:    Chronic diastolic congestive heart failure ([address])  Comments: This appears to be well compensated continue potassium and Lasix as directed    Other persistent atrial fibrillation (HCC)  Comments:  Rate controlled and on Eliquis  Orders:  -     Comprehensive metabolic panel    DOMENIC on CPAP    Prediabetes  Comments:  Carb low sugar diet. Exercise will help  Orders:  -     Comprehensive metabolic panel  -     Lipid panel  -     Hemoglobin A1C    Bipolar I disorder, most recent episode mixed, in full remission ([address])  Comments:  Meds per psychiatry. Patient currently appears stable  Orders:  -     CBC and differential          Subjective:      Patient ID: Anderson Carias is a 78 y.o. female. Patient presents in the office for follow-up chronic conditions. Patient has A-fib and a history of congestive heart failure. Does follow with her cardiologist.  Her current regimen includes Eliquis 5 mg twice daily and metoprolol 50 mg twice daily. Also takes Lasix 20 mg twice a day and potassium 10 mEq daily. Patient has obstructive sleep apnea on CPAP. Is overweight she has gained 10 pounds since her last visit. She also has a history of elevated fasting glucose. Due for routine labs. She is seeing psychiatry for bipolar disorder. She is currently on Depakote  mg daily, Cogentin 1 mg twice daily. And respite all 1 mg.   Patient also due for DEXA scan and mammogram.        The following portions of the patient's history were reviewed and updated as appropriate: She   Patient Active Problem List    Diagnosis Date Noted    Chronic diastolic congestive heart failure ([address]) 02/23/2023    Edema of both lower legs 09/07/2022    Prediabetes 10/26/2021    DOMENIC on CPAP     Screening for breast cancer 11/15/2018    Screen for colon cancer 11/15/2018    Hyponatremia 11/15/2018    Other persistent atrial fibrillation ([address]) 11/15/2018    Long-term use of high-risk medication 09/13/2018    Extrapyramidal reaction 05/02/2018    Bipolar I disorder, most recent episode mixed, in full remission ([address]) 06/24/2013     Current Outpatient Medications   Medication Sig Dispense Refill    acetaminophen (TYLENOL) 325 mg tablet Take 650 mg by mouth every 6 (six) hours as needed for mild pain      ascorbic acid (VITAMIN C) 500 mg tablet Take 500 mg by mouth daily      benztropine (COGENTIN) 1 mg tablet Take 1 tablet (1 mg total) by mouth 2 (two) times a day 180 tablet 2    divalproex sodium (DEPAKOTE ER) 500 mg 24 hr tablet Take 1 tablet (500 mg total) by mouth every evening 90 tablet 2    Eliquis 5 MG TAKE ONE TABLET BY MOUTH TWICE A  tablet 3    furosemide (LASIX) 20 mg tablet Take 1 tablet (20 mg total) by mouth 2 (two) times a day 180 tablet 3    metoprolol tartrate (LOPRESSOR) 50 mg tablet Take 1 tablet (50 mg total) by mouth every 12 (twelve) hours 180 tablet 3    Multiple Vitamin (MULTI-VITAMIN DAILY) TABS Take 1 tablet by mouth daily      potassium chloride (MICRO-K) 10 MEQ CR capsule Take 1 capsule (10 mEq total) by mouth 2 (two) times a day 180 capsule 3    risperiDONE (RisperDAL) 1 mg tablet Take 1 tablet (1 mg total) by mouth every evening 90 tablet 2     No current facility-administered medications for this visit. She is allergic to levaquin [levofloxacin], ampicillin, and clindamycin. .    Review of Systems   Constitutional:  Negative for activity change, appetite change and unexpected weight change. HENT:  Negative for ear pain and sore throat. Eyes:  Negative for visual disturbance. Respiratory:  Negative for cough, shortness of breath and wheezing. Cardiovascular:  Positive for palpitations. Negative for chest pain and leg swelling. Gastrointestinal:  Negative for abdominal pain, blood in stool, constipation, diarrhea, nausea and vomiting. Genitourinary:  Negative for difficulty urinating. Musculoskeletal:  Negative for arthralgias and myalgias. Skin:  Negative for rash. Neurological:  Positive for headaches. Negative for dizziness, seizures, syncope and light-headedness. Psychiatric/Behavioral:  Negative for self-injury, sleep disturbance and suicidal ideas. The patient is not nervous/anxious. Objective:        Physical Exam  Vitals and nursing note reviewed. Constitutional:       General: She is not in acute distress. Appearance: She is well-developed. She is obese. She is not diaphoretic. HENT:      Head: Normocephalic and atraumatic. Right Ear: Tympanic membrane, ear canal and external ear normal.      Left Ear: Tympanic membrane, ear canal and external ear normal.      Mouth/Throat:      Pharynx: No posterior oropharyngeal erythema. Eyes:      Conjunctiva/sclera: Conjunctivae normal.      Pupils: Pupils are equal, round, and reactive to light. Neck:      Thyroid: No thyromegaly. Vascular: No carotid bruit. Cardiovascular:      Rate and Rhythm: Normal rate. Rhythm irregular. Heart sounds: No murmur heard. No friction rub. No gallop. Comments: Afib   rate  controlled and  on  eliquis  Pulmonary:      Effort: Pulmonary effort is normal. No respiratory distress. Breath sounds: Normal breath sounds. No wheezing. Abdominal:      General: Abdomen is protuberant. Bowel sounds are normal. There is no distension. Palpations: Abdomen is soft. There is no mass. Tenderness: There is no abdominal tenderness. Musculoskeletal:      Right lower leg: No edema. Left lower leg: No edema. Comments: Chronic  venous  stasis  skin  changes  right  and  left  lower  legs. Lymphadenopathy:      Cervical: No cervical adenopathy. Skin:     General: Skin is warm and dry. Findings: No erythema or rash. Neurological:      General: No focal deficit present. Mental Status: She is alert and oriented to person, place, and time.    Psychiatric:         Mood and Affect: Mood normal. Behavior: Behavior normal.         Thought Content:  Thought content normal.         Judgment: Judgment normal.

## 2023-11-27 NOTE — PROGRESS NOTES
[facility] coding opportunities       Chart reviewed, no opportunity found: CHART REVIEWED, [address]     Patients Insurance     Medicare Insurance: [employer]

## 2023-11-30 ENCOUNTER — APPOINTMENT (OUTPATIENT)
Dept: LAB | Facility: MEDICAL CENTER | Age: 68
End: 2023-11-30
Payer: COMMERCIAL

## 2023-11-30 LAB
ALBUMIN SERPL BCP-MCNC: 4.2 G/DL (ref 3.5–5)
ALP SERPL-CCNC: 43 U/L (ref 34–104)
ALT SERPL W P-5'-P-CCNC: 12 U/L (ref 7–52)
ANION GAP SERPL CALCULATED.3IONS-SCNC: 7 MMOL/L
AST SERPL W P-5'-P-CCNC: 22 U/L (ref 13–39)
BASOPHILS # BLD AUTO: 0.07 THOUSANDS/ÂΜL (ref 0–0.1)
BASOPHILS NFR BLD AUTO: 1 % (ref 0–1)
BILIRUB SERPL-MCNC: 0.88 MG/DL (ref 0.2–1)
BUN SERPL-MCNC: 14 MG/DL (ref 5–25)
CALCIUM SERPL-MCNC: 9.1 MG/DL (ref 8.4–10.2)
CHLORIDE SERPL-SCNC: 98 MMOL/L (ref 96–108)
CHOLEST SERPL-MCNC: 176 MG/DL
CO2 SERPL-SCNC: 32 MMOL/L (ref 21–32)
CREAT SERPL-MCNC: 0.67 MG/DL (ref 0.6–1.3)
EOSINOPHIL # BLD AUTO: 0.1 THOUSAND/ÂΜL (ref 0–0.61)
EOSINOPHIL NFR BLD AUTO: 2 % (ref 0–6)
ERYTHROCYTE [DISTWIDTH] IN BLOOD BY AUTOMATED COUNT: 14.2 % (ref 11.6–15.1)
EST. AVERAGE GLUCOSE BLD GHB EST-MCNC: 131 MG/DL
GFR SERPL CREATININE-BSD FRML MDRD: 91 ML/MIN/1.73SQ M
GLUCOSE P FAST SERPL-MCNC: 80 MG/DL (ref 65–99)
HBA1C MFR BLD: 6.2 %
HCT VFR BLD AUTO: 40.4 % (ref 34.8–46.1)
HDLC SERPL-MCNC: 48 MG/DL
HGB BLD-MCNC: 13.1 G/DL (ref 11.5–15.4)
IMM GRANULOCYTES # BLD AUTO: 0.01 THOUSAND/UL (ref 0–0.2)
IMM GRANULOCYTES NFR BLD AUTO: 0 % (ref 0–2)
LDLC SERPL CALC-MCNC: 111 MG/DL (ref 0–100)
LYMPHOCYTES # BLD AUTO: 1.77 THOUSANDS/ÂΜL (ref 0.6–4.47)
LYMPHOCYTES NFR BLD AUTO: 29 % (ref 14–44)
MCH RBC QN AUTO: 30 PG (ref 26.8–34.3)
MCHC RBC AUTO-ENTMCNC: 32.4 G/DL (ref 31.4–37.4)
MCV RBC AUTO: 92 FL (ref 82–98)
MONOCYTES # BLD AUTO: 0.5 THOUSAND/ÂΜL (ref 0.17–1.22)
MONOCYTES NFR BLD AUTO: 8 % (ref 4–12)
NEUTROPHILS # BLD AUTO: 3.65 THOUSANDS/ÂΜL (ref 1.85–7.62)
NEUTS SEG NFR BLD AUTO: 60 % (ref 43–75)
NONHDLC SERPL-MCNC: 128 MG/DL
NRBC BLD AUTO-RTO: 0 /100 WBCS
PLATELET # BLD AUTO: 246 THOUSANDS/UL (ref 149–390)
PMV BLD AUTO: 11 FL (ref 8.9–12.7)
POTASSIUM SERPL-SCNC: 4.3 MMOL/L (ref 3.5–5.3)
PROT SERPL-MCNC: 6.9 G/DL (ref 6.4–8.4)
RBC # BLD AUTO: 4.37 MILLION/UL (ref 3.81–5.12)
SODIUM SERPL-SCNC: 137 MMOL/L (ref 135–147)
TRIGL SERPL-MCNC: 86 MG/DL
WBC # BLD AUTO: 6.1 THOUSAND/UL (ref 4.31–10.16)

## 2023-11-30 PROCEDURE — 80061 LIPID PANEL: CPT | Performed by: PHYSICIAN ASSISTANT

## 2023-11-30 PROCEDURE — 80053 COMPREHEN METABOLIC PANEL: CPT | Performed by: PHYSICIAN ASSISTANT

## 2023-11-30 PROCEDURE — 36415 COLL VENOUS BLD VENIPUNCTURE: CPT | Performed by: PHYSICIAN ASSISTANT

## 2023-11-30 PROCEDURE — 83036 HEMOGLOBIN GLYCOSYLATED A1C: CPT | Performed by: PHYSICIAN ASSISTANT

## 2023-11-30 PROCEDURE — 85025 COMPLETE CBC W/AUTO DIFF WBC: CPT | Performed by: PHYSICIAN ASSISTANT

## 2023-12-01 NOTE — RESULT ENCOUNTER NOTE
Please  inform patient  that her labs  are  stable.   She  is in the  prediabetic  rang  so  she  should  eat a low  carb/low  sugar  diet and  exercise
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
## 2023-12-07 ENCOUNTER — OFFICE VISIT (OUTPATIENT)
Dept: PSYCHIATRY | Facility: CLINIC | Age: 68
End: 2023-12-07
Payer: COMMERCIAL

## 2023-12-07 ENCOUNTER — TELEPHONE (OUTPATIENT)
Dept: PSYCHIATRY | Facility: CLINIC | Age: 68
End: 2023-12-07

## 2023-12-07 VITALS
HEIGHT: 70 IN | SYSTOLIC BLOOD PRESSURE: 140 MMHG | WEIGHT: 218 LBS | HEART RATE: 116 BPM | BODY MASS INDEX: 31.21 KG/M2 | DIASTOLIC BLOOD PRESSURE: 77 MMHG

## 2023-12-07 DIAGNOSIS — G25.9 EXTRAPYRAMIDAL REACTION: Chronic | ICD-10-CM

## 2023-12-07 DIAGNOSIS — Z79.899 LONG-TERM USE OF HIGH-RISK MEDICATION: Chronic | ICD-10-CM

## 2023-12-07 DIAGNOSIS — F31.78 BIPOLAR I DISORDER, MOST RECENT EPISODE MIXED, IN FULL REMISSION (HCC): Primary | Chronic | ICD-10-CM

## 2023-12-07 PROCEDURE — 99214 OFFICE O/P EST MOD 30 MIN: CPT | Performed by: PSYCHIATRY & NEUROLOGY

## 2023-12-07 PROCEDURE — 90833 PSYTX W PT W E/M 30 MIN: CPT | Performed by: PSYCHIATRY & NEUROLOGY

## 2023-12-07 RX ORDER — DIVALPROEX SODIUM 500 MG/1
500 TABLET, EXTENDED RELEASE ORAL EVERY EVENING
Qty: 90 TABLET | Refills: 2 | Status: SHIPPED | OUTPATIENT
Start: 2023-12-07 | End: 2024-09-02

## 2023-12-07 RX ORDER — BENZTROPINE MESYLATE 1 MG/1
1 TABLET ORAL 2 TIMES DAILY
Qty: 180 TABLET | Refills: 2 | Status: SHIPPED | OUTPATIENT
Start: 2023-12-07 | End: 2024-09-02

## 2023-12-07 RX ORDER — RISPERIDONE 1 MG/1
1 TABLET ORAL EVERY EVENING
Qty: 90 TABLET | Refills: 2 | Status: SHIPPED | OUTPATIENT
Start: 2023-12-07 | End: 2024-09-02

## 2023-12-07 NOTE — BH CRISIS PLAN
Client Name: Leelee Sue       Client YOB: 1955  : 1955    Treatment Team (include name and contact information):     Psychiatrist: Chanel Butterfield MD    Psychotherapist: None    : None    Healthcare Provider  TAE Jones [address] [place] [address]  [phone]    Type of Plan   * Child plans (children 15 yo and younger) must be completed and signed by the child's legal guardian   * Plans for all individuals 15 yo and above must be signed by the client. Plan Type: adolescent/adult (15 and over) Initial    My Personal Strengths are (in the client's own words):  "take my medicine, try to watch my sleep, try to keep things in control, try to watch priorities"    The stressors and triggers that may put me at risk are:  "When my  blows up"    Coping skills I can use to keep myself calm and safe:  "Reading, taking time for myself"    Coping skills/supports I can use to maintain abstinence from substance use:   Not Applicable    The people that provide me with help and support: (Include name, contact, and how they can help)    Support Persons #1 and #2:   *Extended Emergency Contact Information  Primary Emergency Contact: WinifredDanny  Address: [address] [place] [address] of [address] Phone: [phone]  Work Phone: [phone]  Relation: Spouse  Secondary Emergency Contact: Fortino Mendez  Mobile Phone: [phone]  Relation: Son   *How they can help me?  "He can send me articles that help"    In the past, the following has helped me in times of crisis:    " Being more open, standing up for myself "      If it is an emergency and you need immediate help, call     If there is a possibility of danger to yourself or others, call the following crisis hotline resources:     Adult Crisis Numbers  Suicide Prevention Hotline - Dial   [facility]: [address] [facility] [address]: [address] [place]: [address]: [phone]  [facility]: [phone]  [facility]: [address]: [address]: [phone] (daytime). [phone] (after hours, weekends, holidays)     Child/Adolescent Crisis Numbers   [facility]: [address]: [phone]   [facility] [place]: [phone]   [facility]: [phone]    Please note: Some [facility] do not have a separate number for Child/Adolescent specific crisis. If your county is not listed under Child/Adolescent, please call the adult number for your county     National Talk to Text Line   [employer] - [phone]    In the event your feelings become unmanageable, and you cannot reach your support system, you will call 911 immediately or go to the nearest hospital emergency room.

## 2023-12-07 NOTE — BH TREATMENT PLAN
TREATMENT PLAN (Medication Management Only)        [address]    Name/Date of Birth/MRN:  José Adrian 78 y.o. 1955 MRN: [id number]  Date of Treatment Plan: December 7, 2023  Diagnosis/Diagnoses:   1. Bipolar I disorder, most recent episode mixed, in full remission ([address])    2. Extrapyramidal reaction    3. Long-term use of high-risk medication      Strengths/Personal Resources for Self-Care: "take my medicine, try to watch my sleep, try to keep things in control, try to watch priorities". Area/Areas of need (in own words): "getting more done". 1. Long Term Goal:   maintain control of anxiety, maintain mood stability  Target Date: 4 months - 4/7/2024  Person/Persons responsible for completion of goal: Isabelle Marsh  2. Short Term Objective (s) - How will we reach this goal?:   A. Provider new recommended medication/dosage changes and/or continue medication(s): continue current medications as prescribed (Depakote ER, Risperdal, and Cogentin). B.  N/A.  C.  N/A. Target Date: 4 months - 4/7/2024  Person/Persons Responsible for Completion of Goal: Isabelle Marsh   Progress Towards Goals: stable  Treatment Modality: medication management every 4 months  Review due 180 days from date of this plan: 6 months - 6/7/2024  Expected length of service: maintenance unless revised  My Physician/PA/NP and I have developed this plan together and I agree to work on the goals and objectives. I understand the treatment goals that were developed for my treatment.   Electronic Signatures: on file (unless signed below)    Fuad Rodriguez MD 12/07/23

## 2023-12-07 NOTE — TELEPHONE ENCOUNTER
The patient contacted the office, informing the writer that she made it to the office an hour early for her appointment. The patient was wondering if she could be seen earlier. The writer informed the patient that the provider schedule is booked and there are no earlier appointments available for today. The patient verbalized understanding.

## 2024-01-03 ENCOUNTER — TELEPHONE (OUTPATIENT)
Dept: SLEEP CENTER | Facility: CLINIC | Age: 69
End: 2024-01-03

## 2024-01-09 ENCOUNTER — TELEPHONE (OUTPATIENT)
Dept: CARDIOLOGY CLINIC | Facility: CLINIC | Age: 69
End: 2024-01-09

## 2024-01-09 DIAGNOSIS — I48.91 ATRIAL FIBRILLATION WITH RVR (HCC): ICD-10-CM

## 2024-01-09 NOTE — TELEPHONE ENCOUNTER
Samples of this drug were given to the patient, quantity 42, Lot Number [id number]. The following was discussed with the patient as indicated: administration, storage, potential interactions, side effects.

## 2024-01-09 NOTE — TELEPHONE ENCOUNTER
Pt called stating she is in an insurance gap as her previous job dropped her from her insurance and her  is starting a new job and it will take some time until his insurance takes effect.     She would like to make Dr. Lewis aware she had to reschedule her appt to 03/27 as a result.    Pt was also provided a 3 week supply of Eliquis 5mg samples.

## 2024-01-29 ENCOUNTER — TELEPHONE (OUTPATIENT)
Dept: CARDIOLOGY CLINIC | Facility: CLINIC | Age: 69
End: 2024-01-29

## 2024-01-29 NOTE — TELEPHONE ENCOUNTER
Pt is in the process of changing her insurance company waiting for them to approve her medications. She was inquiring if theres any way we can give her Metoprolol and Eliquis samples until her insurance is all situated. I advised her we don't have any Metoprolol samples to give her but I can supply her 3 weeks worth of Eliquis samples. Pt is asking who she can speak to about getting Metoprolol samples.

## 2024-01-30 DIAGNOSIS — F31.78 BIPOLAR I DISORDER, MOST RECENT EPISODE MIXED, IN FULL REMISSION (HCC): Primary | Chronic | ICD-10-CM

## 2024-01-30 DIAGNOSIS — G25.9 EXTRAPYRAMIDAL REACTION: Chronic | ICD-10-CM

## 2024-01-30 DIAGNOSIS — I48.91 ATRIAL FIBRILLATION WITH RVR (HCC): ICD-10-CM

## 2024-01-30 RX ORDER — METOPROLOL TARTRATE 50 MG/1
50 TABLET, FILM COATED ORAL EVERY 12 HOURS
Qty: 180 TABLET | Refills: 3 | Status: SHIPPED | OUTPATIENT
Start: 2024-01-30 | End: 2025-01-24

## 2024-01-30 RX ORDER — BENZTROPINE MESYLATE 1 MG/1
1 TABLET ORAL 2 TIMES DAILY
Qty: 180 TABLET | Refills: 2 | Status: SHIPPED | OUTPATIENT
Start: 2024-01-30 | End: 2024-10-26

## 2024-01-30 RX ORDER — RISPERIDONE 1 MG/1
1 TABLET ORAL 2 TIMES DAILY
Qty: 180 TABLET | Refills: 2 | OUTPATIENT
Start: 2024-01-30 | End: 2024-10-26

## 2024-01-30 RX ORDER — DIVALPROEX SODIUM 500 MG/1
500 TABLET, EXTENDED RELEASE ORAL EVERY EVENING
Qty: 90 TABLET | Refills: 2 | Status: SHIPPED | OUTPATIENT
Start: 2024-01-30 | End: 2024-10-26

## 2024-01-30 RX ORDER — RISPERIDONE 1 MG/1
1 TABLET ORAL EVERY EVENING
Qty: 90 TABLET | Refills: 2 | Status: SHIPPED | OUTPATIENT
Start: 2024-01-30 | End: 2024-10-26

## 2024-01-30 NOTE — TELEPHONE ENCOUNTER
Medication Refill Request     Name of Medication risperdal  Dose/Frequency 1mg take 1 tablet by mouth every evening  Quantity 90  Verified pharmacy   [x]  Verified ordering Provider   [x]  Does patient have enough for the next 3 days? Yes [] No [x]  Does patient have a follow-up appointment scheduled? Yes [x] No []   If so when is appointment: 4/15/2024 2:30pm    Medication Refill Request     Name of Medication depakote er  Dose/Frequency 500mg take 1 tablet y mouth every evening  Quantity 90  Verified pharmacy   [x]  Verified ordering Provider   [x]  Does patient have enough for the next 3 days? Yes [] No [x]  Does patient have a follow-up appointment scheduled? Yes [x] No []   If so when is appointment: 4/15/2024 2:30pm    Medication Refill Request     Name of Medication cogentin  Dose/Frequency 1mg take 1 tablet by mouth 2 times  day  Quantity 180  Verified pharmacy   [x]  Verified ordering Provider   [x]  Does patient have enough for the next 3 days? Yes [] No [x]  Does patient have a follow-up appointment scheduled? Yes [x] No []   If so when is appointment: 4/15/2024 2:30pm

## 2024-01-30 NOTE — TELEPHONE ENCOUNTER
Depakote, Risperdal and Cogentin were escripted for 90 days with 2 RF - to [facility] as requested instead of [facility] mail order

## 2024-02-21 ENCOUNTER — TELEPHONE (OUTPATIENT)
Dept: CARDIOLOGY CLINIC | Facility: CLINIC | Age: 69
End: 2024-02-21

## 2024-02-21 PROBLEM — Z12.39 SCREENING FOR BREAST CANCER: Status: RESOLVED | Noted: 2018-11-15 | Resolved: 2024-02-21

## 2024-02-21 PROBLEM — Z12.11 SCREEN FOR COLON CANCER: Status: RESOLVED | Noted: 2018-11-15 | Resolved: 2024-02-21

## 2024-02-21 NOTE — TELEPHONE ENCOUNTER
Samples of this drug were given to the patient, quantity 3, Lot Number [id number]. The following was discussed with the patient as indicated: administration, storage, potential interactions, side effects.

## 2024-03-01 ENCOUNTER — OFFICE VISIT (OUTPATIENT)
Dept: CARDIOLOGY CLINIC | Facility: CLINIC | Age: 69
End: 2024-03-01
Payer: MEDICARE

## 2024-03-01 VITALS
BODY MASS INDEX: 31.55 KG/M2 | SYSTOLIC BLOOD PRESSURE: 102 MMHG | DIASTOLIC BLOOD PRESSURE: 68 MMHG | HEART RATE: 72 BPM | HEIGHT: 70 IN | WEIGHT: 220.4 LBS

## 2024-03-01 DIAGNOSIS — I50.32 CHRONIC DIASTOLIC CONGESTIVE HEART FAILURE (HCC): ICD-10-CM

## 2024-03-01 DIAGNOSIS — I48.19 OTHER PERSISTENT ATRIAL FIBRILLATION (HCC): Primary | ICD-10-CM

## 2024-03-01 PROCEDURE — 99214 OFFICE O/P EST MOD 30 MIN: CPT | Performed by: PHYSICIAN ASSISTANT

## 2024-03-01 PROCEDURE — 93000 ELECTROCARDIOGRAM COMPLETE: CPT | Performed by: PHYSICIAN ASSISTANT

## 2024-03-01 NOTE — PROGRESS NOTES
Electrophysiology Office Visit  [facility]  [facility] [address], [place] [address]    Name: Nitza Vitale  : 1955  MRN: [id number]    ASSESSMENT:  1. Other persistent atrial fibrillation (HCC)        2. Chronic diastolic congestive heart failure (HCC)            PLAN:  Persistent atrial fibrillation  - found incidentally by PCP 2018  - anticoagulated with Eliquis / [id number] score of 2 (age, sex)  - EF of 60% per echo 2022 / moderate dilation of left atrium noted  - rate control: metoprolol   - antiarrhythmic therapy: none (had side effects to amio, would need to be careful with class III AAD given #3)  - prior cardioversion: 2019 (required 4 shocks, lasted <1 month), 2019  - prior ablation: none (when attempted issues with insurance)  Chronic lower extremity edema  Bipolar disorder  - maintained on respiridone (QT prolonging properties)  Obstructive sleep apnea   - maintained on CPAP  Chest pressure    IMPRESSION:  Patient does have persistent atrial fibrillation, she had complaints of chest pressure which is why she made this appointment but this was in the setting of not having metoprolol and I do believe she is likely having atrial fibrillation with RVR in that setting.  She has not had that chest pressure since she did receive metoprolol.    Will continue current regimen.  We did also discuss looking out for CHF symptoms moving forward.    Patient is to follow up in our EP office in 6 months. She is to call our office with any further questions or concerns in the meantime.    HPI:   Interim history: Nitza Vitale is a 68 y.o. female with persistent atrial fibrillation anticoagulated with Eliquis, chronic lower extremity edema, bipolar disorder maintained on risperidone, and obstructive sleep apnea maintained on CPAP. She presents today for office visit due to being unwell.    Patient does follow with electrophysiology concerning  "atrial fibrillation.  More limited antiarrhythmics and has opted for ablation but due to insurance issues has not gone through with ablation.  She did make this appointment as she was having more symptoms of chest pressure, this was in the setting of not having metoprolol.  When she has been back on this medication she has not felt this chest pressure.  Otherwise does not offer significant complaints besides some shortness of breath.    Rhythm History:  1. Afib picked up incidentally by PCP 11/2018   A. Sent to ER due to RVR rate control attempted with OP CV 1/2019 (required 4 shocks, lasted <1 month)  2. Sent to EP SS 12/2019   A. Had opted for ablation > AAD (due to QT prolonging meds) - pt had held off at first   B. Eventually settled for rate control given insurance issues  3. Placed on amio 10/2021 but had side effects  4. Has been continued on rate control, echo 2022 stable with no drop in EF   A. Has had more fluid increasing lasix    EKG: atrial fibrillation with controlled ventricular response at 72 beats per minute    ROS:   Review of Systems   Constitutional: Negative for chills, diaphoresis, fever and malaise/fatigue.   Cardiovascular:  Positive for chest pain and palpitations (similar complaint in the past). Negative for claudication, cyanosis, dyspnea on exertion, irregular heartbeat, leg swelling, near-syncope, orthopnea, paroxysmal nocturnal dyspnea and syncope.   Respiratory:  Positive for shortness of breath (similar complaint in the past). Negative for sleep disturbances due to breathing.    Neurological:  Negative for dizziness and light-headedness.   All other systems reviewed and are negative.      OBJECTIVE:   Vitals:   /68 (BP Location: Left arm, Patient Position: Sitting, Cuff Size: Standard)   Pulse 72   Ht 5' 10\" (1.778 m)   Wt 100 kg (220 lb 6.4 oz)   BMI 31.62 kg/m²       Physical Exam:   Physical Exam  Vitals and nursing note reviewed.   Constitutional:       General: She is " not in acute distress.     Appearance: Normal appearance. She is well-developed. She is not diaphoretic.   HENT:      Head: Normocephalic and atraumatic.   Eyes:      Pupils: Pupils are equal, round, and reactive to light.   Neck:      Vascular: No JVD.   Cardiovascular:      Rate and Rhythm: Normal rate. Rhythm irregular.      Heart sounds: No murmur heard.     No friction rub. No gallop.   Pulmonary:      Effort: Pulmonary effort is normal. No respiratory distress.      Breath sounds: Normal breath sounds. No wheezing.   Abdominal:      Palpations: Abdomen is soft.   Musculoskeletal:         General: Normal range of motion.      Cervical back: Normal range of motion.   Skin:     General: Skin is warm and dry.   Neurological:      Mental Status: She is alert and oriented to person, place, and time.   Psychiatric:         Mood and Affect: Mood normal.         Behavior: Behavior normal.       Medications:      Current Outpatient Medications:     benztropine (COGENTIN) 1 mg tablet, Take 1 tablet (1 mg total) by mouth 2 (two) times a day, Disp: 180 tablet, Rfl: 2    divalproex sodium (DEPAKOTE ER) 500 mg 24 hr tablet, Take 1 tablet (500 mg total) by mouth every evening, Disp: 90 tablet, Rfl: 2    risperiDONE (RisperDAL) 1 mg tablet, Take 1 tablet (1 mg total) by mouth every evening, Disp: 90 tablet, Rfl: 2    acetaminophen (TYLENOL) 325 mg tablet, Take 650 mg by mouth every 6 (six) hours as needed for mild pain, Disp: , Rfl:     apixaban (Eliquis) 5 mg, Take 1 tablet (5 mg total) by mouth 2 (two) times a day, Disp: 42 tablet, Rfl: 0    ascorbic acid (VITAMIN C) 500 mg tablet, Take 500 mg by mouth daily, Disp: , Rfl:     furosemide (LASIX) 20 mg tablet, Take 1 tablet (20 mg total) by mouth 2 (two) times a day, Disp: 180 tablet, Rfl: 3    metoprolol tartrate (LOPRESSOR) 50 mg tablet, Take 1 tablet (50 mg total) by mouth every 12 (twelve) hours, Disp: 180 tablet, Rfl: 3    Multiple Vitamin (MULTI-VITAMIN DAILY) TABS, Take  1 tablet by mouth daily, Disp: , Rfl:     potassium chloride (MICRO-K) 10 MEQ CR capsule, Take 1 capsule (10 mEq total) by mouth 2 (two) times a day, Disp: 180 capsule, Rfl: 3     Family History   Problem Relation Age of Onset    Psychiatric Illness Maternal Aunt     Breast cancer Maternal Aunt     Stroke Mother         CVA    Heart failure Mother     Diabetes Mother     Hypertension Mother     Thyroid disease Mother     Gallbladder disease Mother         gallstones    Diabetes Maternal Grandmother     No Known Problems Maternal Grandfather     No Known Problems Paternal Grandmother     No Known Problems Paternal Grandfather     No Known Problems Maternal Aunt     No Known Problems Maternal Aunt     Substance Abuse Neg Hx     Suicide Attempts Neg Hx      Social History     Socioeconomic History    Marital status: /Civil Union     Spouse name: Not on file    Number of children: 1    Years of education: 12    Highest education level: 12th grade   Occupational History    Occupation: unemployed   Tobacco Use    Smoking status: Former     Current packs/day: 0.00     Average packs/day: 1 pack/day for 20.0 years (20.0 ttl pk-yrs)     Types: Cigarettes     Start date:      Quit date:      Years since quittin.1    Smokeless tobacco: Never   Vaping Use    Vaping status: Never Used   Substance and Sexual Activity    Alcohol use: No    Drug use: No    Sexual activity: Yes     Partners: Male   Other Topics Concern    Not on file   Social History Narrative    Education: high school graduate    Learning Disabilities: none    Marital History:     Children: 1 adult son    Living Arrangement: lives in home with     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, unemployed    Functioning Relationships: good support system,  is supportive    Legal History: none     History: None    Always uses seat belt      Social Determinants of Health     Financial Resource Strain: Medium Risk  (2023)    Overall Financial Resource Strain (CARDIA)     Difficulty of Paying Living Expenses: Somewhat hard   Food Insecurity: No Food Insecurity (2023)    Hunger Vital Sign     Worried About Running Out of Food in the Last Year: Never true     Ran Out of Food in the Last Year: Never true   Transportation Needs: No Transportation Needs (2023)    PRAPARE - Transportation     Lack of Transportation (Medical): No     Lack of Transportation (Non-Medical): No   Physical Activity: Sufficiently Active (2023)    Exercise Vital Sign     Days of Exercise per Week: 7 days     Minutes of Exercise per Session: 60 min   Stress: Stress Concern Present (2023)    [employer] - Occupational Stress Questionnaire     Feeling of Stress : To some extent   Social Connections: Moderately Isolated (2023)    Social Connection and Isolation Panel [NHANES]     Frequency of Communication with Friends and Family: Twice a week     Frequency of Social Gatherings with Friends and Family: Twice a week     Attends [employer] Services: Never     Active Member of Clubs or Organizations: No     Attends Club or Organization Meetings: Never     Marital Status:    Intimate Partner Violence: Not At Risk (2023)    Humiliation, Afraid, Rape, and Kick questionnaire     Fear of Current or Ex-Partner: No     Emotionally Abused: No     Physically Abused: No     Sexually Abused: No   Housing Stability: Low Risk  (2023)    Housing Stability Vital Sign     Unable to Pay for Housing in the Last Year: No     Number of Places Lived in the Last Year: 1     Unstable Housing in the Last Year: No     Social History     Tobacco Use   Smoking Status Former    Current packs/day: 0.00    Average packs/day: 1 pack/day for 20.0 years (20.0 ttl pk-yrs)    Types: Cigarettes    Start date:     Quit date:     Years since quittin.1   Smokeless Tobacco Never     Social History     Substance and  Sexual Activity   Alcohol Use No       Labs & Results:  Below is the patient's most recent value for Albumin, ALT, AST, BUN, Calcium, Chloride, Cholesterol, CO2, Creatinine, GFR, Glucose, HDL, Hematocrit, Hemoglobin, Hemoglobin A1C, LDL, Magnesium, Phosphorus, Platelets, Potassium, PSA, Sodium, Triglycerides, and WBC.   Lab Results   Component Value Date    ALT 12 2023    AST 22 2023    BUN 14 2023    CALCIUM 9.1 2023    CL 98 2023    CO2 32 2023    CREATININE 0.67 2023    HDL 48 (L) 2023    HCT 40.4 2023    HGB 13.1 2023    HGBA1C 6.2 (H) 2023    MG 2.0 2019     2023    K 4.3 2023     (L) 2016    TRIG 86 2023    WBC 6.10 2023     Note: for a comprehensive list of the patient's lab results, access the Results Review activity.    CARDIAC TESTING:   ECHO:   Results for orders placed during the hospital encounter of 10/08/20    Echo complete with contrast if indicated    [address] [address] [phone]    Transthoracic Echocardiogram  2D, M-mode, Doppler, and Color Doppler    Study date:  08-Oct-2020    Patient: MOHINDER BALDWIN  MR number: [id number]  Account number: [id number]  : 1955  Age: 64 years  Gender: Female  Status: Outpatient  Location: [facility]  Height: 70 in  Weight: 213.6 lb  BP: 122/ 70 mmHg    Indications: Atrial fibrillation    Diagnoses: I48.0 - Atrial fibrillation    Sonographer:  Edelmira Sanches BS, RDCS, RVT, RDMS  Primary Physician:  ALVIN HATFIELD  Referring Physician:  Catrachito Lewis MD  Group:  [facility]  Interpreting Physician:  Darien Albert MD    SUMMARY    LEFT VENTRICLE:  Systolic function was normal. Ejection fraction was estimated to be 60 %.  There were no regional wall motion abnormalities.  Transmitral flow pattern: atrial fibrillation.    LEFT ATRIUM:  The atrium was  mildly dilated.    RIGHT ATRIUM:  The atrium was mildly dilated.    MITRAL VALVE:  There was mild to moderate regurgitation.    TRICUSPID VALVE:  There was moderate regurgitation.  Pulmonary artery systolic pressure was within the normal range.    HISTORY: PRIOR HISTORY: A-Fib, DOMENIC    PROCEDURE: The study was performed in the [facility]. This was a routine study. The transthoracic approach was used. The study included complete 2D imaging, M-mode, complete spectral Doppler, and color Doppler. The heart rate was  89 bpm, at the start of the study. Images were obtained from the parasternal, apical, subcostal, and suprasternal notch acoustic windows. Image quality was good.    LEFT VENTRICLE: Size was normal. Systolic function was normal. Ejection fraction was estimated to be 60 %. There were no regional wall motion abnormalities. Wall thickness was normal. No evidence of apical thrombus. DOPPLER: Transmitral  flow pattern: atrial fibrillation.    RIGHT VENTRICLE: The size was normal. Systolic function was normal. Wall thickness was normal.    LEFT ATRIUM: The atrium was mildly dilated.    RIGHT ATRIUM: The atrium was mildly dilated.    MITRAL VALVE: Valve structure was normal. There was normal leaflet separation. DOPPLER: The transmitral velocity was within the normal range. There was no evidence for stenosis. There was mild to moderate regurgitation.    AORTIC VALVE: The valve was trileaflet. Leaflets exhibited normal thickness and normal cuspal separation. DOPPLER: Transaortic velocity was within the normal range. There was no evidence for stenosis. There was no significant  regurgitation.    TRICUSPID VALVE: The valve structure was normal. There was normal leaflet separation. DOPPLER: The transtricuspid velocity was within the normal range. There was no evidence for stenosis. There was moderate regurgitation. Pulmonary artery  systolic pressure was within the normal range.    PULMONIC VALVE: Leaflets  exhibited normal thickness, no calcification, and normal cuspal separation. DOPPLER: The transpulmonic velocity was within the normal range. There was no significant regurgitation.    PERICARDIUM: There was no pericardial effusion. The pericardium was normal in appearance.    AORTA: The root exhibited normal size.    SYSTEMIC VEINS: IVC: The inferior vena cava was normal in size.    SYSTEM MEASUREMENT TABLES    2D  %FS: 36.88 %  AV Diam: 3.12 cm  EDV(Teich): 65.03 ml  EF Biplane: 59.18 %  EF(Teich): 67.43 %  ESV(Teich): 21.18 ml  IVSd: 0.84 cm  LA Area: 27.52 cm2  LVEDV MOD A2C: 69.92 ml  LVEDV MOD A4C: 70.6 ml  LVEDV MOD BP: 71.98 ml  LVEF MOD A2C: 57.68 %  LVEF MOD A4C: 62.55 %  LVESV MOD A2C: 29.59 ml  LVESV MOD A4C: 26.44 ml  LVESV MOD BP: 29.38 ml  LVIDd: 3.88 cm  LVIDs: 2.45 cm  LVLd A2C: 7.56 cm  LVLd A4C: 7.12 cm  LVLs A2C: 6.75 cm  LVLs A4C: 6.07 cm  LVPWd: 0.77 cm  RA Area: 26.82 cm2  RVOT Diam: 4.09 cm  SI(Teich): 20.4 ml/m2  SV MOD A2C: 40.33 ml  SV MOD A4C: 44.16 ml  SV(Cube): 43.66 ml  SV(Teich): 43.85 ml    CW  AV MaxPG: 3.14 mmHg  AV Vmax: 0.89 m/s  TR MaxP.95 mmHg  TR Vmax: 2.29 m/s    MM  TAPSE: 2.04 cm    PW  E': 0.1 m/s  E/E': 11.33  LVOT Vmax: 0.86 m/s  LVOT maxP.98 mmHg  MV A Jorge: 0 m/s  MV Dec [place]: 10.31 m/s2  MV DecT: 111.51 ms  MV E Jorge: 1.15 m/s  MV E Jorge: 1.13 m/s  MV E/A Ratio: 454.87    [facility] Commission Accredited Echocardiography Laboratory    Prepared and electronically signed by    Darien Albert MD  Signed 08-Oct-2020 15:38:28    No results found for this or any previous visit.      CATH:  No results found for this or any previous visit.      STRESS TEST:  Results for orders placed during the hospital encounter of 19    NM myocardial perfusion spect (stress and/or rest)    [facility]  [address] [facility] [place] [address] [phone]    Rest/Stress Gated SPECT Myocardial Perfusion Imaging After Regadenoson and Exercise    Patient:  MOHINDER BALDWIN  MR number: [id number]  Account number: [id number]  : 1955  Age: 63 years  Gender: Female  Status: Outpatient  Location: [facility]  Height: 70 in  Weight: 197 lb  BP: 130/ 82 mmHg    Allergies: LEVOFLOXACIN, AMPICILLIN, CLINDAMYCIN    Diagnosis: I48.0 - Atrial fibrillation    Primary Physician:  ALVIN HATFIELD  RN:  Nichole Gramajo RN  Referring Physician:  Balbir Holden MD  Technician:  Nely Smyth  Group:  [facility]  Report Prepared By::  Nichole Gramajo RN  Interpreting Physician:  Darien Albert MD    INDICATIONS: Coronary artery disease.    HISTORY: The patient is a 63 year old  female. Chest pain status: no chest pain. Other symptoms: dyspnea. Cardiovascular history: arrhythmia. Medications: a beta blocker.    PHYSICAL EXAM: Baseline physical exam screening: normal and no wheezes audible.    REST ECG: Atrial fibrillation.    PROCEDURE: The study was performed in the the [facility]. A regadenoson infusion pharmacologic stress test was performed. Treadmill exercise testing was performed, using the Chaparro protocol. Gated SPECT myocardial  perfusion imaging was performed after stress. Systolic blood pressure was 130 mmHg, at the start of the study. Diastolic blood pressure was 82 mmHg, at the start of the study. The heart rate was 96 bpm, at the start of the study. IV double  checked.    CHAPARRO PROTOCOL:  HR bpm SBP mmHg DBP mmHg Symptoms  Baseline 96 130 82 none  Stage 1 184 210 80 moderate dyspnea    Regadenoson protocol:  HR bpm SBP mmHg DBP mmHg Symptoms  Baseline 109 162 80 none  2 min 105 128 70 none  4 min 109 120 70 none    STRESS SUMMARY: Duration of pharmacologic stress was 3 min and 0 sec. Maximal heart rate during stress was 210 bpm ( 133 % of maximal predicted heart rate) with limited exercise and thus she was switched to a chemical stress test. The  rate-pressure product for the peak heart rate  and blood pressure was 91261. There was no chest pain during stress. The stress test was terminated due to protocol completion. Pre oxygen saturation: 99 %. Peak oxygen saturation: 99 %.  Arrhythmia during stress: atrial fibrillation. The stress ECG was non-diagnostic.    ISOTOPE ADMINISTRATION:  Resting isotope administration Stress isotope administration  Agent Tetrofosmin Tetrofosmin  Dose 10.6 mCi 32.6 mCi  Date 03/11/2019 03/11/2019  Injection time 12:26 14:00  Injection-image interval 42 min 46 min    The radiopharmaceutical was injected at the peak effect of pharmacologic stress.    MYOCARDIAL PERFUSION IMAGING:  The image quality was good. Left ventricular size was normal. The TID ratio was 0.99.    PERFUSION DEFECTS:  -  There were no perfusion defects.    GATED SPECT:  The calculated left ventricular ejection fraction was 78 %. There was no left ventricular regional abnormality.    SUMMARY:  -  Stress results: Maximal heart rate during stress was 210 bpm ( 133 % of maximal predicted heart rate) with limited exercise and thus she was switched to a chemical stress test. Target heart rate was achieved. There was no chest pain  during stress.  -  ECG conclusions: Arrhythmia during stress: atrial fibrillation. The stress ECG was non-diagnostic.  -  Perfusion imaging: There were no perfusion defects.  -  Gated SPECT: The calculated left ventricular ejection fraction was 78 %. There was no left ventricular regional abnormality.    IMPRESSIONS: Normal study after pharmacologic vasodilation.  Myocardial perfusion imaging was normal at rest and with stress.    Prepared and signed by    Darien Albert MD  Signed 03/11/2019 15:58:05

## 2024-03-08 ENCOUNTER — APPOINTMENT (OUTPATIENT)
Dept: LAB | Facility: MEDICAL CENTER | Age: 69
End: 2024-03-08
Payer: MEDICARE

## 2024-03-08 DIAGNOSIS — F31.78 BIPOLAR I DISORDER, MOST RECENT EPISODE MIXED, IN FULL REMISSION (HCC): Chronic | ICD-10-CM

## 2024-03-08 DIAGNOSIS — I48.91 ATRIAL FIBRILLATION WITH RVR (HCC): ICD-10-CM

## 2024-03-08 DIAGNOSIS — Z79.899 LONG-TERM USE OF HIGH-RISK MEDICATION: Chronic | ICD-10-CM

## 2024-03-08 LAB — VALPROATE SERPL-MCNC: 51 UG/ML (ref 50–100)

## 2024-03-08 PROCEDURE — 80164 ASSAY DIPROPYLACETIC ACD TOT: CPT

## 2024-03-08 PROCEDURE — 36415 COLL VENOUS BLD VENIPUNCTURE: CPT

## 2024-03-08 NOTE — TELEPHONE ENCOUNTER
Samples of this drug were given to the patient, quantity 56, Lot Number [id number]. The following was discussed with the patient as indicated: administration, storage, potential interactions, side effects.

## 2024-03-13 ENCOUNTER — OFFICE VISIT (OUTPATIENT)
Dept: SLEEP CENTER | Facility: CLINIC | Age: 69
End: 2024-03-13
Payer: MEDICARE

## 2024-03-13 VITALS
WEIGHT: 218.6 LBS | SYSTOLIC BLOOD PRESSURE: 98 MMHG | DIASTOLIC BLOOD PRESSURE: 66 MMHG | BODY MASS INDEX: 31.3 KG/M2 | HEIGHT: 70 IN

## 2024-03-13 DIAGNOSIS — F31.78 BIPOLAR I DISORDER, MOST RECENT EPISODE MIXED, IN FULL REMISSION (HCC): Chronic | ICD-10-CM

## 2024-03-13 DIAGNOSIS — G47.33 OSA (OBSTRUCTIVE SLEEP APNEA): Primary | ICD-10-CM

## 2024-03-13 DIAGNOSIS — I48.19 OTHER PERSISTENT ATRIAL FIBRILLATION (HCC): ICD-10-CM

## 2024-03-13 DIAGNOSIS — I50.32 CHRONIC DIASTOLIC CONGESTIVE HEART FAILURE (HCC): ICD-10-CM

## 2024-03-13 PROCEDURE — 99213 OFFICE O/P EST LOW 20 MIN: CPT | Performed by: STUDENT IN AN ORGANIZED HEALTH CARE EDUCATION/TRAINING PROGRAM

## 2024-03-13 PROCEDURE — G2211 COMPLEX E/M VISIT ADD ON: HCPCS | Performed by: STUDENT IN AN ORGANIZED HEALTH CARE EDUCATION/TRAINING PROGRAM

## 2024-03-13 NOTE — PROGRESS NOTES
[facility]  Sleep Medicine Follow up/ Established Patient Visit      Assessment/Plan:  1. DOMENIC (obstructive sleep apnea)  PAP DME Resupply/Reorder      2. Bipolar I disorder, most recent episode mixed, in full remission (HCC)  PAP DME Resupply/Reorder      3. Other persistent atrial fibrillation (HCC)  PAP DME Resupply/Reorder      4. Chronic diastolic congestive heart failure (HCC)  PAP DME Resupply/Reorder        Nitza is a pleasant 68 year old woman with a PMHx of persistent atrial fibrillation on Eliquis, chronic diastolic CHF (EF 60% 5/2022), bipolar 1 disorder, prediabetes who presents in follow up for obstructive sleep apnea (LILLIE 10.5, O2 cayla 87% 4/2019).     Continue AutoPAP at settings of 4-10 cmH2O  Prescription for new supplies ordered today  Reviewed CMS/insurance requirements and resupply guidelines  Information provided on the above as well as general maintenance steps  I recommended she try to set an alarm for every evening at a set time to remind her to get up and go to bed, so that she doesn't sleep a significant portion of the night without the device.   She will Return in about 1 year (around 3/13/2025).      ________________________________________________________________________________________________    Per Last Visit Note (Date: 8/16/2022):  Reason for Visit:  66 y.o.female here for annual follow-up     Assessment:  Doing well on current therapy of APAP 4 to 10 cm for mild DOMENIC (AHI = 10.5 ).     Plan:  Continue same.  Consider F 30 interface     Follow up:  One year      Sleep Studies:  -HSAT 4/25/2019: .2 minutes, LILLIE 10.5, O2 cayla 87%    ________________________________________________________________________________________________      Interval History: Nitza Vitale is a 68 y.o. female with a PMHx of persistent atrial fibrillation on Eliquis, chronic diastolic CHF (EF 60% 5/2022), bipolar 1 disorder, prediabetes who presents in follow up for obstructive  "sleep apnea (LILLIE 10.5, O2 cayla 87% 2019).    SDB:  -Current experience with PAP Therapy: Beneficial. Does tell me that she had a bad cold in February, which affected her ability to sleep with the device. She also endorses difficulties with falling asleep unintentionally reading in a chair, and will sometimes sleep quite long, which will affect her hours of use with the device.  -Mask type: FFM  -Difficulties with mask: Denies  -Device: ResMed  -Difficulties with device: None  -DME: [facility]  -Compliance:  Set [id number]            [place] Sleepiness Scale:  What are your chances of dozing?   0= no chance  1= slight chance  2= moderate chance  3= high chance    Sitting and readin   Watching TV: 1  Sitting, inactive in a public place (e.g. a theatre or a meeting):0  As a passenger in a car for an hour without a break: 0  Lying down to rest in the afternoon when circumstances permit: 2   Sitting and talking to someone: 0  Sitting quietly after a lunch without alcohol: 1  In a car, while stopped for a few minutes in the traffic: 0       TOTAL  6/24  Greater or equal to 10 is positive for excessive daytime sleepiness        SLEEP HYGIENE QUESTIONS:  Bedtime: 8901-1482   Time it takes to fall sleep: 30 minutes  Wake up Time: 3504-0451   Estimated total sleep time ( in a 24 hour period of time): 7-9 hours   Naps: See above    Changes to PMH, PSH, SH: None     SLEEP RELATED ROS  Review of Systems  Allergies   Allergen Reactions    Levaquin [Levofloxacin]      \"heavy legs\"    Ampicillin Rash     Category: Allergy;     Clindamycin Rash     Category: Allergy;        CURRENT MEDICATIONS:  Current Outpatient Medications   Medication Instructions    acetaminophen (TYLENOL) 650 mg, Oral, As needed    apixaban (ELIQUIS) 5 mg, Oral, 2 times daily    ascorbic acid (VITAMIN C) 500 mg, Oral, Daily    benztropine (COGENTIN) 1 mg, Oral, 2 times daily    divalproex sodium (DEPAKOTE ER) 500 mg, Oral, Every evening    furosemide " "(LASIX) 20 mg, Oral, 2 times daily    metoprolol tartrate (LOPRESSOR) 50 mg, Oral, Every 12 hours    Multiple Vitamin (MULTI-VITAMIN DAILY) TABS 1 tablet, Oral, Daily    potassium chloride (MICRO-K) 10 MEQ CR capsule 10 mEq, Oral, 2 times daily    risperiDONE (RISPERDAL) 1 mg, Oral, Every evening           PHYSICAL EXAMINATION:  Vital Signs: BP 98/66   Ht 5' 10\" (1.778 m)   Wt 99.2 kg (218 lb 9.6 oz)   BMI 31.37 kg/m²     Constitutional: NAD, well appearing   Mental Status: AAOx3  Skin: Warm, dry, no rashes noted   Eyes: PERRL, normal conjunctiva  ENT: Nasal congestion absent, nasal valve incompetence absent.  Posterior Airspace:   [place] Tongue Position: 4  Retrognathia: absent  Overbite: absent  Chest: No evidence of respiratory distress, no accessory muscle use; no evidence of peripheral cyanosis   Abdomen: Soft, NT/ND  Extremities: No digital clubbing or pedal edema  Neuro: Strength 5/5 throughout, sensation grossly intact      I have spent a total time of 20-30 minutes on 03/13/24 in caring for this patient including Diagnostic results, Prognosis, Risks and benefits of tx options, Instructions for management, Patient and family education, Importance of tx compliance, Risk factor reductions, Documenting in the medical record, Reviewing / ordering tests, medicine, procedures  , and Obtaining or reviewing history  .        Electronically signed by:    Abraham Cornejo DO  Board-Certified Neurology and Sleep Medicine  [facility]  03/13/24      "

## 2024-03-13 NOTE — PATIENT INSTRUCTIONS
Continue PAP Therapy  Continue AutoPAP at 4-10 cmH20.  Remember to clean your mask and equipment regularly, as directed.  You should be eligible for new supplies approximately every 3-6 months, depending on your insurance coverage. Contact your Durable Medical Equipment (DME) company for new supplies as needed.  Recommend trying to set an alarm for every evening at a set time to remind you to get up and go to bed, so that you don't sleep a significant portion of the night without the device.   Follow up in 12 months.      Care and Maintenance  Headgear should be washed as needed. Daily inspection and weekly washings are recommended. Do not disassemble the straps. Machine wash in warm water, making sure to attach Velcro hooks and tabs before washing. Line dry or machine dry on a low setting.  Masks should be washed every day. Daily inspection is recommended. Leave the mask and tubing attached. Gently wash the mask with a soft cloth using warm water and mild detergent, concentrating on the mask cushion flaps. DO NOT use alcohol or bleach. Rinse thoroughly and air dry.  Tubing and headgear should be washed weekly. Daily inspection is recommended. Wash in warm water and mild detergent and rinse thoroughly. Hook the tubing to the machine and blow until dry.  Humidifier should be washed daily and filled with DISTILLED water before use. Wash with warm water and mild detergent. Disinfect weekly by soaking with a solution of 1 part white vinegar and 3 parts water for 30 minutes. Rinse thoroughly and air dry.  Disposable filters should be replaced once a month. Wash reusable foam filters with warm water and mild detergent at least once a month. Rinse thoroughly and dry with paper towels.  Avoid  that contain fragrance or conditioners, as these will leave a residue.  NEVER iron any soft goods.      CMS Requirements    Your insurance requires a face-to-face follow up visit within a 31-90 day period after starting  CPAP.  Your insurance requires compliance with CPAP, which is at least 4 hours per night for 70% of the time. This must be done over a 30 day period and must occur within the initial 31-90 day period after starting CPAP.  Your insurance also requires at least yearly follow ups to continue to pay for CPAP supplies.       PAP Supply Guidelines    Below are the guidelines for reordering your supplies. You will be responsible for your deductible, co payments, and out of pocket expenses.    Item Frequency   Nasal Mask (no headgear) 1 every 3 months   Nasal Mask Cushion 1 every 2 weeks   Full Face Mask (no headgear) 1 every 3 months   Full Face Mask Cushion 1 every month   Nasal Pillows 1 every 2 weeks   Headgear 1 every 6 months   hin Strap 1 every 6 months   mel 1 every 3 months   Filters: Reusable 1 every 6 months   Filters: Disposable 1 every 2 weeks   Humidifier Chamber(disposable) 1 every 6 months

## 2024-03-14 ENCOUNTER — TELEPHONE (OUTPATIENT)
Dept: SLEEP CENTER | Facility: CLINIC | Age: 69
End: 2024-03-14
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
## 2024-04-15 ENCOUNTER — OFFICE VISIT (OUTPATIENT)
Dept: PSYCHIATRY | Facility: CLINIC | Age: 69
End: 2024-04-15

## 2024-04-15 VITALS
BODY MASS INDEX: 31.21 KG/M2 | HEART RATE: 113 BPM | HEIGHT: 70 IN | WEIGHT: 218 LBS | DIASTOLIC BLOOD PRESSURE: 71 MMHG | SYSTOLIC BLOOD PRESSURE: 109 MMHG

## 2024-04-15 DIAGNOSIS — F31.78 BIPOLAR I DISORDER, MOST RECENT EPISODE MIXED, IN FULL REMISSION (HCC): Primary | Chronic | ICD-10-CM

## 2024-04-15 DIAGNOSIS — G25.9 EXTRAPYRAMIDAL REACTION: Chronic | ICD-10-CM

## 2024-04-15 DIAGNOSIS — Z79.899 LONG-TERM USE OF HIGH-RISK MEDICATION: Chronic | ICD-10-CM

## 2024-04-15 NOTE — BH TREATMENT PLAN
"TREATMENT PLAN (Medication Management Only)        [facility]    Name/Date of Birth/MRN:  Nitza Vitale 68 y.o. 1955 MRN: [id number]  Date of Treatment Plan: April 15, 2024  Diagnosis/Diagnoses:   1. Bipolar I disorder, most recent episode mixed, in full remission (HCC)    2. Extrapyramidal reaction    3. Long-term use of high-risk medication      Strengths/Personal Resources for Self-Care: \"I am taking my medicines when I am supposed to\".  Area/Areas of need (in own words): \"maintaining\".  1. Long Term Goal:   maintain control of anxiety, maintain mood stability  Target Date: 4 months - 8/15/2024  Person/Persons responsible for completion of goal: Nitza  2.  Short Term Objective (s) - How will we reach this goal?:   A.  Provider new recommended medication/dosage changes and/or continue medication(s): continue current medications as prescribed (Depakote, Risperdal, and Cogentin).  B.  N/A.  C.  N/A.  Target Date: 4 months - 8/15/2024  Person/Persons Responsible for Completion of Goal: Nitza   Progress Towards Goals: stable  Treatment Modality: medication management every 4 months  Review due 180 days from date of this plan: 6 months - 10/15/2024  Expected length of service: maintenance unless revised  My Physician/PA/NP and I have developed this plan together and I agree to work on the goals and objectives. I understand the treatment goals that were developed for my treatment.  Electronic Signatures: on file (unless signed below)    Mouna Brewster MD 04/15/24  "

## 2024-04-15 NOTE — BH CRISIS PLAN
"Client Name: Nitza Vitale       Client YOB: 1955    Юлия Safety Plan      Creation Date: 12/7/23 Update Date: 4/15/24   Created By: Mouna Brewster MD Last Updated By: Mouna Brewster MD      Step 1: Warning Signs:   Warning Signs   \"When my  blows up\"            Step 2: Internal Coping Strategies:   Internal Coping Strategies   \"Reading, taking time for myself\"            Step 3: People and social settings that provide distraction:   Name Contact Information    Danny [phone]   Son Seun [phone]            Step 4: People whom I can ask for help during a crisis:      Name Contact Information    as above people       Step 5: Professionals or agencies I can contact during a crisis:      Clinican/Agency Name Phone Emergency Contact    [facility] [phone]       [facility] Emergency Department Emergency Department Phone Emergency Department Address    [facility] [address]         Crisis Phone Numbers:   Suicide Prevention Lifeline: Call or Text  747 Crisis Text Line: Text HOME to 007-257   Please note: Some [facility] do not have a separate number for Child/Adolescent specific crisis. If your county is not listed under Child/Adolescent, please call the adult number for your county      Adult Crisis Numbers: Child/Adolescent Crisis Numbers   [facility]: [phone] [facility]: [phone]   [facility]: [phone] [facility]: [phone]   [facility]: [phone] [place]: [phone]   [facility]: [phone] [place]: [phone]   [place]: [phone]   [facility]: [phone]   [facility]: [phone]   [facility]: [phone] (daytime) [phone] (after hours, weekends, holidays)      Step 6: Making the environment safer (plan for lethal means safety):   Plan: Gun - locked     Optional: What is most important to me and worth living " for?   I do want to love longer     Юлия Safety Plan. Laura Mckinley and Deven Plunkett. Used with permission of the authors.

## 2024-04-18 ENCOUNTER — TELEPHONE (OUTPATIENT)
Dept: PSYCHIATRY | Facility: CLINIC | Age: 69
End: 2024-04-18

## 2024-04-18 DIAGNOSIS — R60.9 EDEMA: ICD-10-CM

## 2024-04-18 RX ORDER — FUROSEMIDE 20 MG/1
20 TABLET ORAL 2 TIMES DAILY
Qty: 180 TABLET | Refills: 3 | Status: SHIPPED | OUTPATIENT
Start: 2024-04-18

## 2024-04-18 RX ORDER — POTASSIUM CHLORIDE 750 MG/1
10 CAPSULE, EXTENDED RELEASE ORAL 2 TIMES DAILY
Qty: 180 CAPSULE | Refills: 3 | Status: SHIPPED | OUTPATIENT
Start: 2024-04-18

## 2024-04-18 NOTE — TELEPHONE ENCOUNTER
Patient called the office and left a voicemail for a return call.Writer returned the call to the patient. She wanted to update her phone number and her emergency contacts phone numbers.  was able to assist with this,

## 2024-05-06 DIAGNOSIS — I48.91 ATRIAL FIBRILLATION WITH RVR (HCC): ICD-10-CM

## 2024-05-06 NOTE — TELEPHONE ENCOUNTER
Samples of this drug were given to the patient, quantity 4 boxes, Lot Number [id number]. The following was discussed with the patient as indicated: administration, storage, potential interactions, side effects.

## 2024-05-29 ENCOUNTER — OFFICE VISIT (OUTPATIENT)
Dept: FAMILY MEDICINE CLINIC | Facility: CLINIC | Age: 69
End: 2024-05-29
Payer: MEDICARE

## 2024-05-29 VITALS
BODY MASS INDEX: 34.15 KG/M2 | HEART RATE: 65 BPM | WEIGHT: 217.6 LBS | DIASTOLIC BLOOD PRESSURE: 60 MMHG | HEIGHT: 67 IN | TEMPERATURE: 97.7 F | SYSTOLIC BLOOD PRESSURE: 106 MMHG | OXYGEN SATURATION: 97 %

## 2024-05-29 DIAGNOSIS — I50.32 CHRONIC DIASTOLIC CONGESTIVE HEART FAILURE (HCC): ICD-10-CM

## 2024-05-29 DIAGNOSIS — Z13.220 SCREENING FOR LIPID DISORDERS: ICD-10-CM

## 2024-05-29 DIAGNOSIS — G47.33 OSA ON CPAP: ICD-10-CM

## 2024-05-29 DIAGNOSIS — Z00.00 MEDICARE ANNUAL WELLNESS VISIT, SUBSEQUENT: Primary | ICD-10-CM

## 2024-05-29 DIAGNOSIS — E66.09 CLASS 1 OBESITY DUE TO EXCESS CALORIES WITH SERIOUS COMORBIDITY AND BODY MASS INDEX (BMI) OF 31.0 TO 31.9 IN ADULT: ICD-10-CM

## 2024-05-29 DIAGNOSIS — R60.0 EDEMA OF BOTH LOWER LEGS: ICD-10-CM

## 2024-05-29 DIAGNOSIS — F31.78 BIPOLAR I DISORDER, MOST RECENT EPISODE MIXED, IN FULL REMISSION (HCC): Chronic | ICD-10-CM

## 2024-05-29 DIAGNOSIS — I48.19 OTHER PERSISTENT ATRIAL FIBRILLATION (HCC): ICD-10-CM

## 2024-05-29 DIAGNOSIS — R73.03 PREDIABETES: ICD-10-CM

## 2024-05-29 PROCEDURE — 99214 OFFICE O/P EST MOD 30 MIN: CPT | Performed by: PHYSICIAN ASSISTANT

## 2024-05-29 PROCEDURE — G0439 PPPS, SUBSEQ VISIT: HCPCS | Performed by: PHYSICIAN ASSISTANT

## 2024-05-29 NOTE — PROGRESS NOTES
Ambulatory Visit  Name: Nitza Vitale      : 1955      MRN: [id number]  Encounter Provider: Lisha Kamara PA-C  Encounter Date: 2024   Encounter department: [facility]    Assessment & Plan   1. Medicare annual wellness visit, subsequent  2. Other persistent atrial fibrillation (HCC)  Comments:  This is rate controlled with metoprolol.  Patient is also on Eliquis for anticoagulation  Orders:  -     Comprehensive metabolic panel  3. Chronic diastolic congestive heart failure (HCC)  Comments:  Currently well compensated.  Patient using Lasix and potassium as needed  Orders:  -     Lipid panel  4. DOMENIC on CPAP  5. Bipolar I disorder, most recent episode mixed, in full remission (HCC)  Comments:  This is managed by psychiatry.  Continue current regimen  6. Prediabetes  Comments:  Low-carb low sugar diet.  Check lab  Orders:  -     CBC and differential  -     Lipid panel  -     Hemoglobin A1C  7. Class 1 obesity due to excess calories with serious comorbidity and body mass index (BMI) of 31.0 to 31.9 in adult  Comments:  Diet and exercise to promote weight loss  Orders:  -     Lipid panel  8. Edema of both lower legs  Comments:  Continue 6 as needed.  9. Screening for lipid disorders  -     Lipid panel       Preventive health issues were discussed with patient, and age appropriate screening tests were ordered as noted in patient's After Visit Summary. Personalized health advice and appropriate referrals for health education or preventive services given if needed, as noted in patient's After Visit Summary.    History of Present Illness     Patient presents in the office for follow-up chronic condition.  Patient has a history of A-fib and congestive heart failure.  He is closely with her cardiology provider.  She is on Eliquis 5 mg twice daily.  Patient has new supplemental insurance.  Patient states she is seen to get insurance to supplement Medicare.  He is currently paying for Eliquis  out-of-pocket and been receiving samples from her cardiologist.  He is still in A-fib.  This is rate controlled with metoprolol 50 mg twice daily.  Is using Lasix 20 mg 1-2 a day along with potassium as needed.  Been weighing herself daily.  Also checking her lower legs.  Lower legs are both edematous without any pitting edema.  Chronic venous skin changes without any current ulcerations.  Patient has obstructive sleep apnea she is on CPAP.  Elevated fasting blood sugar.  This is diet controlled.  He is overweight.  Lucía diet.  Urged exercise.  Patient also has bipolar disorder.  She is under care of psychiatry.  She is currently doing well.  She will continue Depakote, Cogentin and Risperdal.  Patient states when she gets a supplemental insurance to her Medicare she will have her screening mammogram and DEXA scan completed       Patient Care Team:  Lisha Kamara PA-C as PCP - General (Family Medicine)    Review of Systems  Medical History Reviewed by provider this encounter:  Allergies  Meds  Problems       Annual Wellness Visit Questionnaire   Nitza is here for her Subsequent Wellness visit.     Health Risk Assessment:   Patient rates overall health as good. Patient feels that their physical health rating is same. Patient is satisfied with their life. Eyesight was rated as slightly worse. Hearing was rated as same. Patient feels that their emotional and mental health rating is same. Patients states they are never, rarely angry. Patient states they are often unusually tired/fatigued. Pain experienced in the last 7 days has been some. Patient's pain rating has been 3/10. Patient states that she has experienced no weight loss or gain in last 6 months. Back pain and headaches    Depression Screening:   PHQ-2 Score: 0      Fall Risk Screening:   In the past year, patient has experienced: no history of falling in past year      Urinary Incontinence Screening:   Patient has not leaked urine accidently in the last  six months.     Home Safety:  Patient does not have trouble with stairs inside or outside of their home. Patient has working smoke alarms and has working carbon monoxide detector. Home safety hazards include: household clutter.     Nutrition:   Current diet is Regular.     Medications:   Patient is currently taking over-the-counter supplements. OTC medications include: see medication list. Patient is able to manage medications.     Activities of Daily Living (ADLs)/Instrumental Activities of Daily Living (IADLs):   Walk and transfer into and out of bed and chair?: Yes  Dress and groom yourself?: Yes    Bathe or shower yourself?: Yes    Feed yourself? Yes  Do your laundry/housekeeping?: Yes  Manage your money, pay your bills and track your expenses?: Yes  Make your own meals?: Yes    Do your own shopping?: Yes    Previous Hospitalizations:   Any hospitalizations or ED visits within the last 12 months?: No      Advance Care Planning:   Living will: No    Durable POA for healthcare: No      Cognitive Screening:   Provider or family/friend/caregiver concerned regarding cognition?: No    PREVENTIVE SCREENINGS      Cardiovascular Screening:      Due for: Lipid Panel      Diabetes Screening:       Due for: Blood Glucose      Colorectal Cancer Screening:       Due for: Colonoscopy - Low Risk      Breast Cancer Screening:       Due for: Mammogram        Cervical Cancer Screening:    General: Screening Not Indicated      Lung Cancer Screening:     General: Screening Not Indicated      Hepatitis C Screening:    General: Screening Current    Screening, Brief Intervention, and Referral to Treatment (SBIRT)    Screening  Typical number of drinks in a day: 0  Typical number of drinks in a week: 0  Interpretation: Low risk drinking behavior.    Single Item Drug Screening:  How often have you used an illegal drug (including marijuana) or a prescription medication for non-medical reasons in the past year? never    Single Item Drug  "Screen Score: 0  Interpretation: Negative screen for possible drug use disorder    Brief Intervention  Alcohol & drug use screenings were reviewed. No concerns regarding substance use disorder identified.     Social Determinants of Health     Financial Resource Strain: Medium Risk (4/15/2024)    Overall Financial Resource Strain (CARDIA)     Difficulty of Paying Living Expenses: Somewhat hard   Food Insecurity: No Food Insecurity (5/29/2024)    Hunger Vital Sign     Worried About Running Out of Food in the Last Year: Never true     Ran Out of Food in the Last Year: Never true   Transportation Needs: No Transportation Needs (5/29/2024)    PRAPARE - Transportation     Lack of Transportation (Medical): No     Lack of Transportation (Non-Medical): No   Housing Stability: Low Risk  (5/29/2024)    Housing Stability Vital Sign     Unable to Pay for Housing in the Last Year: No     Number of Times Moved in the Last Year: 1     Homeless in the Last Year: No   Utilities: Not At Risk (5/29/2024)    [facility] Utilities     Threatened with loss of utilities: No     No results found.    Objective     /60 (BP Location: Right arm, Patient Position: Sitting, Cuff Size: Large)   Pulse 65   Temp 97.7 °F (36.5 °C) (Temporal)   Ht 5' 7\" (1.702 m)   Wt 98.7 kg (217 lb 9.6 oz)   SpO2 97%   BMI 34.08 kg/m²     Physical Exam  Vitals and nursing note reviewed.   Constitutional:       General: She is not in acute distress.     Appearance: She is well-developed. She is obese. She is not ill-appearing or diaphoretic.   HENT:      Head: Normocephalic and atraumatic.      Right Ear: External ear normal. There is impacted cerumen.      Left Ear: External ear normal. There is impacted cerumen.   Eyes:      Conjunctiva/sclera: Conjunctivae normal.      Pupils: Pupils are equal, round, and reactive to light.   Neck:      Thyroid: No thyromegaly.      Vascular: No carotid bruit.   Cardiovascular:      Rate and Rhythm: Normal rate. Rhythm " irregular.      Heart sounds: Normal heart sounds. No murmur heard.     No friction rub.   Pulmonary:      Effort: Pulmonary effort is normal. No respiratory distress.      Breath sounds: Normal breath sounds. No wheezing.   Abdominal:      General: Bowel sounds are normal. There is no distension.      Palpations: Abdomen is soft. There is no mass.      Tenderness: There is no abdominal tenderness.   Musculoskeletal:      Right lower leg: Edema present.      Left lower leg: Edema present.      Comments: Bilateral lower leg edema.  Currently nonpitting.  Chronic venous stasis skin changes.  No open ulcerations   Lymphadenopathy:      Cervical: No cervical adenopathy.   Skin:     General: Skin is warm and dry.   Neurological:      General: No focal deficit present.      Mental Status: She is alert and oriented to person, place, and time.   Psychiatric:         Mood and Affect: Mood normal.         Behavior: Behavior normal.         Thought Content: Thought content normal.         Judgment: Judgment normal.       Administrative Statements

## 2024-05-29 NOTE — PATIENT INSTRUCTIONS
Medicare Preventive Visit Patient Instructions  Thank you for completing your Welcome to Medicare Visit or Medicare Annual Wellness Visit today. Your next wellness visit will be due in one year (5/30/2025).  The screening/preventive services that you may require over the next 5-10 years are detailed below. Some tests may not apply to you based off risk factors and/or age. Screening tests ordered at today's visit but not completed yet may show as past due. Also, please note that scanned in results may not display below.  Preventive Screenings:  Service Recommendations Previous Testing/Comments   Colorectal Cancer Screening  * Colonoscopy    * Fecal Occult Blood Test (FOBT)/Fecal Immunochemical Test (FIT)  * Fecal DNA/Cologuard Test  * Flexible Sigmoidoscopy Age: 45-75 years old   Colonoscopy: every 10 years (may be performed more frequently if at higher risk)  OR  FOBT/FIT: every 1 year  OR  Cologuard: every 3 years  OR  Sigmoidoscopy: every 5 years  Screening may be recommended earlier than age 45 if at higher risk for colorectal cancer. Also, an individualized decision between you and your healthcare provider will decide whether screening between the ages of 76-85 would be appropriate. Colonoscopy: Not on file  FOBT/FIT: Not on file  Cologuard: Not on file  Sigmoidoscopy: Not on file          Breast Cancer Screening Age: 40+ years old  Frequency: every 1-2 years  Not required if history of left and right mastectomy Mammogram: 06/13/2022    Screening Current   Cervical Cancer Screening Between the ages of 21-29, pap smear recommended once every 3 years.   Between the ages of 30-65, can perform pap smear with HPV co-testing every 5 years.   Recommendations may differ for women with a history of total hysterectomy, cervical cancer, or abnormal pap smears in past. Pap Smear: Not on file    Screening Not Indicated   Hepatitis C Screening Once for adults born between 1945 and 1965  More frequently in patients at high  risk for Hepatitis C Hep C Antibody: 01/22/2015    Screening Current   Diabetes Screening 1-2 times per year if you're at risk for diabetes or have pre-diabetes Fasting glucose: 80 mg/dL (11/30/2023)  A1C: 6.2 % (11/30/2023)  Screening Current   Cholesterol Screening Once every 5 years if you don't have a lipid disorder. May order more often based on risk factors. Lipid panel: 11/30/2023    Screening Current     Other Preventive Screenings Covered by Medicare:  Abdominal Aortic Aneurysm (AAA) Screening: covered once if your at risk. You're considered to be at risk if you have a family history of AAA.  Lung Cancer Screening: covers low dose CT scan once per year if you meet all of the following conditions: (1) Age 55-77; (2) No signs or symptoms of lung cancer; (3) Current smoker or have quit smoking within the last 15 years; (4) You have a tobacco smoking history of at least 20 pack years (packs per day multiplied by number of years you smoked); (5) You get a written order from a healthcare provider.  Glaucoma Screening: covered annually if you're considered high risk: (1) You have diabetes OR (2) Family history of glaucoma OR (3)  aged 50 and older OR (4)  American aged 65 and older  Osteoporosis Screening: covered every 2 years if you meet one of the following conditions: (1) You're estrogen deficient and at risk for osteoporosis based off medical history and other findings; (2) Have a vertebral abnormality; (3) On glucocorticoid therapy for more than 3 months; (4) Have primary hyperparathyroidism; (5) On osteoporosis medications and need to assess response to drug therapy.   Last bone density test (DXA Scan): Not on file.  HIV Screening: covered annually if you're between the age of 15-65. Also covered annually if you are younger than 15 and older than 65 with risk factors for HIV infection. For pregnant patients, it is covered up to 3 times per pregnancy.    Immunizations:  Immunization  Recommendations   Influenza Vaccine Annual influenza vaccination during flu season is recommended for all persons aged >= 6 months who do not have contraindications   Pneumococcal Vaccine   * Pneumococcal conjugate vaccine = PCV13 (Prevnar 13), PCV15 (Vaxneuvance), PCV20 (Prevnar 20)  * Pneumococcal polysaccharide vaccine = PPSV23 (Pneumovax) Adults 19-65 yo with certain risk factors or if 65+ yo  If never received any pneumonia vaccine: recommend Prevnar 20 (PCV20)  Give PCV20 if previously received 1 dose of PCV13 or PPSV23   Hepatitis B Vaccine 3 dose series if at intermediate or high risk (ex: diabetes, end stage renal disease, liver disease)   Respiratory syncytial virus (RSV) Vaccine - COVERED BY MEDICARE PART D  * RSVPreF3 (Arexvy) CDC recommends that adults 60 years of age and older may receive a single dose of RSV vaccine using shared clinical decision-making (SCDM)   Tetanus (Td) Vaccine - COST NOT COVERED BY MEDICARE PART B Following completion of primary series, a booster dose should be given every 10 years to maintain immunity against tetanus. Td may also be given as tetanus wound prophylaxis.   Tdap Vaccine - COST NOT COVERED BY MEDICARE PART B Recommended at least once for all adults. For pregnant patients, recommended with each pregnancy.   Shingles Vaccine (Shingrix) - COST NOT COVERED BY MEDICARE PART B  2 shot series recommended in those 19 years and older who have or will have weakened immune systems or those 50 years and older     Health Maintenance Due:      Topic Date Due   • Colorectal Cancer Screening  Never done   • Breast Cancer Screening: Mammogram  06/13/2023   • Hepatitis C Screening  Completed     Immunizations Due:  There are no preventive care reminders to display for this patient.  Advance Directives   What are advance directives?  Advance directives are legal documents that state your wishes and plans for medical care. These plans are made ahead of time in case you lose your  ability to make decisions for yourself. Advance directives can apply to any medical decision, such as the treatments you want, and if you want to donate organs.   What are the types of advance directives?  There are many types of advance directives, and each state has rules about how to use them. You may choose a combination of any of the following:  Living will:  This is a written record of the treatment you want. You can also choose which treatments you do not want, which to limit, and which to stop at a certain time. This includes surgery, medicine, IV fluid, and tube feedings.   Durable power of  for healthcare (DPAHC):  This is a written record that states who you want to make healthcare choices for you when you are unable to make them for yourself. This person, called a proxy, is usually a family member or a friend. You may choose more than 1 proxy.  Do not resuscitate (DNR) order:  A DNR order is used in case your heart stops beating or you stop breathing. It is a request not to have certain forms of treatment, such as CPR. A DNR order may be included in other types of advance directives.  Medical directive:  This covers the care that you want if you are in a coma, near death, or unable to make decisions for yourself. You can list the treatments you want for each condition. Treatment may include pain medicine, surgery, blood transfusions, dialysis, IV or tube feedings, and a ventilator (breathing machine).  Values history:  This document has questions about your views, beliefs, and how you feel and think about life. This information can help others choose the care that you would choose.  Why are advance directives important?  An advance directive helps you control your care. Although spoken wishes may be used, it is better to have your wishes written down. Spoken wishes can be misunderstood, or not followed. Treatments may be given even if you do not want them. An advance directive may make it easier  for your family to make difficult choices about your care.   Weight Management   Why it is important to manage your weight:  Being overweight increases your risk of health conditions such as heart disease, high blood pressure, type 2 diabetes, and certain types of cancer. It can also increase your risk for osteoarthritis, sleep apnea, and other respiratory problems. Aim for a slow, steady weight loss. Even a small amount of weight loss can lower your risk of health problems.  How to lose weight safely:  A safe and healthy way to lose weight is to eat fewer calories and get regular exercise. You can lose up about 1 pound a week by decreasing the number of calories you eat by 500 calories each day.   Healthy meal plan for weight management:  A healthy meal plan includes a variety of foods, contains fewer calories, and helps you stay healthy. A healthy meal plan includes the following:  Eat whole-grain foods more often.  A healthy meal plan should contain fiber. Fiber is the part of grains, fruits, and vegetables that is not broken down by your body. Whole-grain foods are healthy and provide extra fiber in your diet. Some examples of whole-grain foods are whole-wheat breads and pastas, oatmeal, brown rice, and bulgur.  Eat a variety of vegetables every day.  Include dark, leafy greens such as spinach, kale, lee ann greens, and mustard greens. Eat yellow and orange vegetables such as carrots, sweet potatoes, and winter squash.   Eat a variety of fruits every day.  Choose fresh or canned fruit (canned in its own juice or light syrup) instead of juice. Fruit juice has very little or no fiber.  Eat low-fat dairy foods.  Drink fat-free (skim) milk or 1% milk. Eat fat-free yogurt and low-fat cottage cheese. Try low-fat cheeses such as mozzarella and other reduced-fat cheeses.  Choose meat and other protein foods that are low in fat.  Choose beans or other legumes such as split peas or lentils. Choose fish, skinless poultry  (chicken or turkey), or lean cuts of red meat (beef or pork). Before you cook meat or poultry, cut off any visible fat.   Use less fat and oil.  Try baking foods instead of frying them. Add less fat, such as margarine, sour cream, regular salad dressing and mayonnaise to foods. Eat fewer high-fat foods. Some examples of high-fat foods include french fries, doughnuts, ice cream, and cakes.  Eat fewer sweets.  Limit foods and drinks that are high in sugar. This includes candy, cookies, regular soda, and sweetened drinks.  Exercise:  Exercise at least 30 minutes per day on most days of the week. Some examples of exercise include walking, biking, dancing, and swimming. You can also fit in more physical activity by taking the stairs instead of the elevator or parking farther away from stores. Ask your healthcare provider about the best exercise plan for you.      © Copyright [employer] 2018 Information is for End User's use only and may not be sold, redistributed or otherwise used for commercial purposes. All illustrations and images included in CareNotes® are the copyrighted property of [employer] or [employer]

## 2024-06-05 DIAGNOSIS — I48.91 ATRIAL FIBRILLATION WITH RVR (HCC): ICD-10-CM

## 2024-06-07 ENCOUNTER — TELEPHONE (OUTPATIENT)
Age: 69
End: 2024-06-07

## 2024-06-07 NOTE — TELEPHONE ENCOUNTER
Nitza called in stating her husbands new insurance should be listed as secondary and Medicare A & B as primary. Made switch in pts chart.

## 2024-06-10 ENCOUNTER — OFFICE VISIT (OUTPATIENT)
Dept: FAMILY MEDICINE CLINIC | Facility: CLINIC | Age: 69
End: 2024-06-10
Payer: MEDICARE

## 2024-06-10 VITALS
TEMPERATURE: 97.4 F | HEART RATE: 95 BPM | DIASTOLIC BLOOD PRESSURE: 70 MMHG | OXYGEN SATURATION: 98 % | HEIGHT: 67 IN | WEIGHT: 220 LBS | BODY MASS INDEX: 34.53 KG/M2 | RESPIRATION RATE: 16 BRPM | SYSTOLIC BLOOD PRESSURE: 122 MMHG

## 2024-06-10 DIAGNOSIS — H61.23 BILATERAL IMPACTED CERUMEN: Primary | ICD-10-CM

## 2024-06-10 PROCEDURE — 69209 REMOVE IMPACTED EAR WAX UNI: CPT | Performed by: PHYSICIAN ASSISTANT

## 2024-06-10 PROCEDURE — 99214 OFFICE O/P EST MOD 30 MIN: CPT | Performed by: PHYSICIAN ASSISTANT

## 2024-06-10 RX ORDER — NEOMYCIN SULFATE, POLYMYXIN B SULFATE AND HYDROCORTISONE 10; 3.5; 1 MG/ML; MG/ML; [USP'U]/ML
3 SUSPENSION/ DROPS AURICULAR (OTIC) EVERY 6 HOURS SCHEDULED
Qty: 10 ML | Refills: 0 | Status: SHIPPED | OUTPATIENT
Start: 2024-06-10

## 2024-06-10 NOTE — PROGRESS NOTES
Assessment/Plan:     Diagnoses and all orders for this visit:    Bilateral impacted cerumen  -     neomycin-polymyxin-hydrocortisone (CORTISPORIN) 0.35%-10,000 units/mL-1% otic suspension; Administer 3 drops into the left ear every 6 (six) hours    Other orders  -     Ear cerumen removal          Subjective:      Patient ID: Nitza Vitale is a 68 y.o. female.    Patient presents in the office with bilateral ear walking.  She has decreased hearing.  There is no pain or discharge        The following portions of the patient's history were reviewed and updated as appropriate: She   Patient Active Problem List    Diagnosis Date Noted    DOMENIC (obstructive sleep apnea) 03/13/2024    Chronic diastolic congestive heart failure (HCC) 02/23/2023    Edema of both lower legs 09/07/2022    Prediabetes 10/26/2021    DOMENIC on CPAP     Hyponatremia 11/15/2018    Other persistent atrial fibrillation (HCC) 11/15/2018    Long-term use of high-risk medication 09/13/2018    Extrapyramidal reaction 05/02/2018    Bipolar I disorder, most recent episode mixed, in full remission ([facility]) 06/24/2013     Current Outpatient Medications   Medication Sig Dispense Refill    acetaminophen (TYLENOL) 325 mg tablet Take 650 mg by mouth if needed for mild pain      apixaban (Eliquis) 5 mg Take 1 tablet (5 mg total) by mouth 2 (two) times a day 180 tablet 3    ascorbic acid (VITAMIN C) 500 mg tablet Take 500 mg by mouth daily      benztropine (COGENTIN) 1 mg tablet Take 1 tablet (1 mg total) by mouth 2 (two) times a day 180 tablet 2    divalproex sodium (DEPAKOTE ER) 500 mg 24 hr tablet Take 1 tablet (500 mg total) by mouth every evening 90 tablet 2    furosemide (LASIX) 20 mg tablet Take 1 tablet (20 mg total) by mouth 2 (two) times a day 180 tablet 3    metoprolol tartrate (LOPRESSOR) 50 mg tablet Take 1 tablet (50 mg total) by mouth every 12 (twelve) hours 180 tablet 3    Multiple Vitamin (MULTI-VITAMIN DAILY) TABS Take 1 tablet by mouth daily       neomycin-polymyxin-hydrocortisone (CORTISPORIN) 0.35%-10,000 units/mL-1% otic suspension Administer 3 drops into the left ear every 6 (six) hours 10 mL 0    potassium chloride (MICRO-K) 10 MEQ CR capsule Take 1 capsule (10 mEq total) by mouth 2 (two) times a day 180 capsule 3    risperiDONE (RisperDAL) 1 mg tablet Take 1 tablet (1 mg total) by mouth every evening 90 tablet 2     No current facility-administered medications for this visit.     She is allergic to levaquin [levofloxacin], ampicillin, and clindamycin..    Review of Systems   HENT:          Bilat   ears  clogged   Respiratory:  Negative for cough.          Objective:        Physical Exam  Vitals and nursing note reviewed.   Constitutional:       General: She is not in acute distress.     Appearance: She is obese. She is not ill-appearing.   HENT:      Head: Normocephalic and atraumatic.      Right Ear: There is impacted cerumen.      Left Ear: There is impacted cerumen.   Eyes:      Conjunctiva/sclera: Conjunctivae normal.   Pulmonary:      Effort: Pulmonary effort is normal.   Neurological:      Mental Status: She is alert.     Ear cerumen removal    Date/Time: 6/10/2024 3:40 PM    Performed by: Lisha Kamara PA-C  Authorized by: Lisha Kamara PA-C  Universal Protocol:  Consent: Verbal consent obtained. Written consent not obtained.  Consent given by: patient  Patient understanding: patient states understanding of the procedure being performed  Patient identity confirmed: verbally with patient    Patient location:  Other (comment) (office)  Indications / Diagnosis:  Decreased  hearing  and  bilateral  cerumen  impaction  Procedure details:     Local anesthetic:  None    Location:  L ear and R ear    Procedure type: irrigation only      Approach:  External  Post-procedure details:     Complication:  Bleeding (left  ear  canal)    Hearing quality:  Improved    Patient tolerance of procedure:  Tolerated well, no immediate complications  Comments:       Tm's  imntact  a fter  irrigation/   bledding left  ear  calal   wall.  Stooped.

## 2024-07-25 ENCOUNTER — TELEPHONE (OUTPATIENT)
Age: 69
End: 2024-07-25

## 2024-07-25 NOTE — TELEPHONE ENCOUNTER
Patient called in and requested for Lisha to Please place an order for a DXA Bone Density for Spine , Pelvis and hip.    Please let patient know when order is in place so that she can schedule her Bone Density.

## 2024-07-26 DIAGNOSIS — Z12.31 ENCOUNTER FOR SCREENING MAMMOGRAM FOR MALIGNANT NEOPLASM OF BREAST: ICD-10-CM

## 2024-07-26 DIAGNOSIS — Z78.0 POST-MENOPAUSE: Primary | ICD-10-CM

## 2024-07-31 ENCOUNTER — APPOINTMENT (OUTPATIENT)
Dept: LAB | Facility: MEDICAL CENTER | Age: 69
End: 2024-07-31
Payer: MEDICARE

## 2024-07-31 LAB
ALBUMIN SERPL BCG-MCNC: 3.9 G/DL (ref 3.5–5)
ALP SERPL-CCNC: 46 U/L (ref 34–104)
ALT SERPL W P-5'-P-CCNC: 13 U/L (ref 7–52)
ANION GAP SERPL CALCULATED.3IONS-SCNC: 11 MMOL/L (ref 4–13)
AST SERPL W P-5'-P-CCNC: 22 U/L (ref 13–39)
BASOPHILS # BLD AUTO: 0.07 THOUSANDS/ÂΜL (ref 0–0.1)
BASOPHILS NFR BLD AUTO: 1 % (ref 0–1)
BILIRUB SERPL-MCNC: 0.91 MG/DL (ref 0.2–1)
BUN SERPL-MCNC: 18 MG/DL (ref 5–25)
CALCIUM SERPL-MCNC: 9.3 MG/DL (ref 8.4–10.2)
CHLORIDE SERPL-SCNC: 99 MMOL/L (ref 96–108)
CHOLEST SERPL-MCNC: 168 MG/DL
CO2 SERPL-SCNC: 28 MMOL/L (ref 21–32)
CREAT SERPL-MCNC: 0.64 MG/DL (ref 0.6–1.3)
EOSINOPHIL # BLD AUTO: 0.09 THOUSAND/ÂΜL (ref 0–0.61)
EOSINOPHIL NFR BLD AUTO: 1 % (ref 0–6)
ERYTHROCYTE [DISTWIDTH] IN BLOOD BY AUTOMATED COUNT: 14.1 % (ref 11.6–15.1)
EST. AVERAGE GLUCOSE BLD GHB EST-MCNC: 128 MG/DL
GFR SERPL CREATININE-BSD FRML MDRD: 92 ML/MIN/1.73SQ M
GLUCOSE P FAST SERPL-MCNC: 79 MG/DL (ref 65–99)
HBA1C MFR BLD: 6.1 %
HCT VFR BLD AUTO: 39.2 % (ref 34.8–46.1)
HDLC SERPL-MCNC: 46 MG/DL
HGB BLD-MCNC: 12.6 G/DL (ref 11.5–15.4)
IMM GRANULOCYTES # BLD AUTO: 0.02 THOUSAND/UL (ref 0–0.2)
IMM GRANULOCYTES NFR BLD AUTO: 0 % (ref 0–2)
LDLC SERPL CALC-MCNC: 101 MG/DL (ref 0–100)
LYMPHOCYTES # BLD AUTO: 1.73 THOUSANDS/ÂΜL (ref 0.6–4.47)
LYMPHOCYTES NFR BLD AUTO: 27 % (ref 14–44)
MCH RBC QN AUTO: 29.2 PG (ref 26.8–34.3)
MCHC RBC AUTO-ENTMCNC: 32.1 G/DL (ref 31.4–37.4)
MCV RBC AUTO: 91 FL (ref 82–98)
MONOCYTES # BLD AUTO: 0.6 THOUSAND/ÂΜL (ref 0.17–1.22)
MONOCYTES NFR BLD AUTO: 9 % (ref 4–12)
NEUTROPHILS # BLD AUTO: 3.86 THOUSANDS/ÂΜL (ref 1.85–7.62)
NEUTS SEG NFR BLD AUTO: 62 % (ref 43–75)
NONHDLC SERPL-MCNC: 122 MG/DL
NRBC BLD AUTO-RTO: 0 /100 WBCS
PLATELET # BLD AUTO: 208 THOUSANDS/UL (ref 149–390)
PMV BLD AUTO: 11.6 FL (ref 8.9–12.7)
POTASSIUM SERPL-SCNC: 4.4 MMOL/L (ref 3.5–5.3)
PROT SERPL-MCNC: 6.9 G/DL (ref 6.4–8.4)
RBC # BLD AUTO: 4.31 MILLION/UL (ref 3.81–5.12)
SODIUM SERPL-SCNC: 138 MMOL/L (ref 135–147)
TRIGL SERPL-MCNC: 105 MG/DL
WBC # BLD AUTO: 6.37 THOUSAND/UL (ref 4.31–10.16)

## 2024-08-07 ENCOUNTER — PATIENT MESSAGE (OUTPATIENT)
Dept: FAMILY MEDICINE CLINIC | Facility: CLINIC | Age: 69
End: 2024-08-07
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
## 2024-09-07 ENCOUNTER — PATIENT MESSAGE (OUTPATIENT)
Dept: FAMILY MEDICINE CLINIC | Facility: CLINIC | Age: 69
End: 2024-09-07

## 2024-09-10 ENCOUNTER — PATIENT MESSAGE (OUTPATIENT)
Dept: FAMILY MEDICINE CLINIC | Facility: CLINIC | Age: 69
End: 2024-09-10

## 2024-09-16 ENCOUNTER — OFFICE VISIT (OUTPATIENT)
Dept: PSYCHIATRY | Facility: CLINIC | Age: 69
End: 2024-09-16
Payer: MEDICARE

## 2024-09-16 VITALS
WEIGHT: 224 LBS | HEART RATE: 84 BPM | DIASTOLIC BLOOD PRESSURE: 79 MMHG | SYSTOLIC BLOOD PRESSURE: 125 MMHG | BODY MASS INDEX: 33.95 KG/M2 | HEIGHT: 68 IN

## 2024-09-16 DIAGNOSIS — G25.9 EXTRAPYRAMIDAL REACTION: Chronic | ICD-10-CM

## 2024-09-16 DIAGNOSIS — F31.78 BIPOLAR I DISORDER, MOST RECENT EPISODE MIXED, IN FULL REMISSION (HCC): Primary | Chronic | ICD-10-CM

## 2024-09-16 DIAGNOSIS — Z79.899 LONG-TERM USE OF HIGH-RISK MEDICATION: Chronic | ICD-10-CM

## 2024-09-16 PROCEDURE — G2211 COMPLEX E/M VISIT ADD ON: HCPCS | Performed by: PSYCHIATRY & NEUROLOGY

## 2024-09-16 PROCEDURE — 99214 OFFICE O/P EST MOD 30 MIN: CPT | Performed by: PSYCHIATRY & NEUROLOGY

## 2024-09-16 PROCEDURE — 90833 PSYTX W PT W E/M 30 MIN: CPT | Performed by: PSYCHIATRY & NEUROLOGY

## 2024-09-16 RX ORDER — RISPERIDONE 1 MG/1
1 TABLET ORAL EVERY EVENING
Qty: 30 TABLET | Refills: 4 | Status: SHIPPED | OUTPATIENT
Start: 2024-09-16 | End: 2025-02-13

## 2024-09-16 RX ORDER — BENZTROPINE MESYLATE 1 MG/1
1 TABLET ORAL 2 TIMES DAILY
Qty: 60 TABLET | Refills: 4 | Status: SHIPPED | OUTPATIENT
Start: 2024-09-16 | End: 2025-02-13

## 2024-09-16 RX ORDER — DIVALPROEX SODIUM 500 MG/1
500 TABLET, FILM COATED, EXTENDED RELEASE ORAL EVERY EVENING
Qty: 30 TABLET | Refills: 4 | Status: SHIPPED | OUTPATIENT
Start: 2024-09-16 | End: 2025-02-13

## 2024-09-16 NOTE — BH TREATMENT PLAN
"TREATMENT PLAN (Medication Management Only)        [facility]    Name/Date of Birth/MRN:  Nitza Vitale 68 y.o. 1955 MRN: [id number]  Date of Treatment Plan: September 16, 2024  Diagnosis/Diagnoses:   1. Bipolar I disorder, most recent episode mixed, in full remission (HCC)    2. Extrapyramidal reaction    3. Long-term use of high-risk medication      Strengths/Personal Resources for Self-Care: trying to take care of myself so I can take care of others.  Area/Areas of need (in own words): \"getting more done as life allows\".  1. Long Term Goal:   maintain control of anxiety, maintain control of mood stability  Target Date: 4 months - 1/16/2025  Person/Persons responsible for completion of goal: Nitza  2.  Short Term Objective (s) - How will we reach this goal?:   A.  Provider new recommended medication/dosage changes and/or continue medication(s): continue current medications as prescribed (Depakote ER, Risperdal, and Cogentin).  B.  N/A.  C.  N/A.  Target Date: 4 months - 1/16/2025  Person/Persons Responsible for Completion of Goal: Nitza   Progress Towards Goals: stable  Treatment Modality: medication management every 4 months  Review due 180 days from date of this plan: 6 months - 3/16/2025  Expected length of service: maintenance unless revised  My Physician/PA/NP and I have developed this plan together and I agree to work on the goals and objectives. I understand the treatment goals that were developed for my treatment.  Electronic Signatures: on file (unless signed below)    Mouna Brewster MD 09/16/24  "

## 2024-09-16 NOTE — ASSESSMENT & PLAN NOTE
Continue Depakote  mg every evening to help with mood stabilization  Continue Risperdal 1 mg every evening to help with mood  Orders:    Valproic acid level, total; Future    risperiDONE (RisperDAL) 1 mg tablet; Take 1 tablet (1 mg total) by mouth every evening    divalproex sodium (DEPAKOTE ER) 500 mg 24 hr tablet; Take 1 tablet (500 mg total) by mouth every evening

## 2024-09-16 NOTE — ASSESSMENT & PLAN NOTE
Continue Cogentin 1 mg twice a day to help with extrapyramidal symptoms  Orders:    benztropine (COGENTIN) 1 mg tablet; Take 1 tablet (1 mg total) by mouth 2 (two) times a day

## 2024-09-16 NOTE — ASSESSMENT & PLAN NOTE
Follows with family physician for glucose and lipid monitoring due to current therapy with antipsychotic medication  Monitor valproic acid level, CBC/diff, and CMP every 6 months - new lab slip issued today for valproic acid level  Monitor lipid profile and hemoglobin A1C yearly due to current therapy with antipsychotic medication - gets labs done with PCP  Orders:    Valproic acid level, total; Future

## 2024-09-23 ENCOUNTER — OFFICE VISIT (OUTPATIENT)
Dept: CARDIOLOGY CLINIC | Facility: CLINIC | Age: 69
End: 2024-09-23
Payer: MEDICARE

## 2024-09-23 VITALS
WEIGHT: 224.9 LBS | HEART RATE: 83 BPM | SYSTOLIC BLOOD PRESSURE: 130 MMHG | DIASTOLIC BLOOD PRESSURE: 62 MMHG | BODY MASS INDEX: 34.08 KG/M2 | HEIGHT: 68 IN

## 2024-09-23 DIAGNOSIS — I48.91 ATRIAL FIBRILLATION WITH RVR (HCC): Primary | ICD-10-CM

## 2024-09-23 PROCEDURE — 99214 OFFICE O/P EST MOD 30 MIN: CPT | Performed by: INTERNAL MEDICINE

## 2024-09-23 PROCEDURE — 93000 ELECTROCARDIOGRAM COMPLETE: CPT | Performed by: INTERNAL MEDICINE

## 2024-09-23 NOTE — PROGRESS NOTES
HEART AND VASCULAR  CARDIAC ELECTROPHYSIOLOGY   HEART RHYTHM CENTER  Formerly [facility], 1928–1983    Outpatient f/u  Today's Date: 09/23/24        Patient name: Nitza Vitale  YOB: 1955  Sex: female         Chief Complaint: f/u persistent afib      ASSESSMENT:  Problem List Items Addressed This Visit    None  Visit Diagnoses       Atrial fibrillation with RVR (HCC)    -  Primary    Relevant Orders    POCT ECG    Echo complete w/ contrast if indicated    Basic metabolic panel          67 yo female  1) Persistent afib- Dx Nov 2018 found incidentaly by PCP then, tried DCCV Jan 2019 unclear how long she stayed in NSR after each DCCV .Her last DCCV took 4 shocks to restore NSR, 300J. WEnt back to afib, planned ablation but fell through insurance. Now been 6 years of afib, optiong for rate control    2) [id number]=1     3) Chronic LE edema, worse than baseline, wasn't taking lasix regularly but taking   4) Bipolar on Risperadone and divalproex. QTc 435ms today    5) DOMENIC on CPAP, dx after afib. She reports compliance    PLAN:  1.  Cont rate control eliqius and metoprol  2. Increase lasix 20mg bid daily  3. Check bmp  4. Check echo  Refer to cardiology [place] closer to her home.     Orders Placed This Encounter   Procedures    Basic metabolic panel    POCT ECG    Echo complete w/ contrast if indicated     There are no discontinued medications.      .............................................................................................    HPI/Subjective:    67 yo female  1) Persistent afib- Dx Nov 2018 found incidentaly by PCP then, tried DCCV Jan 2019 unclear how long she stayed in NSR after each DCCV .Her last DCCV took 4 shocks to restore NSR, 300J. WEnt back to afib, planned ablation but fell through insurance. Now been 6 years of afib, optiong for rate control    2) [id number]=1     3) Chronic LE edema, worse than baseline, wasn't taking lasix regularly but taking   4) Bipolar on Risperadone and  "divalproex. QTc 435ms today    5) DOMENIC on CPAP, dx after afib. She reports compliance    Past Medical History:   Diagnosis Date    Atrial fibrillation (HCC) 2019    Bipolar disorder (HCC)     bipolar    Cataracts, bilateral     CHF (congestive heart failure) (HCC)     Leg edema     Prediabetes     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    Sleep apnea     Varicose veins of legs        Allergies   Allergen Reactions    Levaquin [Levofloxacin]      \"heavy legs\"    Ampicillin Rash     Category: Allergy;     Clindamycin Rash     Category: Allergy;      I reviewed the Home Medication list and Allergies in the chart.   Scheduled Meds:  Current Outpatient Medications   Medication Sig Dispense Refill    acetaminophen (TYLENOL) 325 mg tablet Take 650 mg by mouth if needed for mild pain      apixaban (Eliquis) 5 mg Take 1 tablet (5 mg total) by mouth 2 (two) times a day 180 tablet 3    ascorbic acid (VITAMIN C) 500 mg tablet Take 500 mg by mouth daily      benztropine (COGENTIN) 1 mg tablet Take 1 tablet (1 mg total) by mouth 2 (two) times a day 60 tablet 4    divalproex sodium (DEPAKOTE ER) 500 mg 24 hr tablet Take 1 tablet (500 mg total) by mouth every evening 30 tablet 4    furosemide (LASIX) 20 mg tablet Take 1 tablet (20 mg total) by mouth 2 (two) times a day 180 tablet 3    metoprolol tartrate (LOPRESSOR) 50 mg tablet Take 1 tablet (50 mg total) by mouth every 12 (twelve) hours 180 tablet 3    Multiple Vitamin (MULTI-VITAMIN DAILY) TABS Take 1 tablet by mouth daily      potassium chloride (MICRO-K) 10 MEQ CR capsule Take 1 capsule (10 mEq total) by mouth 2 (two) times a day 180 capsule 3    risperiDONE (RisperDAL) 1 mg tablet Take 1 tablet (1 mg total) by mouth every evening 30 tablet 4     No current facility-administered medications for this visit.     PRN Meds:.        Family History   Problem Relation Age of Onset    Psychiatric Illness Maternal Aunt     Breast cancer Maternal Aunt     Stroke Mother         CVA    Heart " failure Mother     Diabetes Mother     Hypertension Mother     Thyroid disease Mother     Gallbladder disease Mother         gallstones    Diabetes Maternal Grandmother     No Known Problems Maternal Grandfather     No Known Problems Paternal Grandmother     No Known Problems Paternal Grandfather     No Known Problems Maternal Aunt     No Known Problems Maternal Aunt     Substance Abuse Neg Hx     Suicide Attempts Neg Hx        Social History     Socioeconomic History    Marital status: /Civil Union     Spouse name: Not on file    Number of children: 1    Years of education: 12    Highest education level: 12th grade   Occupational History    Occupation: unemployed   Tobacco Use    Smoking status: Former     Current packs/day: 0.00     Average packs/day: 1 pack/day for 20.0 years (20.0 ttl pk-yrs)     Types: Cigarettes     Start date:      Quit date:      Years since quittin.7    Smokeless tobacco: Never   Vaping Use    Vaping status: Never Used   Substance and Sexual Activity    Alcohol use: No    Drug use: No    Sexual activity: Yes     Partners: Male   Other Topics Concern    Not on file   Social History Narrative    Education: high school graduate    Learning Disabilities: none    Marital History:     Children: 1 adult son    Living Arrangement: lives in home with     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, unemployed    Functioning Relationships: good support system,  is supportive    Legal History: none     History: None    Always uses seat belt      Social Determinants of Health     Financial Resource Strain: Medium Risk (2024)    Overall Financial Resource Strain (CARDIA)     Difficulty of Paying Living Expenses: Somewhat hard   Food Insecurity: No Food Insecurity (2024)    Hunger Vital Sign     Worried About Running Out of Food in the Last Year: Never true     Ran Out of Food in the Last Year: Never true   Transportation Needs: No  "Transportation Needs (9/16/2024)    PRAPARE - Transportation     Lack of Transportation (Medical): No     Lack of Transportation (Non-Medical): No   Physical Activity: Sufficiently Active (9/16/2024)    Exercise Vital Sign     Days of Exercise per Week: 7 days     Minutes of Exercise per Session: 60 min   Stress: Stress Concern Present (9/16/2024)    [employer] [place] [employer] - Occupational Stress Questionnaire     Feeling of Stress : To some extent   Social Connections: Moderately Isolated (9/16/2024)    Social Connection and Isolation Panel [NHANES]     Frequency of Communication with Friends and Family: Twice a week     Frequency of Social Gatherings with Friends and Family: Twice a week     Attends Orthodoxy Services: Never     Active Member of Clubs or Organizations: No     Attends Club or Organization Meetings: Never     Marital Status:    Intimate Partner Violence: Not At Risk (9/16/2024)    Humiliation, Afraid, Rape, and Kick questionnaire     Fear of Current or Ex-Partner: No     Emotionally Abused: No     Physically Abused: No     Sexually Abused: No   Housing Stability: Low Risk  (9/16/2024)    Housing Stability Vital Sign     Unable to Pay for Housing in the Last Year: No     Number of Times Moved in the Last Year: 0     Homeless in the Last Year: No       OBJECTIVE:    /62 (BP Location: Left arm, Patient Position: Sitting, Cuff Size: Standard)   Pulse 83   Ht 5' 7.5\" (1.715 m)   Wt 102 kg (224 lb 14.4 oz)   BMI 34.70 kg/m²   Vitals:    09/23/24 1637   Weight: 102 kg (224 lb 14.4 oz)     /62 (BP Location: Left arm, Patient Position: Sitting, Cuff Size: Standard)   Pulse 83   Ht 5' 7.5\" (1.715 m)   Wt 102 kg (224 lb 14.4 oz)   BMI 34.70 kg/m²   Vitals:    09/23/24 1637   Weight: 102 kg (224 lb 14.4 oz)     /62 (BP Location: Left arm, Patient Position: Sitting, Cuff Size: Standard)   Pulse 83   Ht 5' 7.5\" (1.715 m)   Wt 102 kg (224 lb 14.4 oz)   BMI 34.70 " "kg/m²   Vitals:    09/23/24 1637   Weight: 102 kg (224 lb 14.4 oz)     /62 (BP Location: Left arm, Patient Position: Sitting, Cuff Size: Standard)   Pulse 83   Ht 5' 7.5\" (1.715 m)   Wt 102 kg (224 lb 14.4 oz)   BMI 34.70 kg/m²   Vitals:    09/23/24 1637   Weight: 102 kg (224 lb 14.4 oz)     GEN: No acute distress, Alert and oriented, well appearing  HEENT:External ears normal, wearing a mask.   EYES: Pupils equal, sclera anicteric, midline, normal conjuctiva  NECK: No JVD, supple, no obvious masses or thryomegaly or goiter  CARDIOVASCULAR:  RRR, No murmur, rub, gallops S1,S2  LUNGS: Clear To auscultation bilaterally, normal effort, no rales, rhonchi, crackles   ABDOMEN:  nondistended,  without obvious organomegaly or ascites  EXTREMITIES/VASCULAR:  No edema. warm an well perfused.  PSYCH: Normal Affect,  linear speech pattern without evidence of psychosis.   NEURO: Grossly intact, moving all extremiteis equal, face symmetric, alert and responsive, no obvious focal defecits   GAIT:  Ambulates normally without difficulty  HEME: No bleeding, bruising, petechia, purpura   SKIN: No significant rashes on visibile skin, warm, no diaphoresis or pallor.              Lab Results:       LABS:      Chemistry        Component Value Date/Time     (L) 01/08/2016 1424    K 4.4 07/31/2024 1438    K 4.3 01/08/2016 1424    CL 99 07/31/2024 1438     01/08/2016 1424    CO2 28 07/31/2024 1438    CO2 30 01/08/2016 1424    BUN 18 07/31/2024 1438    BUN 16 01/08/2016 1424    CREATININE 0.64 07/31/2024 1438    CREATININE 0.71 01/08/2016 1424        Component Value Date/Time    CALCIUM 9.3 07/31/2024 1438    CALCIUM 8.9 01/08/2016 1424    ALKPHOS 46 07/31/2024 1438    ALKPHOS 59 01/08/2016 1424    AST 22 07/31/2024 1438    AST 15 01/08/2016 1424    ALT 13 07/31/2024 1438    ALT 19 01/08/2016 1424    BILITOT 0.56 01/08/2016 1424            No results found for: \"CHOL\"  Lab Results   Component Value Date    HDL 46 (L) " "2024    HDL 48 (L) 2023    HDL 55 2023     Lab Results   Component Value Date    LDLCALC 101 (H) 2024    LDLCALC 111 (H) 2023    LDLCALC 103 (H) 2023     Lab Results   Component Value Date    TRIG 105 2024    TRIG 86 2023    TRIG 80 2023     No results found for: \"CHOLHDL\"    IMAGING: No results found.     Cardiac testing:   Results for orders placed during the hospital encounter of 11/15/18   Echo complete with contrast if indicated    [facility]  [address] [place], [place] [id number] [phone]    Transthoracic Echocardiogram  2D, M-mode, Doppler, and Color Doppler    Study date:  2018    Patient: MOHINDER BALDWIN  MR number: [id number]  Account number: [id number]  : 1955  Age: 62 years  Gender: Female  Status: Inpatient  Location: Bedside  Height: 70 in  Weight: 194.7 lb  BP: 105/ 65 mmHg    Indications: Atrial fibrillation.    Diagnoses: I48.0 - Atrial fibrillation    Sonographer:  Joanne Castaneda RDCS  Primary Physician:  Mary Hines MD  Referring Physician:  CINDY Mehta  Group:  [facility]  Interpreting Physician:  Balbir Holden MD    SUMMARY    LEFT VENTRICLE:  Systolic function was normal. Ejection fraction was estimated to be 65 %.  There were no regional wall motion abnormalities.    LEFT ATRIUM:  The atrium was mildly dilated.    RIGHT ATRIUM:  The atrium was mildly dilated.    MITRAL VALVE:  There was mild annular calcification.  There was trace regurgitation.    TRICUSPID VALVE:  There was mild regurgitation.  Pulmonary artery systolic pressure was within the normal range.    HISTORY: PRIOR HISTORY: AFIB    PROCEDURE: The procedure was performed at the bedside. This was a routine study. The transthoracic approach was used. The study included complete 2D imaging, M-mode, complete spectral Doppler, and color Doppler. The heart rate was 50 bpm,  at the start of the " study. Images were obtained from the parasternal, apical, subcostal, and suprasternal notch acoustic windows. Image quality was adequate.    LEFT VENTRICLE: Size was normal. Systolic function was normal. Ejection fraction was estimated to be 65 %. There were no regional wall motion abnormalities. Wall thickness was normal. DOPPLER: Left ventricular diastolic function parameters  were abnormal. There was no evidence of elevated ventricular filling pressure by Doppler parameters.    RIGHT VENTRICLE: The size was normal. Systolic function was normal. Wall thickness was normal.    LEFT ATRIUM: The atrium was mildly dilated.    RIGHT ATRIUM: The atrium was mildly dilated.    MITRAL VALVE: There was mild annular calcification. There was mild diffuse thickening. There was normal leaflet separation. DOPPLER: The transmitral velocity was within the normal range. There was no evidence for stenosis. There was trace  regurgitation.    AORTIC VALVE: The valve was trileaflet. Leaflets exhibited normal thickness, normal cuspal separation, and sclerosis. DOPPLER: Transaortic velocity was within the normal range. There was no evidence for stenosis. There was no significant  regurgitation.    TRICUSPID VALVE: The valve structure was normal. There was normal leaflet separation. DOPPLER: The transtricuspid velocity was within the normal range. There was no evidence for stenosis. There was mild regurgitation. Pulmonary artery  systolic pressure was within the normal range.    PULMONIC VALVE: Leaflets exhibited normal thickness, no calcification, and normal cuspal separation. DOPPLER: The transpulmonic velocity was within the normal range. There was no significant regurgitation.    PERICARDIUM: There was no pericardial effusion. The pericardium was normal in appearance.    AORTA: The root exhibited normal size.    SYSTEMIC VEINS: IVC: The inferior vena cava was not well visualized.    PULMONARY VEINS: DOPPLER: Doppler signals were not  recordable in the pulmonary vein(s).    SYSTEM MEASUREMENT TABLES    2D  %FS: 33.42 %  AV Diam: 2.94 cm  EDV(Teich): 75.34 ml  EF(Teich): 62.58 %  ESV(Teich): 28.19 ml  IVSd: 0.68 cm  LA Area: 18.29 cm2  LA Diam: 2.84 cm  LVEDV MOD A4C: 94.08 ml  LVEF MOD A4C: 73.89 %  LVESV MOD A4C: 24.57 ml  LVIDd: 4.13 cm  LVIDs: 2.75 cm  LVLd A4C: 7.89 cm  LVLs A4C: 6 cm  LVPWd: 0.88 cm  RA Area: 20.69 cm2  RVIDd: 3.26 cm  SV MOD A4C: 69.51 ml  SV(Teich): 47.15 ml    CW  TR MaxP.84 mmHg  TR Vmax: 2.28 m/s    MM  TAPSE: 2.54 cm    [facility]    Prepared and electronically signed by    Balbir Holden MD  Signed 2018 13:26:56       No results found for this or any previous visit.  No results found for this or any previous visit.  Results for orders placed during the hospital encounter of 19   NM myocardial perfusion spect (stress and/or rest)    [address] [address] [phone]    Rest/Stress Gated SPECT Myocardial Perfusion Imaging After Regadenoson and Exercise    Patient: MOHINDER BALDWIN  MR number: [id number]  Account number: [id number]  : 1955  Age: 63 years  Gender: Female  Status: Outpatient  Location: [facility]  Height: 70 in  Weight: 197 lb  BP: 130/ 82 mmHg    Allergies: LEVOFLOXACIN, AMPICILLIN, CLINDAMYCIN    Diagnosis: I48.0 - Atrial fibrillation    Primary Physician:  ALVIN HATFIELD  RN:  Nichole Gramajo RN  Referring Physician:  Balbir Holden MD  Technician:  Nely Smyth  Group:  [facility]  Report Prepared By::  Nichole Gramajo RN  Interpreting Physician:  Darien Albert MD    INDICATIONS: Coronary artery disease.    HISTORY: The patient is a 63 year old  female. Chest pain status: no chest pain. Other symptoms: dyspnea. Cardiovascular history: arrhythmia. Medications: a beta blocker.    PHYSICAL EXAM: Baseline physical exam  screening: normal and no wheezes audible.    REST ECG: Atrial fibrillation.    PROCEDURE: The study was performed in the [facility]. A regadenoson infusion pharmacologic stress test was performed. Treadmill exercise testing was performed, using the Ghassan protocol. Gated SPECT myocardial  perfusion imaging was performed after stress. Systolic blood pressure was 130 mmHg, at the start of the study. Diastolic blood pressure was 82 mmHg, at the start of the study. The heart rate was 96 bpm, at the start of the study. IV double  checked.    GHASSAN PROTOCOL:  HR bpm SBP mmHg DBP mmHg Symptoms  Baseline 96 130 82 none  Stage 1 184 210 80 moderate dyspnea    Regadenoson protocol:  HR bpm SBP mmHg DBP mmHg Symptoms  Baseline 109 162 80 none  2 min 105 128 70 none  4 min 109 120 70 none    STRESS SUMMARY: Duration of pharmacologic stress was 3 min and 0 sec. Maximal heart rate during stress was 210 bpm ( 133 % of maximal predicted heart rate) with limited exercise and thus she was switched to a chemical stress test. The  rate-pressure product for the peak heart rate and blood pressure was 02292. There was no chest pain during stress. The stress test was terminated due to protocol completion. Pre oxygen saturation: 99 %. Peak oxygen saturation: 99 %.  Arrhythmia during stress: atrial fibrillation. The stress ECG was non-diagnostic.    ISOTOPE ADMINISTRATION:  Resting isotope administration Stress isotope administration  Agent Tetrofosmin Tetrofosmin  Dose 10.6 mCi 32.6 mCi  Date 03/11/2019 03/11/2019  Injection time 12:26 14:00  Injection-image interval 42 min 46 min    The radiopharmaceutical was injected at the peak effect of pharmacologic stress.    MYOCARDIAL PERFUSION IMAGING:  The image quality was good. Left ventricular size was normal. The TID ratio was 0.99.    PERFUSION DEFECTS:  -  There were no perfusion defects.    GATED SPECT:  The calculated left ventricular ejection fraction was 78 %.  There was no left ventricular regional abnormality.    SUMMARY:  -  Stress results: Maximal heart rate during stress was 210 bpm ( 133 % of maximal predicted heart rate) with limited exercise and thus she was switched to a chemical stress test. Target heart rate was achieved. There was no chest pain  during stress.  -  ECG conclusions: Arrhythmia during stress: atrial fibrillation. The stress ECG was non-diagnostic.  -  Perfusion imaging: There were no perfusion defects.  -  Gated SPECT: The calculated left ventricular ejection fraction was 78 %. There was no left ventricular regional abnormality.    IMPRESSIONS: Normal study after pharmacologic vasodilation.  Myocardial perfusion imaging was normal at rest and with stress.    Prepared and signed by    Darien Albert MD  Signed 03/11/2019 15:58:05             I reviewed and interpreted the following LABS/EKG/TELE/IMAGING and below is summary of my interpretation (if data available):    LABS:normal renal function. Normal lipids. Normal cbc    Current EKG and Rhythm Strip:Afib HR 72bpm.

## 2024-09-25 ENCOUNTER — CLINICAL SUPPORT (OUTPATIENT)
Dept: FAMILY MEDICINE CLINIC | Facility: CLINIC | Age: 69
End: 2024-09-25
Payer: MEDICARE

## 2024-09-25 ENCOUNTER — LAB (OUTPATIENT)
Dept: LAB | Facility: MEDICAL CENTER | Age: 69
End: 2024-09-25
Payer: MEDICARE

## 2024-09-25 DIAGNOSIS — Z79.899 LONG-TERM USE OF HIGH-RISK MEDICATION: Chronic | ICD-10-CM

## 2024-09-25 DIAGNOSIS — Z23 NEED FOR COVID-19 VACCINE: Primary | ICD-10-CM

## 2024-09-25 DIAGNOSIS — F31.78 BIPOLAR I DISORDER, MOST RECENT EPISODE MIXED, IN FULL REMISSION (HCC): Chronic | ICD-10-CM

## 2024-09-25 LAB — VALPROATE SERPL-MCNC: 64 UG/ML (ref 50–100)

## 2024-09-25 PROCEDURE — 80164 ASSAY DIPROPYLACETIC ACD TOT: CPT

## 2024-09-25 PROCEDURE — 36415 COLL VENOUS BLD VENIPUNCTURE: CPT

## 2024-09-25 PROCEDURE — 90480 ADMN SARSCOV2 VAC 1/ONLY CMP: CPT

## 2024-09-25 PROCEDURE — 91320 SARSCV2 VAC 30MCG TRS-SUC IM: CPT

## 2024-10-11 ENCOUNTER — TELEPHONE (OUTPATIENT)
Dept: FAMILY MEDICINE CLINIC | Facility: CLINIC | Age: 69
End: 2024-10-11

## 2024-10-11 ENCOUNTER — IMMUNIZATIONS (OUTPATIENT)
Dept: FAMILY MEDICINE CLINIC | Facility: CLINIC | Age: 69
End: 2024-10-11
Payer: MEDICARE

## 2024-10-11 DIAGNOSIS — Z23 ENCOUNTER FOR IMMUNIZATION: Primary | ICD-10-CM

## 2024-10-11 PROCEDURE — 90662 IIV NO PRSV INCREASED AG IM: CPT

## 2024-10-11 PROCEDURE — G0008 ADMIN INFLUENZA VIRUS VAC: HCPCS

## 2024-10-16 NOTE — TELEPHONE ENCOUNTER
Scheduled RSV for patient and  this Friday based on previous message. Please ensure that this vaccine is appropriate.

## 2024-11-08 ENCOUNTER — CLINICAL SUPPORT (OUTPATIENT)
Dept: FAMILY MEDICINE CLINIC | Facility: CLINIC | Age: 69
End: 2024-11-08
Payer: MEDICARE

## 2024-11-08 DIAGNOSIS — Z23 ENCOUNTER FOR IMMUNIZATION: Primary | ICD-10-CM

## 2024-11-08 PROCEDURE — 90471 IMMUNIZATION ADMIN: CPT

## 2024-11-08 PROCEDURE — 90678 RSV VACC PREF BIVALENT IM: CPT

## 2024-11-11 ENCOUNTER — HOSPITAL ENCOUNTER (OUTPATIENT)
Dept: RADIOLOGY | Facility: MEDICAL CENTER | Age: 69
Discharge: HOME | End: 2024-11-11
Payer: MEDICARE

## 2024-11-11 VITALS — BODY MASS INDEX: 34.84 KG/M2 | WEIGHT: 222 LBS | HEIGHT: 67 IN

## 2024-11-11 DIAGNOSIS — Z78.0 POST-MENOPAUSE: ICD-10-CM

## 2024-11-11 DIAGNOSIS — Z12.31 ENCOUNTER FOR SCREENING MAMMOGRAM FOR MALIGNANT NEOPLASM OF BREAST: ICD-10-CM

## 2024-11-11 DIAGNOSIS — Z13.820 SCREENING FOR OSTEOPOROSIS: ICD-10-CM

## 2024-11-11 PROCEDURE — 77067 SCR MAMMO BI INCL CAD: CPT

## 2024-11-11 PROCEDURE — 77080 DXA BONE DENSITY AXIAL: CPT

## 2024-11-11 PROCEDURE — 77063 BREAST TOMOSYNTHESIS BI: CPT

## 2024-11-13 ENCOUNTER — RESULTS FOLLOW-UP (OUTPATIENT)
Dept: FAMILY MEDICINE CLINIC | Facility: CLINIC | Age: 69
End: 2024-11-13

## 2024-12-03 PROBLEM — G47.33 OSA ON CPAP: Status: ACTIVE | Noted: 2024-03-13

## 2024-12-04 ENCOUNTER — TELEPHONE (OUTPATIENT)
Dept: OTHER | Facility: OTHER | Age: 69
End: 2024-12-04

## 2024-12-04 ENCOUNTER — OFFICE VISIT (OUTPATIENT)
Dept: FAMILY MEDICINE CLINIC | Facility: CLINIC | Age: 69
End: 2024-12-04
Payer: MEDICARE

## 2024-12-04 VITALS
HEART RATE: 100 BPM | BODY MASS INDEX: 35.31 KG/M2 | DIASTOLIC BLOOD PRESSURE: 66 MMHG | SYSTOLIC BLOOD PRESSURE: 112 MMHG | HEIGHT: 67 IN | TEMPERATURE: 97.6 F | WEIGHT: 225 LBS | OXYGEN SATURATION: 98 %

## 2024-12-04 DIAGNOSIS — I48.19 OTHER PERSISTENT ATRIAL FIBRILLATION (HCC): Primary | ICD-10-CM

## 2024-12-04 DIAGNOSIS — E66.811 CLASS 1 OBESITY DUE TO EXCESS CALORIES WITH SERIOUS COMORBIDITY AND BODY MASS INDEX (BMI) OF 34.0 TO 34.9 IN ADULT: ICD-10-CM

## 2024-12-04 DIAGNOSIS — E66.01 OBESITY, MORBID (HCC): ICD-10-CM

## 2024-12-04 DIAGNOSIS — I50.32 CHRONIC DIASTOLIC CONGESTIVE HEART FAILURE (HCC): ICD-10-CM

## 2024-12-04 DIAGNOSIS — F31.78 BIPOLAR I DISORDER, MOST RECENT EPISODE MIXED, IN FULL REMISSION (HCC): Chronic | ICD-10-CM

## 2024-12-04 DIAGNOSIS — G47.33 OSA ON CPAP: ICD-10-CM

## 2024-12-04 DIAGNOSIS — E66.09 CLASS 1 OBESITY DUE TO EXCESS CALORIES WITH SERIOUS COMORBIDITY AND BODY MASS INDEX (BMI) OF 34.0 TO 34.9 IN ADULT: ICD-10-CM

## 2024-12-04 DIAGNOSIS — R73.03 PREDIABETES: ICD-10-CM

## 2024-12-04 PROCEDURE — G2211 COMPLEX E/M VISIT ADD ON: HCPCS | Performed by: PHYSICIAN ASSISTANT

## 2024-12-04 PROCEDURE — 99214 OFFICE O/P EST MOD 30 MIN: CPT | Performed by: PHYSICIAN ASSISTANT

## 2024-12-04 NOTE — PROGRESS NOTES
Name: Nitza Vitale      : 1955      MRN: [id number]  Encounter Provider: Lisha Kamara PA-C  Encounter Date: 2024   Encounter department: [facility]  :  Assessment & Plan  Other persistent atrial fibrillation (HCC)  Rate controlled and on Eliquis.  Orders:    CBC and differential    Comprehensive metabolic panel    DOMENIC on CPAP         Chronic diastolic congestive heart failure (HCC)  Wt Readings from Last 3 Encounters:   24 102 kg (225 lb)   24 101 kg (222 lb)   24 102 kg (224 lb 14.4 oz)     Currently well compensated.  Continue current regimen.  Follow-up with cardiology as directed               Bipolar I disorder, most recent episode mixed, in full remission (HCC)  Currently stable on current regimen.  Follow-up with psychiatry       Prediabetes  Diet and exercise. low in carbs and sugar  Orders:    CBC and differential    Comprehensive metabolic panel    Hemoglobin A1C    Class 1 obesity due to excess calories with serious comorbidity and body mass index (BMI) of 34.0 to 34.9 in adult           Obesity, morbid (HCC)                  History of Present Illness     Patient presents in the office for follow-up chronic conditions.  Patient has chronic A-fib and a history of congestive heart failure.  Current regimen includes Eliquis 5 mg twice daily, metoprolol 50 mg twice daily, Lasix 20 mg twice daily and potassium 10 mEq twice daily.  Is seeing cardiology.  She saw the electrophysiologist therapist.  He is currently not a candidate for ablation due to A-fib being longstanding 6 years plus.  Scheduled for echocardiogram and to see the regular cardiologist next month.  Has obstructive sleep apnea and is on CPAP for bipolar disorder patient sees her psychiatrist.  Her current regimen is Depakote  mg daily Cogentin 1 mg twice daily and Risperdal 1 mg a day.  And also is prediabetic.  She is trying to watch her carbs and sugars.  Scenes are  "up-to-date.      Review of Systems   Constitutional:  Negative for activity change and unexpected weight change.   HENT:  Negative for ear pain and sore throat.    Eyes:  Negative for visual disturbance.   Respiratory:  Negative for cough, shortness of breath and wheezing.    Cardiovascular:  Positive for chest pain, palpitations and leg swelling.   Gastrointestinal:  Negative for abdominal pain, blood in stool, constipation, diarrhea, nausea and vomiting.   Genitourinary:  Negative for difficulty urinating.   Musculoskeletal:  Positive for arthralgias. Negative for myalgias.   Skin:  Negative for rash.   Neurological:  Positive for headaches. Negative for dizziness, syncope and light-headedness.   Psychiatric/Behavioral:  Negative for self-injury, sleep disturbance and suicidal ideas. The patient is not nervous/anxious.           Objective   /66 (BP Location: Right arm, Patient Position: Sitting, Cuff Size: Extra-Large)   Pulse 100   Temp 97.6 °F (36.4 °C) (Temporal)   Ht 5' 6.5\" (1.689 m)   Wt 102 kg (225 lb)   SpO2 98%   Breastfeeding No   BMI 35.77 kg/m²      Physical Exam  Vitals and nursing note reviewed.   Constitutional:       General: She is not in acute distress.     Appearance: She is well-developed. She is obese. She is not diaphoretic.   HENT:      Head: Normocephalic and atraumatic.      Right Ear: Tympanic membrane, ear canal and external ear normal.      Left Ear: Tympanic membrane, ear canal and external ear normal.   Eyes:      Conjunctiva/sclera: Conjunctivae normal.      Pupils: Pupils are equal, round, and reactive to light.   Neck:      Thyroid: No thyromegaly.      Vascular: No carotid bruit.   Cardiovascular:      Rate and Rhythm: Normal rate. Rhythm irregular.      Heart sounds: Normal heart sounds. No murmur heard.     No friction rub.   Pulmonary:      Effort: Pulmonary effort is normal. No respiratory distress.      Breath sounds: Normal breath sounds. No wheezing. "   Abdominal:      General: Bowel sounds are normal. There is no distension.      Palpations: Abdomen is soft. There is no mass.      Tenderness: There is no abdominal tenderness.   Musculoskeletal:      Cervical back: Normal range of motion.      Right lower leg: Edema present.      Left lower leg: Edema present.      Comments: Venous  changes  skin  changes   Lymphadenopathy:      Cervical: No cervical adenopathy.   Skin:     General: Skin is warm and dry.   Neurological:      General: No focal deficit present.      Mental Status: She is alert and oriented to person, place, and time.   Psychiatric:         Mood and Affect: Mood normal.         Behavior: Behavior normal.         Thought Content: Thought content normal.         Judgment: Judgment normal.

## 2024-12-04 NOTE — ASSESSMENT & PLAN NOTE
Wt Readings from Last 3 Encounters:   12/04/24 102 kg (225 lb)   11/11/24 101 kg (222 lb)   09/23/24 102 kg (224 lb 14.4 oz)     Currently well compensated.  Continue current regimen.  Follow-up with cardiology as directed

## 2024-12-04 NOTE — ASSESSMENT & PLAN NOTE
Diet and exercise. low in carbs and sugar  Orders:    CBC and differential    Comprehensive metabolic panel    Hemoglobin A1C

## 2024-12-04 NOTE — ASSESSMENT & PLAN NOTE
Rate controlled and on Eliquis.  Orders:    CBC and differential    Comprehensive metabolic panel

## 2024-12-05 NOTE — TELEPHONE ENCOUNTER
Patient calling back to let the office know that her appointment change to 1/29/25 works for patient. She is good with that appointment date.

## 2024-12-19 ENCOUNTER — HOSPITAL ENCOUNTER (OUTPATIENT)
Dept: NON INVASIVE DIAGNOSTICS | Facility: MEDICAL CENTER | Age: 69
Discharge: HOME | End: 2024-12-19
Payer: MEDICARE

## 2024-12-19 VITALS
WEIGHT: 225 LBS | DIASTOLIC BLOOD PRESSURE: 66 MMHG | SYSTOLIC BLOOD PRESSURE: 112 MMHG | HEIGHT: 66 IN | HEART RATE: 100 BPM | BODY MASS INDEX: 36.16 KG/M2

## 2024-12-19 DIAGNOSIS — I48.91 ATRIAL FIBRILLATION WITH RVR (HCC): ICD-10-CM

## 2024-12-19 LAB
AORTIC ROOT: 3.2 CM
APICAL FOUR CHAMBER EJECTION FRACTION: 62 %
ASCENDING AORTA: 3.3 CM
BSA FOR ECHO PROCEDURE: 2.1 M2
FRACTIONAL SHORTENING: 41 (ref 28–44)
INTERVENTRICULAR SEPTUM IN DIASTOLE (PARASTERNAL SHORT AXIS VIEW): 0.9 CM
INTERVENTRICULAR SEPTUM: 0.9 CM (ref 0.6–1.1)
LAAS-AP2: 20 CM2
LAAS-AP4: 19.6 CM2
LEFT ATRIUM SIZE: 3.8 CM
LEFT ATRIUM VOLUME (MOD BIPLANE): 54 ML
LEFT ATRIUM VOLUME INDEX (MOD BIPLANE): 25.6 ML/M2
LEFT INTERNAL DIMENSION IN SYSTOLE: 2.4 CM (ref 2.1–4)
LEFT VENTRICULAR INTERNAL DIMENSION IN DIASTOLE: 4.1 CM (ref 3.5–6)
LEFT VENTRICULAR POSTERIOR WALL IN END DIASTOLE: 0.9 CM
LEFT VENTRICULAR STROKE VOLUME: 54 ML
LVSV (TEICH): 54 ML
RA PRESSURE ESTIMATED: 8 MMHG
RIGHT ATRIUM AREA SYSTOLE A4C: 20.3 CM2
RIGHT VENTRICLE ID DIMENSION: 3.4 CM
RV PSP: 36 MMHG
SL CV LEFT ATRIUM LENGTH A2C: 6.4 CM
SL CV LV EF: 60
SL CV PED ECHO LEFT VENTRICLE DIASTOLIC VOLUME (MOD BIPLANE) 2D: 73 ML
SL CV PED ECHO LEFT VENTRICLE SYSTOLIC VOLUME (MOD BIPLANE) 2D: 20 ML
TR MAX PG: 28 MMHG
TR PEAK VELOCITY: 2.6 M/S
TRICUSPID ANNULAR PLANE SYSTOLIC EXCURSION: 1.9 CM
TRICUSPID VALVE PEAK REGURGITATION VELOCITY: 2.64 M/S

## 2024-12-19 PROCEDURE — 93306 TTE W/DOPPLER COMPLETE: CPT

## 2024-12-19 PROCEDURE — 93306 TTE W/DOPPLER COMPLETE: CPT | Performed by: INTERNAL MEDICINE

## 2024-12-20 ENCOUNTER — RESULTS FOLLOW-UP (OUTPATIENT)
Dept: CARDIOLOGY CLINIC | Facility: CLINIC | Age: 69
End: 2024-12-20

## 2024-12-20 NOTE — TELEPHONE ENCOUNTER
----- Message from Catrachito Lewis MD sent at 12/20/2024  7:35 AM EST -----  Normal Device Function  Let patient know echo looks good.

## 2024-12-23 ENCOUNTER — APPOINTMENT (OUTPATIENT)
Dept: LAB | Facility: MEDICAL CENTER | Age: 69
End: 2024-12-23
Payer: MEDICARE

## 2024-12-23 DIAGNOSIS — I48.91 ATRIAL FIBRILLATION WITH RVR (HCC): ICD-10-CM

## 2024-12-23 LAB
ALBUMIN SERPL BCG-MCNC: 4.6 G/DL (ref 3.5–5)
ALP SERPL-CCNC: 52 U/L (ref 34–104)
ALT SERPL W P-5'-P-CCNC: 15 U/L (ref 7–52)
ANION GAP SERPL CALCULATED.3IONS-SCNC: 7 MMOL/L (ref 4–13)
AST SERPL W P-5'-P-CCNC: 23 U/L (ref 13–39)
BASOPHILS # BLD AUTO: 0.05 THOUSANDS/ÂΜL (ref 0–0.1)
BASOPHILS NFR BLD AUTO: 1 % (ref 0–1)
BILIRUB SERPL-MCNC: 0.97 MG/DL (ref 0.2–1)
BUN SERPL-MCNC: 19 MG/DL (ref 5–25)
CALCIUM SERPL-MCNC: 9.6 MG/DL (ref 8.4–10.2)
CHLORIDE SERPL-SCNC: 98 MMOL/L (ref 96–108)
CO2 SERPL-SCNC: 34 MMOL/L (ref 21–32)
CREAT SERPL-MCNC: 0.7 MG/DL (ref 0.6–1.3)
EOSINOPHIL # BLD AUTO: 0.17 THOUSAND/ÂΜL (ref 0–0.61)
EOSINOPHIL NFR BLD AUTO: 2 % (ref 0–6)
ERYTHROCYTE [DISTWIDTH] IN BLOOD BY AUTOMATED COUNT: 14.6 % (ref 11.6–15.1)
EST. AVERAGE GLUCOSE BLD GHB EST-MCNC: 131 MG/DL
GFR SERPL CREATININE-BSD FRML MDRD: 89 ML/MIN/1.73SQ M
GLUCOSE P FAST SERPL-MCNC: 82 MG/DL (ref 65–99)
HBA1C MFR BLD: 6.2 %
HCT VFR BLD AUTO: 41.5 % (ref 34.8–46.1)
HGB BLD-MCNC: 12.8 G/DL (ref 11.5–15.4)
IMM GRANULOCYTES # BLD AUTO: 0.03 THOUSAND/UL (ref 0–0.2)
IMM GRANULOCYTES NFR BLD AUTO: 0 % (ref 0–2)
LYMPHOCYTES # BLD AUTO: 1.87 THOUSANDS/ÂΜL (ref 0.6–4.47)
LYMPHOCYTES NFR BLD AUTO: 22 % (ref 14–44)
MCH RBC QN AUTO: 28.8 PG (ref 26.8–34.3)
MCHC RBC AUTO-ENTMCNC: 30.8 G/DL (ref 31.4–37.4)
MCV RBC AUTO: 94 FL (ref 82–98)
MONOCYTES # BLD AUTO: 0.7 THOUSAND/ÂΜL (ref 0.17–1.22)
MONOCYTES NFR BLD AUTO: 8 % (ref 4–12)
NEUTROPHILS # BLD AUTO: 5.71 THOUSANDS/ÂΜL (ref 1.85–7.62)
NEUTS SEG NFR BLD AUTO: 67 % (ref 43–75)
NRBC BLD AUTO-RTO: 0 /100 WBCS
PLATELET # BLD AUTO: 252 THOUSANDS/UL (ref 149–390)
PMV BLD AUTO: 11.3 FL (ref 8.9–12.7)
POTASSIUM SERPL-SCNC: 4.1 MMOL/L (ref 3.5–5.3)
PROT SERPL-MCNC: 7.4 G/DL (ref 6.4–8.4)
RBC # BLD AUTO: 4.44 MILLION/UL (ref 3.81–5.12)
SODIUM SERPL-SCNC: 139 MMOL/L (ref 135–147)
WBC # BLD AUTO: 8.53 THOUSAND/UL (ref 4.31–10.16)

## 2024-12-24 ENCOUNTER — RESULTS FOLLOW-UP (OUTPATIENT)
Dept: FAMILY MEDICINE CLINIC | Facility: CLINIC | Age: 69
End: 2024-12-24

## 2025-01-13 ENCOUNTER — OFFICE VISIT (OUTPATIENT)
Dept: PSYCHIATRY | Facility: CLINIC | Age: 70
End: 2025-01-13
Payer: MEDICARE

## 2025-01-13 VITALS
HEIGHT: 68 IN | SYSTOLIC BLOOD PRESSURE: 105 MMHG | HEART RATE: 61 BPM | DIASTOLIC BLOOD PRESSURE: 69 MMHG | BODY MASS INDEX: 35.16 KG/M2 | WEIGHT: 232 LBS

## 2025-01-13 DIAGNOSIS — Z79.899 LONG-TERM USE OF HIGH-RISK MEDICATION: Chronic | ICD-10-CM

## 2025-01-13 DIAGNOSIS — F31.78 BIPOLAR I DISORDER, MOST RECENT EPISODE MIXED, IN FULL REMISSION (HCC): Primary | Chronic | ICD-10-CM

## 2025-01-13 DIAGNOSIS — G25.9 EXTRAPYRAMIDAL REACTION: Chronic | ICD-10-CM

## 2025-01-13 PROCEDURE — 90833 PSYTX W PT W E/M 30 MIN: CPT | Performed by: PSYCHIATRY & NEUROLOGY

## 2025-01-13 PROCEDURE — 99214 OFFICE O/P EST MOD 30 MIN: CPT | Performed by: PSYCHIATRY & NEUROLOGY

## 2025-01-13 RX ORDER — BENZTROPINE MESYLATE 1 MG/1
1 TABLET ORAL 2 TIMES DAILY
Qty: 60 TABLET | Refills: 4 | Status: SHIPPED | OUTPATIENT
Start: 2025-01-13 | End: 2025-06-12

## 2025-01-13 RX ORDER — DIVALPROEX SODIUM 500 MG/1
500 TABLET, FILM COATED, EXTENDED RELEASE ORAL EVERY EVENING
Qty: 30 TABLET | Refills: 4 | Status: SHIPPED | OUTPATIENT
Start: 2025-01-13 | End: 2025-06-12

## 2025-01-13 RX ORDER — RISPERIDONE 1 MG/1
1 TABLET ORAL EVERY EVENING
Qty: 30 TABLET | Refills: 4 | Status: SHIPPED | OUTPATIENT
Start: 2025-01-13 | End: 2025-06-12

## 2025-01-13 NOTE — ASSESSMENT & PLAN NOTE
Follows with family physician for glucose and lipid monitoring due to current therapy with antipsychotic medication  Monitor valproic acid level, CBC/diff, CMP, and lipid profile before next visit  Monitor lipid profile and hemoglobin A1C yearly due to current therapy with antipsychotic medication - gets labs done with PCP  Orders:    Lipid panel; Future    Comprehensive metabolic panel; Future    CBC and differential; Future    Valproic acid level, total; Future

## 2025-01-13 NOTE — ASSESSMENT & PLAN NOTE
Mood remains stable    Continue Depakote  mg every evening to help with mood stabilization  Continue Risperdal 1 mg every evening to help with mood  Orders:    Lipid panel; Future    Comprehensive metabolic panel; Future    CBC and differential; Future    Valproic acid level, total; Future    divalproex sodium (DEPAKOTE ER) 500 mg 24 hr tablet; Take 1 tablet (500 mg total) by mouth every evening    risperiDONE (RisperDAL) 1 mg tablet; Take 1 tablet (1 mg total) by mouth every evening

## 2025-01-13 NOTE — BH TREATMENT PLAN
"TREATMENT PLAN (Medication Management Only)        [facility]    Name/Date of Birth/MRN:  Nitza Vitale 69 y.o. 1955 MRN: [id number]  Date of Treatment Plan: January 13, 2025  Diagnosis/Diagnoses:   1. Bipolar I disorder, most recent episode mixed, in full remission (HCC)    2. Long-term use of high-risk medication    3. Extrapyramidal reaction      Strengths/Personal Resources for Self-Care: \"taking care of myself\".  Area/Areas of need (in own words): \"getting more done\".  1. Long Term Goal:   maintain control of anxiety, maintain mood stability  Target Date: 4 months - 5/13/2025  Person/Persons responsible for completion of goal: Nitza  2.  Short Term Objective (s) - How will we reach this goal?:   A.  Provider new recommended medication/dosage changes and/or continue medication(s): continue current medications as prescribed (Depakote ER, Risperdal, and Cogentin).  B.  N/A.  C.  N/A.  Target Date: 4 months - 5/13/2025  Person/Persons Responsible for Completion of Goal: Nitza   Progress Towards Goals: stable  Treatment Modality: medication management every 4 months  Review due 180 days from date of this plan: 6 months - 7/13/2025  Expected length of service: maintenance unless revised  My Physician/PA/NP and I have developed this plan together and I agree to work on the goals and objectives. I understand the treatment goals that were developed for my treatment.  Electronic Signatures: on file (unless signed below)    Mouna Brewster MD 01/13/25  "

## 2025-01-13 NOTE — BH CRISIS PLAN
"Client Name: Nitza Vitale       Client YOB: 1955    ElíasDimitrios Safety Plan      Creation Date: 12/7/23 Update Date: 1/13/25   Created By: Mouna Brewster MD Last Updated By: Mouna Brewster MD      Step 1: Warning Signs:   Warning Signs   \"When my  blows up\"            Step 2: Internal Coping Strategies:   Internal Coping Strategies   \"Reading, taking time for myself\"            Step 3: People and social settings that provide distraction:   Name Contact Information    Danny [phone]   Son Seun [phone]            Step 4: People whom I can ask for help during a crisis:      Name Contact Information    as above people       Step 5: Professionals or agencies I can contact during a crisis:      Clinican/Agency Name Phone Emergency Contact    [facility] [phone]       [facility] Emergency Department Emergency Department Phone Emergency Department Address    [facility] [address]         Crisis Phone Numbers:   Suicide Prevention Lifeline: Call or Text  761 Crisis Text Line: Text HOME to 661-875   Please note: Some [facility] do not have a separate number for Child/Adolescent specific crisis. If your county is not listed under Child/Adolescent, please call the adult number for your county      Adult Crisis Numbers: Child/Adolescent Crisis Numbers   [facility]: [phone] [facility]: [phone]   [facility]: [phone] [facility]: [phone]   [facility]: [phone] [place]: [phone]   [facility]: [phone] [place]: [phone]   [place]: [phone]   [facility]: [phone]   [facility]: [phone]   [facility]: [phone] (daytime) [phone] (after hours, weekends, holidays)      Step 6: Making the environment safer (plan for lethal means safety):   Plan: Guns - locked by      Optional: What is most important to me and " worth living for?   I do want to love longer     Юлия Safety Plan. Laura Mckinley and Deven Plunkett. Used with permission of the authors.

## 2025-01-13 NOTE — ASSESSMENT & PLAN NOTE
Minimal hand tremor    Continue Cogentin 1 mg twice a day to help with extrapyramidal symptoms  Orders:    benztropine (COGENTIN) 1 mg tablet; Take 1 tablet (1 mg total) by mouth 2 (two) times a day

## 2025-01-14 ENCOUNTER — HOSPITAL ENCOUNTER (OUTPATIENT)
Dept: MAMMOGRAPHY | Facility: CLINIC | Age: 70
Discharge: HOME | End: 2025-01-14
Payer: MEDICARE

## 2025-01-14 ENCOUNTER — HOSPITAL ENCOUNTER (OUTPATIENT)
Dept: ULTRASOUND IMAGING | Facility: CLINIC | Age: 70
Discharge: HOME | End: 2025-01-14
Payer: MEDICARE

## 2025-01-14 ENCOUNTER — RESULTS FOLLOW-UP (OUTPATIENT)
Dept: FAMILY MEDICINE CLINIC | Facility: CLINIC | Age: 70
End: 2025-01-14

## 2025-01-14 DIAGNOSIS — R92.8 ABNORMAL MAMMOGRAM: ICD-10-CM

## 2025-01-14 DIAGNOSIS — I48.91 ATRIAL FIBRILLATION WITH RVR (HCC): ICD-10-CM

## 2025-01-14 PROCEDURE — G0279 TOMOSYNTHESIS, MAMMO: HCPCS

## 2025-01-14 PROCEDURE — 77065 DX MAMMO INCL CAD UNI: CPT

## 2025-01-14 PROCEDURE — 76642 ULTRASOUND BREAST LIMITED: CPT

## 2025-01-15 RX ORDER — METOPROLOL TARTRATE 50 MG
50 TABLET ORAL EVERY 12 HOURS
Qty: 180 TABLET | Refills: 1 | Status: SHIPPED | OUTPATIENT
Start: 2025-01-15

## 2025-01-22 NOTE — ASSESSMENT & PLAN NOTE
Wt Readings from Last 3 Encounters:   12/19/24 102 kg (225 lb)   12/04/24 102 kg (225 lb)   11/11/24 101 kg (222 lb)     ECHO (12/19/24) - EF 60%, LA size WNL, normal wall motion, moderate TR, mild MR  Has known chronic bilateral lower extremity edema (does not appear grossly volume overloaded)

## 2025-01-22 NOTE — ASSESSMENT & PLAN NOTE
Diagnosed incidentally by PCP around 11/2018  Had attempted DCCV 01/2019  Required repeat DCCV 12/2019-unclear how long she remained in NSR after each cardioversion  Ablation was discussed in the past, but planning fell through due to patient's insurance  She has since had 6 years of ongoing atrial fibrillation with plan for rate control moving forwards  Current medication regimen:  AC: Eliquis 5mg BID ([id number] 2 (age, CHF))  Rate: Metoprolol tartrate 50 mg twice daily  AAD: None (was trialed on Amio in the past, but patient had side effects)

## 2025-01-22 NOTE — PROGRESS NOTES
Electrophysiology Follow Up  [facility]  [facility] [address], [place] [address]    Name: Nitza Vitale  : 1955  MRN: [id number]    Assessment & Plan  Other persistent atrial fibrillation (HCC)  Diagnosed incidentally by PCP around 2018  Had attempted DCCV 2019  Required repeat DCCV 2019-unclear how long she remained in NSR after each cardioversion  Ablation was discussed in the past, but planning fell through due to patient's insurance  She has since had 6 years of ongoing atrial fibrillation with plan for rate control moving forwards  Current medication regimen:  AC: Eliquis 5mg BID ([id number] 2 (age, CHF))  Rate: Metoprolol tartrate 50 mg twice daily  AAD: None (was trialed on Amio in the past, but patient had side effects)  Chronic diastolic congestive heart failure (HCC)  Wt Readings from Last 3 Encounters:   24 102 kg (225 lb)   24 102 kg (225 lb)   24 101 kg (222 lb)     ECHO (24) - EF 60%, LA size WNL, normal wall motion, moderate TR, mild MR  Has known chronic bilateral lower extremity edema (does not appear grossly volume overloaded)  DOMENIC on CPAP  Recommend CPAP compliance  Obesity, morbid (HCC)  Recommend weight loss via healthy diet and exercise  Bipolar I disorder, most recent episode mixed, in full remission (HCC)  Maintained on risperidone and divalproex  Continue outpatient behavioral health follow-up       Discussion/Plan:    Patient has a history of A-fib for which rate control only is being pursued and she is currently maintained on Eliquis and metoprolol as above    EKG in office today showing atrial fibrillation w/ ventricular rate 73 bpm    Since her last outpatient EP appointment she reports she has been feeling well and currently denies acute cardiac complaint    She denies any significant adverse bleeding events on Eliquis     No acute medication changes made today    Patient has been  instructed to follow up in our EP office in 1 year or as needed. She will call our office with any questions or concerns in the meantime.    Rhythm History:   Atrial fibrillation:      Atrial flutter:      SVT:      VT/VF/PVC:     Device history:   Pacemaker:     Defibrillator:     BIV PPM:     BIV ICD:     ILR:    Interim History/HPI:   Interim history: Nitza Vitale is a 69 y.o. female with a PMH of A-fib, CHF, DOMENIC, obesity, and bipolar.        She presents today for routine outpatient follow-up given her history of A-fib.  Since her last outpatient EP appointment she reports she has been feeling well and currently denies acute cardiac complaint.  She denies any significant adverse bleeding event whilst on Eliquis    EKG:  atrial fibrillation w/ ventricular rate 73 bpm    Review of Systems   Constitutional:  Negative for appetite change, chills, fatigue, fever and unexpected weight change.   Respiratory:  Negative for chest tightness and shortness of breath.    Cardiovascular:  Negative for chest pain, palpitations and leg swelling.   Neurological:  Negative for dizziness, syncope, weakness and light-headedness.         OBJECTIVE:   Vitals:   There were no vitals taken for this visit.  There is no height or weight on file to calculate BMI.        Physical Exam:   Physical Exam  Constitutional:       General: She is not in acute distress.     Appearance: Normal appearance. She is obese. She is not ill-appearing.   HENT:      Head: Normocephalic and atraumatic.      Nose: Nose normal.   Eyes:      General:         Right eye: No discharge.         Left eye: No discharge.   Neck:      Vascular: No carotid bruit.   Cardiovascular:      Rate and Rhythm: Normal rate. Rhythm irregular.      Pulses: Normal pulses.      Heart sounds: Normal heart sounds.   Pulmonary:      Effort: Pulmonary effort is normal.      Breath sounds: Normal breath sounds.   Musculoskeletal:      Right lower leg: No edema.      Left lower leg: No  edema.   Skin:     General: Skin is warm and dry.      Capillary Refill: Capillary refill takes less than 2 seconds.   Neurological:      Mental Status: She is alert.            Medications:      Current Outpatient Medications:     acetaminophen (TYLENOL) 325 mg tablet, Take 650 mg by mouth if needed for mild pain, Disp: , Rfl:     apixaban (Eliquis) 5 mg, Take 1 tablet (5 mg total) by mouth 2 (two) times a day, Disp: 180 tablet, Rfl: 3    ascorbic acid (VITAMIN C) 500 mg tablet, Take 500 mg by mouth daily, Disp: , Rfl:     benztropine (COGENTIN) 1 mg tablet, Take 1 tablet (1 mg total) by mouth 2 (two) times a day, Disp: 60 tablet, Rfl: 4    divalproex sodium (DEPAKOTE ER) 500 mg 24 hr tablet, Take 1 tablet (500 mg total) by mouth every evening, Disp: 30 tablet, Rfl: 4    furosemide (LASIX) 20 mg tablet, Take 1 tablet (20 mg total) by mouth 2 (two) times a day, Disp: 180 tablet, Rfl: 3    metoprolol tartrate (LOPRESSOR) 50 mg tablet, take 1 tablet by mouth every 12 hours, Disp: 180 tablet, Rfl: 1    Multiple Vitamin (MULTI-VITAMIN DAILY) TABS, Take 1 tablet by mouth daily, Disp: , Rfl:     potassium chloride (MICRO-K) 10 MEQ CR capsule, Take 1 capsule (10 mEq total) by mouth 2 (two) times a day, Disp: 180 capsule, Rfl: 3    risperiDONE (RisperDAL) 1 mg tablet, Take 1 tablet (1 mg total) by mouth every evening, Disp: 30 tablet, Rfl: 4       Historical Information   Past Medical History:   Diagnosis Date    Atrial fibrillation (HCC) 2019    Bipolar disorder (HCC)     bipolar    Cataracts, bilateral     CHF (congestive heart failure) (HCC)     Leg edema     Prediabetes     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    Sleep apnea     Varicose veins of legs        Past Surgical History:   Procedure Laterality Date    CARDIOVERSION       SECTION         Social History     Substance and Sexual Activity   Alcohol Use No     Social History     Substance and Sexual Activity   Drug Use No     Social History     Tobacco Use    Smoking Status Former    Current packs/day: 0.00    Average packs/day: 1 pack/day for 20.0 years (20.0 ttl pk-yrs)    Types: Cigarettes    Start date:     Quit date:     Years since quittin.0    Passive exposure: Past   Smokeless Tobacco Never       Family History   Problem Relation Age of Onset    Stroke Mother         CVA    Heart failure Mother     Diabetes Mother     Hypertension Mother     Thyroid disease Mother     Gallbladder disease Mother         gallstones    Diabetes Maternal Grandmother     No Known Problems Maternal Grandfather     No Known Problems Paternal Grandmother     No Known Problems Paternal Grandfather     Psychiatric Illness Maternal Aunt     Breast cancer Maternal Aunt     No Known Problems Maternal Aunt     No Known Problems Maternal Aunt     No Known Problems Brother     No Known Problems Brother     No Known Problems Brother     No Known Problems Brother     No Known Problems Brother     No Known Problems Brother     No Known Problems Brother     No Known Problems Brother     Breast cancer Cousin     Cancer Cousin     Substance Abuse Neg Hx     Suicide Attempts Neg Hx          Labs & Results:  Below is the patient's most recent value for Albumin, ALT, AST, BUN, Calcium, Chloride, Cholesterol, CO2, Creatinine, GFR, Glucose, HDL, Hematocrit, Hemoglobin, Hemoglobin A1C, LDL, Magnesium, Phosphorus, Platelets, Potassium, PSA, Sodium, Triglycerides, and WBC.   Lab Results   Component Value Date    ALT 15 2024    AST 23 2024    BUN 19 2024    CALCIUM 9.6 2024    CL 98 2024    CO2 34 (H) 2024    CREATININE 0.70 2024    HDL 46 (L) 2024    HCT 41.5 2024    HGB 12.8 2024    HGBA1C 6.2 (H) 2024    MG 2.0 2019     2024    K 4.1 2024     (L) 2016    TRIG 105 2024    WBC 8.53 2024     Note: for a comprehensive list of the patient's lab results, access the Results Review  activity.

## 2025-01-24 ENCOUNTER — OFFICE VISIT (OUTPATIENT)
Dept: CARDIOLOGY CLINIC | Facility: MEDICAL CENTER | Age: 70
End: 2025-01-24
Payer: MEDICARE

## 2025-01-24 VITALS
HEIGHT: 66 IN | HEART RATE: 83 BPM | SYSTOLIC BLOOD PRESSURE: 116 MMHG | BODY MASS INDEX: 36.96 KG/M2 | DIASTOLIC BLOOD PRESSURE: 70 MMHG | OXYGEN SATURATION: 97 % | WEIGHT: 230 LBS

## 2025-01-24 DIAGNOSIS — R60.0 EDEMA OF BOTH LOWER LEGS: ICD-10-CM

## 2025-01-24 DIAGNOSIS — I50.32 CHRONIC DIASTOLIC CONGESTIVE HEART FAILURE (HCC): Primary | ICD-10-CM

## 2025-01-24 DIAGNOSIS — I48.19 OTHER PERSISTENT ATRIAL FIBRILLATION (HCC): ICD-10-CM

## 2025-01-24 PROCEDURE — 99214 OFFICE O/P EST MOD 30 MIN: CPT | Performed by: INTERNAL MEDICINE

## 2025-01-24 NOTE — LETTER
January 24, 2025     Lisha Kamara PA-C  [address] [facility]  [address]  [facility] [address]    Patient: Nitza Vitale   YOB: 1955   Date of Visit: 1/24/2025       Dear Dr. Kamara:    Thank you for referring Nitza Vitale to me for evaluation. Below are my notes for this consultation.    If you have questions, please do not hesitate to call me. I look forward to following your patient along with you.         Sincerely,        Kathy Frank MD        CC: MD Kathy Hayden MD  1/24/2025  2:27 PM  Sign when Signing Visit                                             Cardiology Follow Up    Nitza Vitale  1955  [id number]  [facility] [place] [address] [facility]  [address]  [facility] [address]  [phone]    No diagnosis found.    There are no diagnoses linked to this encounter.  I had the pleasure of seeing Nitza Vitale for a follow up visit.     INTERVAL HISTORY: 1st visit    History of the presenting illness, Discussion/Summary and My Plan are as follows:::    Nitza is a 69-year-old lady with a history of persistent atrial fibrillation originally diagnosed in 2018, has undergone cardioversions and sees Dr. Lewis.  Since she has had longstanding atrial fibrillation, rate control has been pursued.  She has remained on apixaban for anticoagulation.    She has impaired fasting glucose but no hypertension or diabetes or dyslipidemia, less than a 10-pack-year smoking history, walks her dog 4 times a day and takes her stairs at home, with no limitations or symptoms recently.    She has also had chronic lower extremity edema for more than 2 years, likely longer based on her stasis changes for which she is on furosemide 20 mg twice daily, notices that her swelling is more at the end of the day or when she keeps her feet down or with increased salt intake in her diet.    Last echocardiogram was overall unremarkable with moderate TR, preserved  EF, PASP 36, IVC was not well-visualized.    Plan:    Persistent atrial fibrillation: Followed by Dr. Lewis, following rate control strategy, currently in rate controlled atrial fibrillation, on apixaban for anticoagulation without any bleeds or falls with stable renal function and hemoglobin    Chronic bilateral lower extremity edema: Likely from venous insufficiency, continue furosemide 20 mg twice daily but has not noticed much difference with that, will check lower extremity Dopplers to rule out venous insufficiency as well as DVT although unlikely she would have DVT while being compliant with apixaban.    Obstructive sleep apnea: Has been compliant with CPAP.    Rate controlled atrial fibrillation and normal QT interval on ECG    Follow-up in 6 months, she lives in [place] and plans to follow here.         Latest Reference Range & Units 11/16/22 11:44 05/16/23 13:27 11/30/23 12:27 07/31/24 14:38   Cholesterol See Comment mg/dL 151 174 176 168   Triglycerides See Comment mg/dL 88 80 86 105   HDL >=50 mg/dL 47 (L) 55 48 (L) 46 (L)   Non-HDL Cholesterol mg/dl 104 119 128 122   LDL Calculated 0 - 100 mg/dL 86 103 (H) 111 (H) 101 (H)   (L): Data is abnormally low  (H): Data is abnormally high     Latest Reference Range & Units 07/31/24 14:38 12/23/24 13:30   BUN 5 - 25 mg/dL 18 19   Creatinine 0.60 - 1.30 mg/dL 0.64 0.70      Latest Reference Range & Units 07/31/24 14:38 12/23/24 13:30   Hemoglobin 11.5 - 15.4 g/dL 12.6 12.8   Hematocrit 34.8 - 46.1 % 39.2 41.5     Patient Active Problem List   Diagnosis   • Bipolar I disorder, most recent episode mixed, in full remission (HCC)   • Extrapyramidal reaction   • Long-term use of high-risk medication   • Hyponatremia   • Other persistent atrial fibrillation (HCC)   • DOMENIC on CPAP   • Prediabetes   • Edema of both lower legs   • Chronic diastolic congestive heart failure (HCC)   • Obesity, morbid (HCC)     Past Medical History:   Diagnosis Date   • Atrial fibrillation  ([facility]) 2019   • Bipolar disorder ([facility])     bipolar   • Cataracts, bilateral    • CHF (congestive heart failure) ([facility])    • Leg edema    • Prediabetes    • 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   • Sleep apnea    • Varicose veins of legs      Social History     Socioeconomic History   • Marital status: /Civil Union     Spouse name: Not on file   • Number of children: 1   • Years of education: 12   • Highest education level: 12th grade   Occupational History   • Occupation: unemployed   Tobacco Use   • Smoking status: Former     Current packs/day: 0.00     Average packs/day: 1 pack/day for 20.0 years (20.0 ttl pk-yrs)     Types: Cigarettes     Start date:      Quit date:      Years since quittin.0     Passive exposure: Past   • Smokeless tobacco: Never   Vaping Use   • Vaping status: Never Used   Substance and Sexual Activity   • Alcohol use: No   • Drug use: No   • Sexual activity: Yes     Partners: Male   Other Topics Concern   • Not on file   Social History Narrative    Education: high school graduate    Learning Disabilities: none    Marital History:     Children: 1 adult son    Living Arrangement: lives in home with     Occupational History: worked in [employer] in the past, unemployed    Functioning Relationships: good support system,  is supportive    Legal History: none     History: None    Always uses seat belt      Social Drivers of Health     Financial Resource Strain: Medium Risk (2025)    Overall Financial Resource Strain (CARDIA)    • Difficulty of Paying Living Expenses: Somewhat hard   Food Insecurity: No Food Insecurity (2025)    Hunger Vital Sign    • Worried About Running Out of Food in the Last Year: Never true    • Ran Out of Food in the Last Year: Never true   Transportation Needs: No Transportation Needs (2025)    PRAPARE - Transportation    • Lack of Transportation (Medical): No    • Lack of Transportation (Non-Medical): No   Physical  Activity: Sufficiently Active (1/13/2025)    Exercise Vital Sign    • Days of Exercise per Week: 7 days    • Minutes of Exercise per Session: 60 min   Stress: Stress Concern Present (1/13/2025)    [place] citizen [place] of Occupational Health - Occupational Stress Questionnaire    • Feeling of Stress : To some extent   Social Connections: Moderately Isolated (1/13/2025)    Social Connection and Isolation Panel [NHANES]    • Frequency of Communication with Friends and Family: Twice a week    • Frequency of Social Gatherings with Friends and Family: Twice a week    • Attends Tenriism Services: Never    • Active Member of Clubs or Organizations: No    • Attends Club or Organization Meetings: Never    • Marital Status:    Intimate Partner Violence: Not At Risk (1/13/2025)    Humiliation, Afraid, Rape, and Kick questionnaire    • Fear of Current or Ex-Partner: No    • Emotionally Abused: No    • Physically Abused: No    • Sexually Abused: No   Housing Stability: Low Risk  (1/13/2025)    Housing Stability Vital Sign    • Unable to Pay for Housing in the Last Year: No    • Number of Times Moved in the Last Year: 0    • Homeless in the Last Year: No      Family History   Problem Relation Age of Onset   • Stroke Mother         CVA   • Heart failure Mother    • Diabetes Mother    • Hypertension Mother    • Thyroid disease Mother    • Gallbladder disease Mother         gallstones   • Diabetes Maternal Grandmother    • No Known Problems Maternal Grandfather    • No Known Problems Paternal Grandmother    • No Known Problems Paternal Grandfather    • Psychiatric Illness Maternal Aunt    • Breast cancer Maternal Aunt    • No Known Problems Maternal Aunt    • No Known Problems Maternal Aunt    • No Known Problems Brother    • No Known Problems Brother    • No Known Problems Brother    • No Known Problems Brother    • No Known Problems Brother    • No Known Problems Brother    • No Known Problems Brother    • No Known Problems  "Brother    • Breast cancer Cousin    • Cancer Cousin    • Substance Abuse Neg Hx    • Suicide Attempts Neg Hx      Past Surgical History:   Procedure Laterality Date   • CARDIOVERSION     •  SECTION         Current Outpatient Medications:   •  acetaminophen (TYLENOL) 325 mg tablet, Take 650 mg by mouth if needed for mild pain, Disp: , Rfl:   •  apixaban (Eliquis) 5 mg, Take 1 tablet (5 mg total) by mouth 2 (two) times a day, Disp: 180 tablet, Rfl: 3  •  ascorbic acid (VITAMIN C) 500 mg tablet, Take 500 mg by mouth daily, Disp: , Rfl:   •  benztropine (COGENTIN) 1 mg tablet, Take 1 tablet (1 mg total) by mouth 2 (two) times a day, Disp: 60 tablet, Rfl: 4  •  divalproex sodium (DEPAKOTE ER) 500 mg 24 hr tablet, Take 1 tablet (500 mg total) by mouth every evening, Disp: 30 tablet, Rfl: 4  •  furosemide (LASIX) 20 mg tablet, Take 1 tablet (20 mg total) by mouth 2 (two) times a day, Disp: 180 tablet, Rfl: 3  •  metoprolol tartrate (LOPRESSOR) 50 mg tablet, take 1 tablet by mouth every 12 hours, Disp: 180 tablet, Rfl: 1  •  Multiple Vitamin (MULTI-VITAMIN DAILY) TABS, Take 1 tablet by mouth daily, Disp: , Rfl:   •  potassium chloride (MICRO-K) 10 MEQ CR capsule, Take 1 capsule (10 mEq total) by mouth 2 (two) times a day, Disp: 180 capsule, Rfl: 3  •  risperiDONE (RisperDAL) 1 mg tablet, Take 1 tablet (1 mg total) by mouth every evening, Disp: 30 tablet, Rfl: 4  Allergies   Allergen Reactions   • Levaquin [Levofloxacin]      \"heavy legs\"   • Ampicillin Rash     Category: Allergy;    • Clindamycin Rash     Category: Allergy;        Imaging: Mammo diagnostic right w 3d and cad  Mammo diagnostic right w 3d and cad, US breast right limited (diagnostic)  Result Date: 2025  Narrative: DIAGNOSIS: Abnormal mammogram TECHNIQUE: Digital diagnostic mammography was performed. Computer Aided Detection (CAD) analyzed all applicable images. Right breast ultrasound was performed. COMPARISONS: Prior breast imaging dated: " "11/11/2024, 06/13/2022, 09/13/2021, 03/10/2021, and 03/10/2021 RELEVANT HISTORY: Family Breast Cancer History: History of breast cancer in Maternal Aunt, Cousin. Family Medical History: Family medical history includes breast cancer in 2 relatives (cousin, maternal aunt). Personal History: No known relevant hormone history. No known relevant surgical history. No known relevant medical history. RISK ASSESSMENT: 5 Year Tyrer-Cuzick: 4.33% 10 Year Tyrer-Cuzick: 8.66% Lifetime Tyrer-Cuzick: 15.28% TISSUE DENSITY: The breasts are heterogeneously dense, which may obscure small masses. INDICATION: Nitza Vitale is a 69 y.o. female presenting for abnormal mammogram. FINDINGS: RIGHT 1) ASYMMETRY [B] Mammo diagnostic right w 3d and cad: Previously noted asymmetry in the upper right breast, middle third is not reproduced on current exam and represented summation artifact.  No suspicious mass, architectural distortion, or grouped calcifications are noted. Right breast ultrasound: Targeted breast ultrasound is performed using dedicated high-resolution probe.  No suspicious sonographic findings identified in the area of mammographic concern.     Impression: No evidence of malignancy. ASSESSMENT/BI-RADS CATEGORY: Right: 1 - Negative Overall: 1 - Negative RECOMMENDATION:      - Return to routine screening for the right breast. Workstation ID: [id number] Signed by:  Balbir Dhillon MD       Review of Systems:  Review of Systems   Constitutional: Negative.    HENT: Negative.     Respiratory: Negative.     Cardiovascular: Negative.    Endocrine: Negative.    Musculoskeletal: Negative.        Physical Exam:  /70 (BP Location: Left arm, Patient Position: Sitting, Cuff Size: Adult)   Pulse 83   Ht 5' 6\" (1.676 m)   Wt 104 kg (230 lb)   SpO2 97%   BMI 37.12 kg/m²   Physical Exam  Constitutional:       General: She is not in acute distress.     Appearance: She is not ill-appearing, toxic-appearing or diaphoretic.   HENT: " "     Nose: Nose normal. No congestion or rhinorrhea.   Neck:      Vascular: No carotid bruit.   Cardiovascular:      Rate and Rhythm: Normal rate and regular rhythm.      Heart sounds: No murmur heard.     No friction rub. No gallop.   Pulmonary:      Effort: Pulmonary effort is normal. No respiratory distress.      Breath sounds: No stridor. No wheezing or rhonchi.   Musculoskeletal:         General: Swelling (bilateral lower ext edema with stasis changes) present. No tenderness. Normal range of motion.      Cervical back: Normal range of motion. No rigidity or tenderness.   Lymphadenopathy:      Cervical: No cervical adenopathy.   Neurological:      Mental Status: She is alert.       This note was completed in part utilizing [employer] direct voice recognition software.   Grammatical errors, random word insertion, spelling mistakes, occasional wrong word or \"sound-alike\" substitutions and incomplete sentences may be an occasional consequence of the system secondary to software limitations, ambient noise and hardware issues. At the time of dictation, efforts were made to edit, clarify and /or correct errors.  Please read the chart carefully and recognize, using context, where substitutions have occurred.  If you have any questions or concerns about the context, text or information contained within the body of this dictation, please contact myself, the provider, for further clarification.  "

## 2025-01-24 NOTE — LETTER
January 24, 2025     Catrachito Lewis MD  [address] [facility] [address]    Patient: Nitza Vitale   YOB: 1955   Date of Visit: 1/24/2025       Dear Dr. Lewis:    Thank you for referring Nitza Vitale to me for evaluation. Below are my notes for this consultation.    If you have questions, please do not hesitate to call me. I look forward to following your patient along with you.         Sincerely,        Kathy Frank MD        CC: No Recipients    Kathy Frank MD  1/24/2025  2:25 PM  Incomplete                                             Cardiology Follow Up    Nitza Vitale  1955  [id number]  [facility] [place] [address] [facility] [address]  [address]  [facility] [id number] [phone]    No diagnosis found.    There are no diagnoses linked to this encounter.  I had the pleasure of seeing Nitza Vitale for a follow up visit.     INTERVAL HISTORY: 1st visit    History of the presenting illness, Discussion/Summary and My Plan are as follows:::    Nitza is a 69-year-old lady with a history of persistent atrial fibrillation originally diagnosed in 2018, has undergone cardioversions and sees Dr. Lewis.  Since she has had longstanding atrial fibrillation, rate control has been pursued.  She has remained on apixaban for anticoagulation.    She has impaired fasting glucose but no hypertension or diabetes or dyslipidemia, less than a 10-pack-year smoking history, walks her dog 4 times a day and takes her stairs at home, with no limitations or symptoms recently.    She has also had chronic lower extremity edema for more than 2 years, likely longer based on her stasis changes for which she is on furosemide 20 mg twice daily, notices that her swelling is more at the end of the day or when she keeps her feet down or with increased salt intake in her diet.    Last echocardiogram was overall unremarkable with moderate TR, preserved EF, PASP 36, IVC was not  well-visualized.    Plan:    Persistent atrial fibrillation: Followed by Dr. Lewis, following rate control strategy, currently in rate controlled atrial fibrillation, on apixaban for anticoagulation without any bleeds or falls with stable renal function and hemoglobin    Chronic bilateral lower extremity edema: Likely from venous insufficiency, continue furosemide 20 mg twice daily but has not noticed much difference with that, will check lower extremity Dopplers to rule out venous insufficiency as well as DVT although unlikely she would have DVT while being compliant with apixaban.    Obstructive sleep apnea: Has been compliant with CPAP.    Rate controlled atrial fibrillation and normal QT interval on ECG    Follow-up in 6 months, she lives in [place] and plans to follow here.         Latest Reference Range & Units 11/16/22 11:44 05/16/23 13:27 11/30/23 12:27 07/31/24 14:38   Cholesterol See Comment mg/dL 151 174 176 168   Triglycerides See Comment mg/dL 88 80 86 105   HDL >=50 mg/dL 47 (L) 55 48 (L) 46 (L)   Non-HDL Cholesterol mg/dl 104 119 128 122   LDL Calculated 0 - 100 mg/dL 86 103 (H) 111 (H) 101 (H)   (L): Data is abnormally low  (H): Data is abnormally high     Latest Reference Range & Units 07/31/24 14:38 12/23/24 13:30   BUN 5 - 25 mg/dL 18 19   Creatinine 0.60 - 1.30 mg/dL 0.64 0.70      Latest Reference Range & Units 07/31/24 14:38 12/23/24 13:30   Hemoglobin 11.5 - 15.4 g/dL 12.6 12.8   Hematocrit 34.8 - 46.1 % 39.2 41.5     Patient Active Problem List   Diagnosis   • Bipolar I disorder, most recent episode mixed, in full remission (HCC)   • Extrapyramidal reaction   • Long-term use of high-risk medication   • Hyponatremia   • Other persistent atrial fibrillation (HCC)   • DOMENIC on CPAP   • Prediabetes   • Edema of both lower legs   • Chronic diastolic congestive heart failure (HCC)   • Obesity, morbid (HCC)     Past Medical History:   Diagnosis Date   • Atrial fibrillation (HCC) 2019   • Bipolar  disorder (HCC)     bipolar   • Cataracts, bilateral    • CHF (congestive heart failure) ([facility])    • Leg edema    • Prediabetes    • 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   • Sleep apnea    • Varicose veins of legs      Social History     Socioeconomic History   • Marital status: /Civil Union     Spouse name: Not on file   • Number of children: 1   • Years of education: 12   • Highest education level: 12th grade   Occupational History   • Occupation: unemployed   Tobacco Use   • Smoking status: Former     Current packs/day: 0.00     Average packs/day: 1 pack/day for 20.0 years (20.0 ttl pk-yrs)     Types: Cigarettes     Start date:      Quit date:      Years since quittin.0     Passive exposure: Past   • Smokeless tobacco: Never   Vaping Use   • Vaping status: Never Used   Substance and Sexual Activity   • Alcohol use: No   • Drug use: No   • Sexual activity: Yes     Partners: Male   Other Topics Concern   • Not on file   Social History Narrative    Education: high school graduate    Learning Disabilities: none    Marital History:     Children: 1 adult son    Living Arrangement: lives in home with     Occupational History: worked in [employer] in the past, unemployed    Functioning Relationships: good support system,  is supportive    Legal History: none     History: None    Always uses seat belt      Social Drivers of Health     Financial Resource Strain: Medium Risk (2025)    Overall Financial Resource Strain (CARDIA)    • Difficulty of Paying Living Expenses: Somewhat hard   Food Insecurity: No Food Insecurity (2025)    Hunger Vital Sign    • Worried About Running Out of Food in the Last Year: Never true    • Ran Out of Food in the Last Year: Never true   Transportation Needs: No Transportation Needs (2025)    PRAPARE - Transportation    • Lack of Transportation (Medical): No    • Lack of Transportation (Non-Medical): No   Physical Activity: Sufficiently  Active (1/13/2025)    Exercise Vital Sign    • Days of Exercise per Week: 7 days    • Minutes of Exercise per Session: 60 min   Stress: Stress Concern Present (1/13/2025)    [place] [employer] - Occupational Stress Questionnaire    • Feeling of Stress : To some extent   Social Connections: Moderately Isolated (1/13/2025)    Social Connection and Isolation Panel [NHANES]    • Frequency of Communication with Friends and Family: Twice a week    • Frequency of Social Gatherings with Friends and Family: Twice a week    • Attends Scientologist Services: Never    • Active Member of Clubs or Organizations: No    • Attends Club or Organization Meetings: Never    • Marital Status:    Intimate Partner Violence: Not At Risk (1/13/2025)    Humiliation, Afraid, Rape, and Kick questionnaire    • Fear of Current or Ex-Partner: No    • Emotionally Abused: No    • Physically Abused: No    • Sexually Abused: No   Housing Stability: Low Risk  (1/13/2025)    Housing Stability Vital Sign    • Unable to Pay for Housing in the Last Year: No    • Number of Times Moved in the Last Year: 0    • Homeless in the Last Year: No      Family History   Problem Relation Age of Onset   • Stroke Mother         CVA   • Heart failure Mother    • Diabetes Mother    • Hypertension Mother    • Thyroid disease Mother    • Gallbladder disease Mother         gallstones   • Diabetes Maternal Grandmother    • No Known Problems Maternal Grandfather    • No Known Problems Paternal Grandmother    • No Known Problems Paternal Grandfather    • Psychiatric Illness Maternal Aunt    • Breast cancer Maternal Aunt    • No Known Problems Maternal Aunt    • No Known Problems Maternal Aunt    • No Known Problems Brother    • No Known Problems Brother    • No Known Problems Brother    • No Known Problems Brother    • No Known Problems Brother    • No Known Problems Brother    • No Known Problems Brother    • No Known Problems Brother    • Breast  "cancer Cousin    • Cancer Cousin    • Substance Abuse Neg Hx    • Suicide Attempts Neg Hx      Past Surgical History:   Procedure Laterality Date   • CARDIOVERSION     •  SECTION         Current Outpatient Medications:   •  acetaminophen (TYLENOL) 325 mg tablet, Take 650 mg by mouth if needed for mild pain, Disp: , Rfl:   •  apixaban (Eliquis) 5 mg, Take 1 tablet (5 mg total) by mouth 2 (two) times a day, Disp: 180 tablet, Rfl: 3  •  ascorbic acid (VITAMIN C) 500 mg tablet, Take 500 mg by mouth daily, Disp: , Rfl:   •  benztropine (COGENTIN) 1 mg tablet, Take 1 tablet (1 mg total) by mouth 2 (two) times a day, Disp: 60 tablet, Rfl: 4  •  divalproex sodium (DEPAKOTE ER) 500 mg 24 hr tablet, Take 1 tablet (500 mg total) by mouth every evening, Disp: 30 tablet, Rfl: 4  •  furosemide (LASIX) 20 mg tablet, Take 1 tablet (20 mg total) by mouth 2 (two) times a day, Disp: 180 tablet, Rfl: 3  •  metoprolol tartrate (LOPRESSOR) 50 mg tablet, take 1 tablet by mouth every 12 hours, Disp: 180 tablet, Rfl: 1  •  Multiple Vitamin (MULTI-VITAMIN DAILY) TABS, Take 1 tablet by mouth daily, Disp: , Rfl:   •  potassium chloride (MICRO-K) 10 MEQ CR capsule, Take 1 capsule (10 mEq total) by mouth 2 (two) times a day, Disp: 180 capsule, Rfl: 3  •  risperiDONE (RisperDAL) 1 mg tablet, Take 1 tablet (1 mg total) by mouth every evening, Disp: 30 tablet, Rfl: 4  Allergies   Allergen Reactions   • Levaquin [Levofloxacin]      \"heavy legs\"   • Ampicillin Rash     Category: Allergy;    • Clindamycin Rash     Category: Allergy;        Imaging: Mammo diagnostic right w 3d and cad  Mammo diagnostic right w 3d and cad, US breast right limited (diagnostic)  Result Date: 2025  Narrative: DIAGNOSIS: Abnormal mammogram TECHNIQUE: Digital diagnostic mammography was performed. Computer Aided Detection (CAD) analyzed all applicable images. Right breast ultrasound was performed. COMPARISONS: Prior breast imaging dated: 2024, 2022, " "09/13/2021, 03/10/2021, and 03/10/2021 RELEVANT HISTORY: Family Breast Cancer History: History of breast cancer in Maternal Aunt, Cousin. Family Medical History: Family medical history includes breast cancer in 2 relatives (cousin, maternal aunt). Personal History: No known relevant hormone history. No known relevant surgical history. No known relevant medical history. RISK ASSESSMENT: 5 Year Tyrer-Cuzick: 4.33% 10 Year Tyrer-Cuzick: 8.66% Lifetime Tyrer-Cuzick: 15.28% TISSUE DENSITY: The breasts are heterogeneously dense, which may obscure small masses. INDICATION: Nitza Vitale is a 69 y.o. female presenting for abnormal mammogram. FINDINGS: RIGHT 1) ASYMMETRY [B] Mammo diagnostic right w 3d and cad: Previously noted asymmetry in the upper right breast, middle third is not reproduced on current exam and represented summation artifact.  No suspicious mass, architectural distortion, or grouped calcifications are noted. Right breast ultrasound: Targeted breast ultrasound is performed using dedicated high-resolution probe.  No suspicious sonographic findings identified in the area of mammographic concern.     Impression: No evidence of malignancy. ASSESSMENT/BI-RADS CATEGORY: Right: 1 - Negative Overall: 1 - Negative RECOMMENDATION:      - Return to routine screening for the right breast. Workstation ID: [id number] Signed by:  Balbir Dhillon MD       Review of Systems:  Review of Systems   Constitutional: Negative.    HENT: Negative.     Respiratory: Negative.     Cardiovascular: Negative.    Endocrine: Negative.    Musculoskeletal: Negative.        Physical Exam:  /70 (BP Location: Left arm, Patient Position: Sitting, Cuff Size: Adult)   Pulse 83   Ht 5' 6\" (1.676 m)   Wt 104 kg (230 lb)   SpO2 97%   BMI 37.12 kg/m²   Physical Exam  Constitutional:       General: She is not in acute distress.     Appearance: She is not ill-appearing, toxic-appearing or diaphoretic.   HENT:      Nose: Nose normal. " "No congestion or rhinorrhea.   Neck:      Vascular: No carotid bruit.   Cardiovascular:      Rate and Rhythm: Normal rate and regular rhythm.      Heart sounds: No murmur heard.     No friction rub. No gallop.   Pulmonary:      Effort: Pulmonary effort is normal. No respiratory distress.      Breath sounds: No stridor. No wheezing or rhonchi.   Musculoskeletal:         General: Swelling (bilateral lower ext edema with stasis changes) present. No tenderness. Normal range of motion.      Cervical back: Normal range of motion. No rigidity or tenderness.   Lymphadenopathy:      Cervical: No cervical adenopathy.   Neurological:      Mental Status: She is alert.       This note was completed in part utilizing [employer] direct voice recognition software.   Grammatical errors, random word insertion, spelling mistakes, occasional wrong word or \"sound-alike\" substitutions and incomplete sentences may be an occasional consequence of the system secondary to software limitations, ambient noise and hardware issues. At the time of dictation, efforts were made to edit, clarify and /or correct errors.  Please read the chart carefully and recognize, using context, where substitutions have occurred.  If you have any questions or concerns about the context, text or information contained within the body of this dictation, please contact myself, the provider, for further clarification.    Kathy Frank MD  1/24/2025  2:20 PM  Sign when Signing Visit                                             Cardiology Follow Up    Nitza BRADSHAW Winifred  1955  [id number]  [facility] [address]  [facility] [id number]  [phone]  [phone]    No diagnosis found.    There are no diagnoses linked to this encounter.  I had the pleasure of seeing Nitzaangélica Vitale for a follow up visit.     INTERVAL HISTORY: 1st visit    History of the presenting illness, Discussion/Summary and My Plan are as " follows:::    ***         Latest Reference Range & Units 22 11:44 23 13:27 23 12:27 24 14:38   Cholesterol See Comment mg/dL 151 174 176 168   Triglycerides See Comment mg/dL 88 80 86 105   HDL >=50 mg/dL 47 (L) 55 48 (L) 46 (L)   Non-HDL Cholesterol mg/dl 104 119 128 122   LDL Calculated 0 - 100 mg/dL 86 103 (H) 111 (H) 101 (H)   (L): Data is abnormally low  (H): Data is abnormally high     Latest Reference Range & Units 24 14:38 24 13:30   BUN 5 - 25 mg/dL 18 19   Creatinine 0.60 - 1.30 mg/dL 0.64 0.70      Latest Reference Range & Units 24 14:38 24 13:30   Hemoglobin 11.5 - 15.4 g/dL 12.6 12.8   Hematocrit 34.8 - 46.1 % 39.2 41.5     Patient Active Problem List   Diagnosis   • Bipolar I disorder, most recent episode mixed, in full remission (HCC)   • Extrapyramidal reaction   • Long-term use of high-risk medication   • Hyponatremia   • Other persistent atrial fibrillation (HCC)   • DOMENIC on CPAP   • Prediabetes   • Edema of both lower legs   • Chronic diastolic congestive heart failure (HCC)   • Obesity, morbid (HCC)     Past Medical History:   Diagnosis Date   • Atrial fibrillation (HCC)    • Bipolar disorder (HCC)     bipolar   • Cataracts, bilateral    • CHF (congestive heart failure) (HCC)    • Leg edema    • Prediabetes    • 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   • Sleep apnea    • Varicose veins of legs      Social History     Socioeconomic History   • Marital status: /Civil Union     Spouse name: Not on file   • Number of children: 1   • Years of education: 12   • Highest education level: 12th grade   Occupational History   • Occupation: unemployed   Tobacco Use   • Smoking status: Former     Current packs/day: 0.00     Average packs/day: 1 pack/day for 20.0 years (20.0 ttl pk-yrs)     Types: Cigarettes     Start date:      Quit date:      Years since quittin.0     Passive exposure: Past   • Smokeless tobacco: Never   Vaping Use   •  Vaping status: Never Used   Substance and Sexual Activity   • Alcohol use: No   • Drug use: No   • Sexual activity: Yes     Partners: Male   Other Topics Concern   • Not on file   Social History Narrative    Education: high school graduate    Learning Disabilities: none    Marital History:     Children: 1 adult son    Living Arrangement: lives in home with     Occupational History: worked in [employer] in the past, unemployed    Functioning Relationships: good support system,  is supportive    Legal History: none     History: None    Always uses seat belt      Social Drivers of Health     Financial Resource Strain: Medium Risk (1/13/2025)    Overall Financial Resource Strain (CARDIA)    • Difficulty of Paying Living Expenses: Somewhat hard   Food Insecurity: No Food Insecurity (1/13/2025)    Hunger Vital Sign    • Worried About Running Out of Food in the Last Year: Never true    • Ran Out of Food in the Last Year: Never true   Transportation Needs: No Transportation Needs (1/13/2025)    PRAPARE - Transportation    • Lack of Transportation (Medical): No    • Lack of Transportation (Non-Medical): No   Physical Activity: Sufficiently Active (1/13/2025)    Exercise Vital Sign    • Days of Exercise per Week: 7 days    • Minutes of Exercise per Session: 60 min   Stress: Stress Concern Present (1/13/2025)    [place] [employer] - Occupational Stress Questionnaire    • Feeling of Stress : To some extent   Social Connections: Moderately Isolated (1/13/2025)    Social Connection and Isolation Panel [NHANES]    • Frequency of Communication with Friends and Family: Twice a week    • Frequency of Social Gatherings with Friends and Family: Twice a week    • Attends Jain Services: Never    • Active Member of Clubs or Organizations: No    • Attends Club or Organization Meetings: Never    • Marital Status:    Intimate Partner Violence: Not At Risk (1/13/2025)    Humiliation,  Afraid, Rape, and Kick questionnaire    • Fear of Current or Ex-Partner: No    • Emotionally Abused: No    • Physically Abused: No    • Sexually Abused: No   Housing Stability: Low Risk  (2025)    Housing Stability Vital Sign    • Unable to Pay for Housing in the Last Year: No    • Number of Times Moved in the Last Year: 0    • Homeless in the Last Year: No      Family History   Problem Relation Age of Onset   • Stroke Mother         CVA   • Heart failure Mother    • Diabetes Mother    • Hypertension Mother    • Thyroid disease Mother    • Gallbladder disease Mother         gallstones   • Diabetes Maternal Grandmother    • No Known Problems Maternal Grandfather    • No Known Problems Paternal Grandmother    • No Known Problems Paternal Grandfather    • Psychiatric Illness Maternal Aunt    • Breast cancer Maternal Aunt    • No Known Problems Maternal Aunt    • No Known Problems Maternal Aunt    • No Known Problems Brother    • No Known Problems Brother    • No Known Problems Brother    • No Known Problems Brother    • No Known Problems Brother    • No Known Problems Brother    • No Known Problems Brother    • No Known Problems Brother    • Breast cancer Cousin    • Cancer Cousin    • Substance Abuse Neg Hx    • Suicide Attempts Neg Hx      Past Surgical History:   Procedure Laterality Date   • CARDIOVERSION     •  SECTION         Current Outpatient Medications:   •  acetaminophen (TYLENOL) 325 mg tablet, Take 650 mg by mouth if needed for mild pain, Disp: , Rfl:   •  apixaban (Eliquis) 5 mg, Take 1 tablet (5 mg total) by mouth 2 (two) times a day, Disp: 180 tablet, Rfl: 3  •  ascorbic acid (VITAMIN C) 500 mg tablet, Take 500 mg by mouth daily, Disp: , Rfl:   •  benztropine (COGENTIN) 1 mg tablet, Take 1 tablet (1 mg total) by mouth 2 (two) times a day, Disp: 60 tablet, Rfl: 4  •  divalproex sodium (DEPAKOTE ER) 500 mg 24 hr tablet, Take 1 tablet (500 mg total) by mouth every evening, Disp: 30 tablet,  "Rfl: 4  •  furosemide (LASIX) 20 mg tablet, Take 1 tablet (20 mg total) by mouth 2 (two) times a day, Disp: 180 tablet, Rfl: 3  •  metoprolol tartrate (LOPRESSOR) 50 mg tablet, take 1 tablet by mouth every 12 hours, Disp: 180 tablet, Rfl: 1  •  Multiple Vitamin (MULTI-VITAMIN DAILY) TABS, Take 1 tablet by mouth daily, Disp: , Rfl:   •  potassium chloride (MICRO-K) 10 MEQ CR capsule, Take 1 capsule (10 mEq total) by mouth 2 (two) times a day, Disp: 180 capsule, Rfl: 3  •  risperiDONE (RisperDAL) 1 mg tablet, Take 1 tablet (1 mg total) by mouth every evening, Disp: 30 tablet, Rfl: 4  Allergies   Allergen Reactions   • Levaquin [Levofloxacin]      \"heavy legs\"   • Ampicillin Rash     Category: Allergy;    • Clindamycin Rash     Category: Allergy;        Imaging: Mammo diagnostic right w 3d and cad  Mammo diagnostic right w 3d and cad, US breast right limited (diagnostic)  Result Date: 1/14/2025  Narrative: DIAGNOSIS: Abnormal mammogram TECHNIQUE: Digital diagnostic mammography was performed. Computer Aided Detection (CAD) analyzed all applicable images. Right breast ultrasound was performed. COMPARISONS: Prior breast imaging dated: 11/11/2024, 06/13/2022, 09/13/2021, 03/10/2021, and 03/10/2021 RELEVANT HISTORY: Family Breast Cancer History: History of breast cancer in Maternal Aunt, Cousin. Family Medical History: Family medical history includes breast cancer in 2 relatives (cousin, maternal aunt). Personal History: No known relevant hormone history. No known relevant surgical history. No known relevant medical history. RISK ASSESSMENT: 5 Year Tyrer-Cuzick: 4.33% 10 Year Tyrer-Cuzick: 8.66% Lifetime Tyrer-Cuzick: 15.28% TISSUE DENSITY: The breasts are heterogeneously dense, which may obscure small masses. INDICATION: Nitza Vitale is a 69 y.o. female presenting for abnormal mammogram. FINDINGS: RIGHT 1) ASYMMETRY [B] Mammo diagnostic right w 3d and cad: Previously noted asymmetry in the upper right breast, middle third " "is not reproduced on current exam and represented summation artifact.  No suspicious mass, architectural distortion, or grouped calcifications are noted. Right breast ultrasound: Targeted breast ultrasound is performed using dedicated high-resolution probe.  No suspicious sonographic findings identified in the area of mammographic concern.     Impression: No evidence of malignancy. ASSESSMENT/BI-RADS CATEGORY: Right: 1 - Negative Overall: 1 - Negative RECOMMENDATION:      - Return to routine screening for the right breast. Workstation ID: [id number] Signed by:  Balbir Dhillon MD       Review of Systems:  Review of Systems   Constitutional: Negative.    HENT: Negative.     Respiratory: Negative.     Cardiovascular: Negative.    Endocrine: Negative.    Musculoskeletal: Negative.        Physical Exam:  /70 (BP Location: Left arm, Patient Position: Sitting, Cuff Size: Adult)   Pulse 83   Ht 5' 6\" (1.676 m)   Wt 104 kg (230 lb)   SpO2 97%   BMI 37.12 kg/m²   Physical Exam  Constitutional:       General: She is not in acute distress.     Appearance: She is not ill-appearing, toxic-appearing or diaphoretic.   HENT:      Nose: Nose normal. No congestion or rhinorrhea.   Neck:      Vascular: No carotid bruit.   Cardiovascular:      Rate and Rhythm: Normal rate and regular rhythm.      Heart sounds: No murmur heard.     No friction rub. No gallop.   Pulmonary:      Effort: Pulmonary effort is normal. No respiratory distress.      Breath sounds: No stridor. No wheezing or rhonchi.   Musculoskeletal:         General: Swelling (bilateral lower ext edema with stasis changes) present. No tenderness. Normal range of motion.      Cervical back: Normal range of motion. No rigidity or tenderness.   Lymphadenopathy:      Cervical: No cervical adenopathy.   Neurological:      Mental Status: She is alert.       This note was completed in part utilizing [employer] direct voice recognition software.   Grammatical " "errors, random word insertion, spelling mistakes, occasional wrong word or \"sound-alike\" substitutions and incomplete sentences may be an occasional consequence of the system secondary to software limitations, ambient noise and hardware issues. At the time of dictation, efforts were made to edit, clarify and /or correct errors.  Please read the chart carefully and recognize, using context, where substitutions have occurred.  If you have any questions or concerns about the context, text or information contained within the body of this dictation, please contact myself, the provider, for further clarification.  "

## 2025-01-24 NOTE — PROGRESS NOTES
Cardiology Follow Up    Nitza Vitale  1955  [id number]  [facility] [place]  [address]  [address]  [facility] [address] [phone]    No diagnosis found.    There are no diagnoses linked to this encounter.  I had the pleasure of seeing Nitza Vitale for a follow up visit.     INTERVAL HISTORY: 1st visit    History of the presenting illness, Discussion/Summary and My Plan are as follows:::    Nitza is a 69-year-old lady with a history of persistent atrial fibrillation originally diagnosed in 2018, has undergone cardioversions and sees Dr. Lewis.  Since she has had longstanding atrial fibrillation, rate control has been pursued.  She has remained on apixaban for anticoagulation.    She has impaired fasting glucose but no hypertension or diabetes or dyslipidemia, less than a 10-pack-year smoking history, walks her dog 4 times a day and takes her stairs at home, with no limitations or symptoms recently.    She has also had chronic lower extremity edema for more than 2 years, likely longer based on her stasis changes for which she is on furosemide 20 mg twice daily, notices that her swelling is more at the end of the day or when she keeps her feet down or with increased salt intake in her diet.    Last echocardiogram was overall unremarkable with moderate TR, preserved EF, PASP 36, IVC was not well-visualized.    Plan:    Persistent atrial fibrillation: Followed by Dr. Lewis, following rate control strategy, currently in rate controlled atrial fibrillation, on apixaban for anticoagulation without any bleeds or falls with stable renal function and hemoglobin    Chronic bilateral lower extremity edema: Likely from venous insufficiency, continue furosemide 20 mg twice daily but has not noticed much difference with that, will check lower extremity Dopplers to rule out venous insufficiency as well as DVT although unlikely she  would have DVT while being compliant with apixaban.    Obstructive sleep apnea: Has been compliant with CPAP.    Rate controlled atrial fibrillation and normal QT interval on ECG    Follow-up in 6 months, she lives in [place] and plans to follow here.         Latest Reference Range & Units 11/16/22 11:44 05/16/23 13:27 11/30/23 12:27 07/31/24 14:38   Cholesterol See Comment mg/dL 151 174 176 168   Triglycerides See Comment mg/dL 88 80 86 105   HDL >=50 mg/dL 47 (L) 55 48 (L) 46 (L)   Non-HDL Cholesterol mg/dl 104 119 128 122   LDL Calculated 0 - 100 mg/dL 86 103 (H) 111 (H) 101 (H)   (L): Data is abnormally low  (H): Data is abnormally high     Latest Reference Range & Units 07/31/24 14:38 12/23/24 13:30   BUN 5 - 25 mg/dL 18 19   Creatinine 0.60 - 1.30 mg/dL 0.64 0.70      Latest Reference Range & Units 07/31/24 14:38 12/23/24 13:30   Hemoglobin 11.5 - 15.4 g/dL 12.6 12.8   Hematocrit 34.8 - 46.1 % 39.2 41.5     Patient Active Problem List   Diagnosis    Bipolar I disorder, most recent episode mixed, in full remission (HCC)    Extrapyramidal reaction    Long-term use of high-risk medication    Hyponatremia    Other persistent atrial fibrillation (HCC)    DOMENIC on CPAP    Prediabetes    Edema of both lower legs    Chronic diastolic congestive heart failure (HCC)    Obesity, morbid (HCC)     Past Medical History:   Diagnosis Date    Atrial fibrillation (HCC) 2019    Bipolar disorder (HCC)     bipolar    Cataracts, bilateral     CHF (congestive heart failure) (HCC)     Leg edema     Prediabetes     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    Sleep apnea     Varicose veins of legs      Social History     Socioeconomic History    Marital status: /Civil Union     Spouse name: Not on file    Number of children: 1    Years of education: 12    Highest education level: 12th grade   Occupational History    Occupation: unemployed   Tobacco Use    Smoking status: Former     Current packs/day: 0.00     Average packs/day: 1  pack/day for 20.0 years (20.0 ttl pk-yrs)     Types: Cigarettes     Start date:      Quit date:      Years since quittin.0     Passive exposure: Past    Smokeless tobacco: Never   Vaping Use    Vaping status: Never Used   Substance and Sexual Activity    Alcohol use: No    Drug use: No    Sexual activity: Yes     Partners: Male   Other Topics Concern    Not on file   Social History Narrative    Education: high school graduate    Learning Disabilities: none    Marital History:     Children: 1 adult son    Living Arrangement: lives in home with     Occupational History: worked in [place] in the past, unemployed    Functioning Relationships: good support system,  is supportive    Legal History: none     History: None    Always uses seat belt      Social Drivers of Health     Financial Resource Strain: Medium Risk (2025)    Overall Financial Resource Strain (CARDIA)     Difficulty of Paying Living Expenses: Somewhat hard   Food Insecurity: No Food Insecurity (2025)    Hunger Vital Sign     Worried About Running Out of Food in the Last Year: Never true     Ran Out of Food in the Last Year: Never true   Transportation Needs: No Transportation Needs (2025)    PRAPARE - Transportation     Lack of Transportation (Medical): No     Lack of Transportation (Non-Medical): No   Physical Activity: Sufficiently Active (2025)    Exercise Vital Sign     Days of Exercise per Week: 7 days     Minutes of Exercise per Session: 60 min   Stress: Stress Concern Present (2025)    [place] [employer] - Occupational Stress Questionnaire     Feeling of Stress : To some extent   Social Connections: Moderately Isolated (2025)    Social Connection and Isolation Panel [NHANES]     Frequency of Communication with Friends and Family: Twice a week     Frequency of Social Gatherings with Friends and Family: Twice a week     Attends Rastafari Services: Never      Active Member of Clubs or Organizations: No     Attends Club or Organization Meetings: Never     Marital Status:    Intimate Partner Violence: Not At Risk (2025)    Humiliation, Afraid, Rape, and Kick questionnaire     Fear of Current or Ex-Partner: No     Emotionally Abused: No     Physically Abused: No     Sexually Abused: No   Housing Stability: Low Risk  (2025)    Housing Stability Vital Sign     Unable to Pay for Housing in the Last Year: No     Number of Times Moved in the Last Year: 0     Homeless in the Last Year: No      Family History   Problem Relation Age of Onset    Stroke Mother         CVA    Heart failure Mother     Diabetes Mother     Hypertension Mother     Thyroid disease Mother     Gallbladder disease Mother         gallstones    Diabetes Maternal Grandmother     No Known Problems Maternal Grandfather     No Known Problems Paternal Grandmother     No Known Problems Paternal Grandfather     Psychiatric Illness Maternal Aunt     Breast cancer Maternal Aunt     No Known Problems Maternal Aunt     No Known Problems Maternal Aunt     No Known Problems Brother     No Known Problems Brother     No Known Problems Brother     No Known Problems Brother     No Known Problems Brother     No Known Problems Brother     No Known Problems Brother     No Known Problems Brother     Breast cancer Cousin     Cancer Cousin     Substance Abuse Neg Hx     Suicide Attempts Neg Hx      Past Surgical History:   Procedure Laterality Date    CARDIOVERSION       SECTION         Current Outpatient Medications:     acetaminophen (TYLENOL) 325 mg tablet, Take 650 mg by mouth if needed for mild pain, Disp: , Rfl:     apixaban (Eliquis) 5 mg, Take 1 tablet (5 mg total) by mouth 2 (two) times a day, Disp: 180 tablet, Rfl: 3    ascorbic acid (VITAMIN C) 500 mg tablet, Take 500 mg by mouth daily, Disp: , Rfl:     benztropine (COGENTIN) 1 mg tablet, Take 1 tablet (1 mg total) by mouth 2 (two) times a day,  "Disp: 60 tablet, Rfl: 4    divalproex sodium (DEPAKOTE ER) 500 mg 24 hr tablet, Take 1 tablet (500 mg total) by mouth every evening, Disp: 30 tablet, Rfl: 4    furosemide (LASIX) 20 mg tablet, Take 1 tablet (20 mg total) by mouth 2 (two) times a day, Disp: 180 tablet, Rfl: 3    metoprolol tartrate (LOPRESSOR) 50 mg tablet, take 1 tablet by mouth every 12 hours, Disp: 180 tablet, Rfl: 1    Multiple Vitamin (MULTI-VITAMIN DAILY) TABS, Take 1 tablet by mouth daily, Disp: , Rfl:     potassium chloride (MICRO-K) 10 MEQ CR capsule, Take 1 capsule (10 mEq total) by mouth 2 (two) times a day, Disp: 180 capsule, Rfl: 3    risperiDONE (RisperDAL) 1 mg tablet, Take 1 tablet (1 mg total) by mouth every evening, Disp: 30 tablet, Rfl: 4  Allergies   Allergen Reactions    Levaquin [Levofloxacin]      \"heavy legs\"    Ampicillin Rash     Category: Allergy;     Clindamycin Rash     Category: Allergy;        Imaging: Mammo diagnostic right w 3d and cad  Mammo diagnostic right w 3d and cad, US breast right limited (diagnostic)  Result Date: 1/14/2025  Narrative: DIAGNOSIS: Abnormal mammogram TECHNIQUE: Digital diagnostic mammography was performed. Computer Aided Detection (CAD) analyzed all applicable images. Right breast ultrasound was performed. COMPARISONS: Prior breast imaging dated: 11/11/2024, 06/13/2022, 09/13/2021, 03/10/2021, and 03/10/2021 RELEVANT HISTORY: Family Breast Cancer History: History of breast cancer in Maternal Aunt, Cousin. Family Medical History: Family medical history includes breast cancer in 2 relatives (cousin, maternal aunt). Personal History: No known relevant hormone history. No known relevant surgical history. No known relevant medical history. RISK ASSESSMENT: 5 Year Tyrer-Cuzick: 4.33% 10 Year Tyrer-Cuzick: 8.66% Lifetime Tyrer-Cuzick: 15.28% TISSUE DENSITY: The breasts are heterogeneously dense, which may obscure small masses. INDICATION: Nitza Vitlae is a 69 y.o. female presenting for abnormal " "mammogram. FINDINGS: RIGHT 1) ASYMMETRY [B] Mammo diagnostic right w 3d and cad: Previously noted asymmetry in the upper right breast, middle third is not reproduced on current exam and represented summation artifact.  No suspicious mass, architectural distortion, or grouped calcifications are noted. Right breast ultrasound: Targeted breast ultrasound is performed using dedicated high-resolution probe.  No suspicious sonographic findings identified in the area of mammographic concern.     Impression: No evidence of malignancy. ASSESSMENT/BI-RADS CATEGORY: Right: 1 - Negative Overall: 1 - Negative RECOMMENDATION:      - Return to routine screening for the right breast. Workstation ID: [id number] Signed by:  Balbir Dhillon MD       Review of Systems:  Review of Systems   Constitutional: Negative.    HENT: Negative.     Respiratory: Negative.     Cardiovascular: Negative.    Endocrine: Negative.    Musculoskeletal: Negative.        Physical Exam:  /70 (BP Location: Left arm, Patient Position: Sitting, Cuff Size: Adult)   Pulse 83   Ht 5' 6\" (1.676 m)   Wt 104 kg (230 lb)   SpO2 97%   BMI 37.12 kg/m²   Physical Exam  Constitutional:       General: She is not in acute distress.     Appearance: She is not ill-appearing, toxic-appearing or diaphoretic.   HENT:      Nose: Nose normal. No congestion or rhinorrhea.   Neck:      Vascular: No carotid bruit.   Cardiovascular:      Rate and Rhythm: Normal rate and regular rhythm.      Heart sounds: No murmur heard.     No friction rub. No gallop.   Pulmonary:      Effort: Pulmonary effort is normal. No respiratory distress.      Breath sounds: No stridor. No wheezing or rhonchi.   Musculoskeletal:         General: Swelling (bilateral lower ext edema with stasis changes) present. No tenderness. Normal range of motion.      Cervical back: Normal range of motion. No rigidity or tenderness.   Lymphadenopathy:      Cervical: No cervical adenopathy.   Neurological: " "     Mental Status: She is alert.       This note was completed in part utilizing [employer] direct voice recognition software.   Grammatical errors, random word insertion, spelling mistakes, occasional wrong word or \"sound-alike\" substitutions and incomplete sentences may be an occasional consequence of the system secondary to software limitations, ambient noise and hardware issues. At the time of dictation, efforts were made to edit, clarify and /or correct errors.  Please read the chart carefully and recognize, using context, where substitutions have occurred.  If you have any questions or concerns about the context, text or information contained within the body of this dictation, please contact myself, the provider, for further clarification.  "

## 2025-01-24 NOTE — LETTER
January 24, 2025     Lisha Kamara PA-C  [address] [facility] [address]  [address]  [facility] [address]    Patient: Nitza Vitale   YOB: 1955   Date of Visit: 1/24/2025       Dear Dr. Kamara:    Thank you for referring Nitza Vitale to me for evaluation. Below are my notes for this consultation.    If you have questions, please do not hesitate to call me. I look forward to following your patient along with you.         Sincerely,        Kathy Frank MD        CC: MD Kathy Hayden MD  1/24/2025  2:25 PM  Incomplete                                             Cardiology Follow Up    Nitza Vitale  1955  [id number]  [facility] [address]  [address]  [facility] [address]  [phone]    No diagnosis found.    There are no diagnoses linked to this encounter.  I had the pleasure of seeing Nitza Vitale for a follow up visit.     INTERVAL HISTORY: 1st visit    History of the presenting illness, Discussion/Summary and My Plan are as follows:::    Nitza is a 69-year-old lady with a history of persistent atrial fibrillation originally diagnosed in 2018, has undergone cardioversions and sees Dr. Lewis.  Since she has had longstanding atrial fibrillation, rate control has been pursued.  She has remained on apixaban for anticoagulation.    She has impaired fasting glucose but no hypertension or diabetes or dyslipidemia, less than a 10-pack-year smoking history, walks her dog 4 times a day and takes her stairs at home, with no limitations or symptoms recently.    She has also had chronic lower extremity edema for more than 2 years, likely longer based on her stasis changes for which she is on furosemide 20 mg twice daily, notices that her swelling is more at the end of the day or when she keeps her feet down or with increased salt intake in her diet.    Last echocardiogram was overall unremarkable with moderate TR, preserved EF, PASP 36,  IVC was not well-visualized.    Plan:    Persistent atrial fibrillation: Followed by Dr. Lewis, following rate control strategy, currently in rate controlled atrial fibrillation, on apixaban for anticoagulation without any bleeds or falls with stable renal function and hemoglobin    Chronic bilateral lower extremity edema: Likely from venous insufficiency, continue furosemide 20 mg twice daily but has not noticed much difference with that, will check lower extremity Dopplers to rule out venous insufficiency as well as DVT although unlikely she would have DVT while being compliant with apixaban.    Obstructive sleep apnea: Has been compliant with CPAP.    Rate controlled atrial fibrillation and normal QT interval on ECG    Follow-up in 6 months, she lives in [place] and plans to follow here.         Latest Reference Range & Units 11/16/22 11:44 05/16/23 13:27 11/30/23 12:27 07/31/24 14:38   Cholesterol See Comment mg/dL 151 174 176 168   Triglycerides See Comment mg/dL 88 80 86 105   HDL >=50 mg/dL 47 (L) 55 48 (L) 46 (L)   Non-HDL Cholesterol mg/dl 104 119 128 122   LDL Calculated 0 - 100 mg/dL 86 103 (H) 111 (H) 101 (H)   (L): Data is abnormally low  (H): Data is abnormally high     Latest Reference Range & Units 07/31/24 14:38 12/23/24 13:30   BUN 5 - 25 mg/dL 18 19   Creatinine 0.60 - 1.30 mg/dL 0.64 0.70      Latest Reference Range & Units 07/31/24 14:38 12/23/24 13:30   Hemoglobin 11.5 - 15.4 g/dL 12.6 12.8   Hematocrit 34.8 - 46.1 % 39.2 41.5     Patient Active Problem List   Diagnosis   • Bipolar I disorder, most recent episode mixed, in full remission (HCC)   • Extrapyramidal reaction   • Long-term use of high-risk medication   • Hyponatremia   • Other persistent atrial fibrillation (HCC)   • DOMENIC on CPAP   • Prediabetes   • Edema of both lower legs   • Chronic diastolic congestive heart failure (HCC)   • Obesity, morbid (HCC)     Past Medical History:   Diagnosis Date   • Atrial fibrillation (HCC) 2019   •  Bipolar disorder (HCC)     bipolar   • Cataracts, bilateral    • CHF (congestive heart failure) ([facility])    • Leg edema    • Prediabetes    • 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   • Sleep apnea    • Varicose veins of legs      Social History     Socioeconomic History   • Marital status: /Civil Union     Spouse name: Not on file   • Number of children: 1   • Years of education: 12   • Highest education level: 12th grade   Occupational History   • Occupation: unemployed   Tobacco Use   • Smoking status: Former     Current packs/day: 0.00     Average packs/day: 1 pack/day for 20.0 years (20.0 ttl pk-yrs)     Types: Cigarettes     Start date:      Quit date:      Years since quittin.0     Passive exposure: Past   • Smokeless tobacco: Never   Vaping Use   • Vaping status: Never Used   Substance and Sexual Activity   • Alcohol use: No   • Drug use: No   • Sexual activity: Yes     Partners: Male   Other Topics Concern   • Not on file   Social History Narrative    Education: high school graduate    Learning Disabilities: none    Marital History:     Children: 1 adult son    Living Arrangement: lives in home with     Occupational History: worked in [employer] in the past, unemployed    Functioning Relationships: good support system,  is supportive    Legal History: none     History: None    Always uses seat belt      Social Drivers of Health     Financial Resource Strain: Medium Risk (2025)    Overall Financial Resource Strain (CARDIA)    • Difficulty of Paying Living Expenses: Somewhat hard   Food Insecurity: No Food Insecurity (2025)    Hunger Vital Sign    • Worried About Running Out of Food in the Last Year: Never true    • Ran Out of Food in the Last Year: Never true   Transportation Needs: No Transportation Needs (2025)    PRAPARE - Transportation    • Lack of Transportation (Medical): No    • Lack of Transportation (Non-Medical): No   Physical Activity:  Sufficiently Active (1/13/2025)    Exercise Vital Sign    • Days of Exercise per Week: 7 days    • Minutes of Exercise per Session: 60 min   Stress: Stress Concern Present (1/13/2025)    [employer] - Occupational Stress Questionnaire    • Feeling of Stress : To some extent   Social Connections: Moderately Isolated (1/13/2025)    Social Connection and Isolation Panel [NHANES]    • Frequency of Communication with Friends and Family: Twice a week    • Frequency of Social Gatherings with Friends and Family: Twice a week    • Attends Confucianist Services: Never    • Active Member of Clubs or Organizations: No    • Attends Club or Organization Meetings: Never    • Marital Status:    Intimate Partner Violence: Not At Risk (1/13/2025)    Humiliation, Afraid, Rape, and Kick questionnaire    • Fear of Current or Ex-Partner: No    • Emotionally Abused: No    • Physically Abused: No    • Sexually Abused: No   Housing Stability: Low Risk  (1/13/2025)    Housing Stability Vital Sign    • Unable to Pay for Housing in the Last Year: No    • Number of Times Moved in the Last Year: 0    • Homeless in the Last Year: No      Family History   Problem Relation Age of Onset   • Stroke Mother         CVA   • Heart failure Mother    • Diabetes Mother    • Hypertension Mother    • Thyroid disease Mother    • Gallbladder disease Mother         gallstones   • Diabetes Maternal Grandmother    • No Known Problems Maternal Grandfather    • No Known Problems Paternal Grandmother    • No Known Problems Paternal Grandfather    • Psychiatric Illness Maternal Aunt    • Breast cancer Maternal Aunt    • No Known Problems Maternal Aunt    • No Known Problems Maternal Aunt    • No Known Problems Brother    • No Known Problems Brother    • No Known Problems Brother    • No Known Problems Brother    • No Known Problems Brother    • No Known Problems Brother    • No Known Problems Brother    • No Known Problems Brother    •  "Breast cancer Cousin    • Cancer Cousin    • Substance Abuse Neg Hx    • Suicide Attempts Neg Hx      Past Surgical History:   Procedure Laterality Date   • CARDIOVERSION     •  SECTION         Current Outpatient Medications:   •  acetaminophen (TYLENOL) 325 mg tablet, Take 650 mg by mouth if needed for mild pain, Disp: , Rfl:   •  apixaban (Eliquis) 5 mg, Take 1 tablet (5 mg total) by mouth 2 (two) times a day, Disp: 180 tablet, Rfl: 3  •  ascorbic acid (VITAMIN C) 500 mg tablet, Take 500 mg by mouth daily, Disp: , Rfl:   •  benztropine (COGENTIN) 1 mg tablet, Take 1 tablet (1 mg total) by mouth 2 (two) times a day, Disp: 60 tablet, Rfl: 4  •  divalproex sodium (DEPAKOTE ER) 500 mg 24 hr tablet, Take 1 tablet (500 mg total) by mouth every evening, Disp: 30 tablet, Rfl: 4  •  furosemide (LASIX) 20 mg tablet, Take 1 tablet (20 mg total) by mouth 2 (two) times a day, Disp: 180 tablet, Rfl: 3  •  metoprolol tartrate (LOPRESSOR) 50 mg tablet, take 1 tablet by mouth every 12 hours, Disp: 180 tablet, Rfl: 1  •  Multiple Vitamin (MULTI-VITAMIN DAILY) TABS, Take 1 tablet by mouth daily, Disp: , Rfl:   •  potassium chloride (MICRO-K) 10 MEQ CR capsule, Take 1 capsule (10 mEq total) by mouth 2 (two) times a day, Disp: 180 capsule, Rfl: 3  •  risperiDONE (RisperDAL) 1 mg tablet, Take 1 tablet (1 mg total) by mouth every evening, Disp: 30 tablet, Rfl: 4  Allergies   Allergen Reactions   • Levaquin [Levofloxacin]      \"heavy legs\"   • Ampicillin Rash     Category: Allergy;    • Clindamycin Rash     Category: Allergy;        Imaging: Mammo diagnostic right w 3d and cad  Mammo diagnostic right w 3d and cad, US breast right limited (diagnostic)  Result Date: 2025  Narrative: DIAGNOSIS: Abnormal mammogram TECHNIQUE: Digital diagnostic mammography was performed. Computer Aided Detection (CAD) analyzed all applicable images. Right breast ultrasound was performed. COMPARISONS: Prior breast imaging dated: 2024, " "06/13/2022, 09/13/2021, 03/10/2021, and 03/10/2021 RELEVANT HISTORY: Family Breast Cancer History: History of breast cancer in Maternal Aunt, Cousin. Family Medical History: Family medical history includes breast cancer in 2 relatives (cousin, maternal aunt). Personal History: No known relevant hormone history. No known relevant surgical history. No known relevant medical history. RISK ASSESSMENT: 5 Year Tyrer-Cuzick: 4.33% 10 Year Tyrer-Cuzick: 8.66% Lifetime Tyrer-Cuzick: 15.28% TISSUE DENSITY: The breasts are heterogeneously dense, which may obscure small masses. INDICATION: Nitza Vitale is a 69 y.o. female presenting for abnormal mammogram. FINDINGS: RIGHT 1) ASYMMETRY [B] Mammo diagnostic right w 3d and cad: Previously noted asymmetry in the upper right breast, middle third is not reproduced on current exam and represented summation artifact.  No suspicious mass, architectural distortion, or grouped calcifications are noted. Right breast ultrasound: Targeted breast ultrasound is performed using dedicated high-resolution probe.  No suspicious sonographic findings identified in the area of mammographic concern.     Impression: No evidence of malignancy. ASSESSMENT/BI-RADS CATEGORY: Right: 1 - Negative Overall: 1 - Negative RECOMMENDATION:      - Return to routine screening for the right breast. Workstation ID: [id number] Signed by:  Balbir Dhillon MD       Review of Systems:  Review of Systems   Constitutional: Negative.    HENT: Negative.     Respiratory: Negative.     Cardiovascular: Negative.    Endocrine: Negative.    Musculoskeletal: Negative.        Physical Exam:  /70 (BP Location: Left arm, Patient Position: Sitting, Cuff Size: Adult)   Pulse 83   Ht 5' 6\" (1.676 m)   Wt 104 kg (230 lb)   SpO2 97%   BMI 37.12 kg/m²   Physical Exam  Constitutional:       General: She is not in acute distress.     Appearance: She is not ill-appearing, toxic-appearing or diaphoretic.   HENT:      Nose: " "Nose normal. No congestion or rhinorrhea.   Neck:      Vascular: No carotid bruit.   Cardiovascular:      Rate and Rhythm: Normal rate and regular rhythm.      Heart sounds: No murmur heard.     No friction rub. No gallop.   Pulmonary:      Effort: Pulmonary effort is normal. No respiratory distress.      Breath sounds: No stridor. No wheezing or rhonchi.   Musculoskeletal:         General: Swelling (bilateral lower ext edema with stasis changes) present. No tenderness. Normal range of motion.      Cervical back: Normal range of motion. No rigidity or tenderness.   Lymphadenopathy:      Cervical: No cervical adenopathy.   Neurological:      Mental Status: She is alert.       This note was completed in part utilizing [employer] direct voice recognition software.   Grammatical errors, random word insertion, spelling mistakes, occasional wrong word or \"sound-alike\" substitutions and incomplete sentences may be an occasional consequence of the system secondary to software limitations, ambient noise and hardware issues. At the time of dictation, efforts were made to edit, clarify and /or correct errors.  Please read the chart carefully and recognize, using context, where substitutions have occurred.  If you have any questions or concerns about the context, text or information contained within the body of this dictation, please contact myself, the provider, for further clarification.    Kathy Frank MD  1/24/2025  2:20 PM  Sign when Signing Visit                                             Cardiology Follow Up    Nitza Vitale  1955  [id number]  [facility] [place] [address]  [facility] [address]  [phone]  [phone]    No diagnosis found.    There are no diagnoses linked to this encounter.  I had the pleasure of seeing Nitza BRADSHAW Winifred for a follow up visit.     INTERVAL HISTORY: 1st visit    History of the presenting illness, Discussion/Summary and My Plan are " as follows:::    ***         Latest Reference Range & Units 22 11:44 23 13:27 23 12:27 24 14:38   Cholesterol See Comment mg/dL 151 174 176 168   Triglycerides See Comment mg/dL 88 80 86 105   HDL >=50 mg/dL 47 (L) 55 48 (L) 46 (L)   Non-HDL Cholesterol mg/dl 104 119 128 122   LDL Calculated 0 - 100 mg/dL 86 103 (H) 111 (H) 101 (H)   (L): Data is abnormally low  (H): Data is abnormally high     Latest Reference Range & Units 24 14:38 24 13:30   BUN 5 - 25 mg/dL 18 19   Creatinine 0.60 - 1.30 mg/dL 0.64 0.70      Latest Reference Range & Units 24 14:38 24 13:30   Hemoglobin 11.5 - 15.4 g/dL 12.6 12.8   Hematocrit 34.8 - 46.1 % 39.2 41.5     Patient Active Problem List   Diagnosis   • Bipolar I disorder, most recent episode mixed, in full remission (HCC)   • Extrapyramidal reaction   • Long-term use of high-risk medication   • Hyponatremia   • Other persistent atrial fibrillation (HCC)   • DOMENIC on CPAP   • Prediabetes   • Edema of both lower legs   • Chronic diastolic congestive heart failure (HCC)   • Obesity, morbid (HCC)     Past Medical History:   Diagnosis Date   • Atrial fibrillation (HCC)    • Bipolar disorder (HCC)     bipolar   • Cataracts, bilateral    • CHF (congestive heart failure) ([facility])    • Leg edema    • Prediabetes    • 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   • Sleep apnea    • Varicose veins of legs      Social History     Socioeconomic History   • Marital status: /Civil Union     Spouse name: Not on file   • Number of children: 1   • Years of education: 12   • Highest education level: 12th grade   Occupational History   • Occupation: unemployed   Tobacco Use   • Smoking status: Former     Current packs/day: 0.00     Average packs/day: 1 pack/day for 20.0 years (20.0 ttl pk-yrs)     Types: Cigarettes     Start date:      Quit date:      Years since quittin.0     Passive exposure: Past   • Smokeless tobacco: Never   Vaping Use   •  Vaping status: Never Used   Substance and Sexual Activity   • Alcohol use: No   • Drug use: No   • Sexual activity: Yes     Partners: Male   Other Topics Concern   • Not on file   Social History Narrative    Education: high school graduate    Learning Disabilities: none    Marital History:     Children: 1 adult son    Living Arrangement: lives in home with     Occupational History: worked in [employer] in the past, unemployed    Functioning Relationships: good support system,  is supportive    Legal History: none     History: None    Always uses seat belt      Social Drivers of Health     Financial Resource Strain: Medium Risk (1/13/2025)    Overall Financial Resource Strain (CARDIA)    • Difficulty of Paying Living Expenses: Somewhat hard   Food Insecurity: No Food Insecurity (1/13/2025)    Hunger Vital Sign    • Worried About Running Out of Food in the Last Year: Never true    • Ran Out of Food in the Last Year: Never true   Transportation Needs: No Transportation Needs (1/13/2025)    PRAPARE - Transportation    • Lack of Transportation (Medical): No    • Lack of Transportation (Non-Medical): No   Physical Activity: Sufficiently Active (1/13/2025)    Exercise Vital Sign    • Days of Exercise per Week: 7 days    • Minutes of Exercise per Session: 60 min   Stress: Stress Concern Present (1/13/2025)    [employer] - Occupational Stress Questionnaire    • Feeling of Stress : To some extent   Social Connections: Moderately Isolated (1/13/2025)    Social Connection and Isolation Panel [NHANES]    • Frequency of Communication with Friends and Family: Twice a week    • Frequency of Social Gatherings with Friends and Family: Twice a week    • Attends Oriental orthodox Services: Never    • Active Member of Clubs or Organizations: No    • Attends Club or Organization Meetings: Never    • Marital Status:    Intimate Partner Violence: Not At Risk (1/13/2025)    Humiliation,  Afraid, Rape, and Kick questionnaire    • Fear of Current or Ex-Partner: No    • Emotionally Abused: No    • Physically Abused: No    • Sexually Abused: No   Housing Stability: Low Risk  (2025)    Housing Stability Vital Sign    • Unable to Pay for Housing in the Last Year: No    • Number of Times Moved in the Last Year: 0    • Homeless in the Last Year: No      Family History   Problem Relation Age of Onset   • Stroke Mother         CVA   • Heart failure Mother    • Diabetes Mother    • Hypertension Mother    • Thyroid disease Mother    • Gallbladder disease Mother         gallstones   • Diabetes Maternal Grandmother    • No Known Problems Maternal Grandfather    • No Known Problems Paternal Grandmother    • No Known Problems Paternal Grandfather    • Psychiatric Illness Maternal Aunt    • Breast cancer Maternal Aunt    • No Known Problems Maternal Aunt    • No Known Problems Maternal Aunt    • No Known Problems Brother    • No Known Problems Brother    • No Known Problems Brother    • No Known Problems Brother    • No Known Problems Brother    • No Known Problems Brother    • No Known Problems Brother    • No Known Problems Brother    • Breast cancer Cousin    • Cancer Cousin    • Substance Abuse Neg Hx    • Suicide Attempts Neg Hx      Past Surgical History:   Procedure Laterality Date   • CARDIOVERSION     •  SECTION         Current Outpatient Medications:   •  acetaminophen (TYLENOL) 325 mg tablet, Take 650 mg by mouth if needed for mild pain, Disp: , Rfl:   •  apixaban (Eliquis) 5 mg, Take 1 tablet (5 mg total) by mouth 2 (two) times a day, Disp: 180 tablet, Rfl: 3  •  ascorbic acid (VITAMIN C) 500 mg tablet, Take 500 mg by mouth daily, Disp: , Rfl:   •  benztropine (COGENTIN) 1 mg tablet, Take 1 tablet (1 mg total) by mouth 2 (two) times a day, Disp: 60 tablet, Rfl: 4  •  divalproex sodium (DEPAKOTE ER) 500 mg 24 hr tablet, Take 1 tablet (500 mg total) by mouth every evening, Disp: 30 tablet,  "Rfl: 4  •  furosemide (LASIX) 20 mg tablet, Take 1 tablet (20 mg total) by mouth 2 (two) times a day, Disp: 180 tablet, Rfl: 3  •  metoprolol tartrate (LOPRESSOR) 50 mg tablet, take 1 tablet by mouth every 12 hours, Disp: 180 tablet, Rfl: 1  •  Multiple Vitamin (MULTI-VITAMIN DAILY) TABS, Take 1 tablet by mouth daily, Disp: , Rfl:   •  potassium chloride (MICRO-K) 10 MEQ CR capsule, Take 1 capsule (10 mEq total) by mouth 2 (two) times a day, Disp: 180 capsule, Rfl: 3  •  risperiDONE (RisperDAL) 1 mg tablet, Take 1 tablet (1 mg total) by mouth every evening, Disp: 30 tablet, Rfl: 4  Allergies   Allergen Reactions   • Levaquin [Levofloxacin]      \"heavy legs\"   • Ampicillin Rash     Category: Allergy;    • Clindamycin Rash     Category: Allergy;        Imaging: Mammo diagnostic right w 3d and cad  Mammo diagnostic right w 3d and cad, US breast right limited (diagnostic)  Result Date: 1/14/2025  Narrative: DIAGNOSIS: Abnormal mammogram TECHNIQUE: Digital diagnostic mammography was performed. Computer Aided Detection (CAD) analyzed all applicable images. Right breast ultrasound was performed. COMPARISONS: Prior breast imaging dated: 11/11/2024, 06/13/2022, 09/13/2021, 03/10/2021, and 03/10/2021 RELEVANT HISTORY: Family Breast Cancer History: History of breast cancer in Maternal Aunt, Cousin. Family Medical History: Family medical history includes breast cancer in 2 relatives (cousin, maternal aunt). Personal History: No known relevant hormone history. No known relevant surgical history. No known relevant medical history. RISK ASSESSMENT: 5 Year Tyrer-Cuzick: 4.33% 10 Year Tyrer-Cuzick: 8.66% Lifetime Tyrer-Cuzick: 15.28% TISSUE DENSITY: The breasts are heterogeneously dense, which may obscure small masses. INDICATION: Nitza Vitale is a 69 y.o. female presenting for abnormal mammogram. FINDINGS: RIGHT 1) ASYMMETRY [B] Mammo diagnostic right w 3d and cad: Previously noted asymmetry in the upper right breast, middle third " "is not reproduced on current exam and represented summation artifact.  No suspicious mass, architectural distortion, or grouped calcifications are noted. Right breast ultrasound: Targeted breast ultrasound is performed using dedicated high-resolution probe.  No suspicious sonographic findings identified in the area of mammographic concern.     Impression: No evidence of malignancy. ASSESSMENT/BI-RADS CATEGORY: Right: 1 - Negative Overall: 1 - Negative RECOMMENDATION:      - Return to routine screening for the right breast. Workstation ID: [id number] Signed by:  Balbir Dhillon MD       Review of Systems:  Review of Systems   Constitutional: Negative.    HENT: Negative.     Respiratory: Negative.     Cardiovascular: Negative.    Endocrine: Negative.    Musculoskeletal: Negative.        Physical Exam:  /70 (BP Location: Left arm, Patient Position: Sitting, Cuff Size: Adult)   Pulse 83   Ht 5' 6\" (1.676 m)   Wt 104 kg (230 lb)   SpO2 97%   BMI 37.12 kg/m²   Physical Exam  Constitutional:       General: She is not in acute distress.     Appearance: She is not ill-appearing, toxic-appearing or diaphoretic.   HENT:      Nose: Nose normal. No congestion or rhinorrhea.   Neck:      Vascular: No carotid bruit.   Cardiovascular:      Rate and Rhythm: Normal rate and regular rhythm.      Heart sounds: No murmur heard.     No friction rub. No gallop.   Pulmonary:      Effort: Pulmonary effort is normal. No respiratory distress.      Breath sounds: No stridor. No wheezing or rhonchi.   Musculoskeletal:         General: Swelling (bilateral lower ext edema with stasis changes) present. No tenderness. Normal range of motion.      Cervical back: Normal range of motion. No rigidity or tenderness.   Lymphadenopathy:      Cervical: No cervical adenopathy.   Neurological:      Mental Status: She is alert.       This note was completed in part utilizing [employer] medical fluency direct voice recognition software.   Grammatical " "errors, random word insertion, spelling mistakes, occasional wrong word or \"sound-alike\" substitutions and incomplete sentences may be an occasional consequence of the system secondary to software limitations, ambient noise and hardware issues. At the time of dictation, efforts were made to edit, clarify and /or correct errors.  Please read the chart carefully and recognize, using context, where substitutions have occurred.  If you have any questions or concerns about the context, text or information contained within the body of this dictation, please contact myself, the provider, for further clarification.    "

## 2025-01-27 ENCOUNTER — HOSPITAL ENCOUNTER (OUTPATIENT)
Dept: NON INVASIVE DIAGNOSTICS | Facility: CLINIC | Age: 70
Discharge: HOME | End: 2025-01-27
Payer: MEDICARE

## 2025-01-27 DIAGNOSIS — R60.0 EDEMA OF BOTH LOWER LEGS: ICD-10-CM

## 2025-01-27 PROCEDURE — 93000 ELECTROCARDIOGRAM COMPLETE: CPT | Performed by: INTERNAL MEDICINE

## 2025-01-27 PROCEDURE — 93970 EXTREMITY STUDY: CPT

## 2025-01-27 PROCEDURE — 93970 EXTREMITY STUDY: CPT | Performed by: SURGERY

## 2025-01-28 ENCOUNTER — RESULTS FOLLOW-UP (OUTPATIENT)
Dept: CARDIOLOGY CLINIC | Facility: CLINIC | Age: 70
End: 2025-01-28

## 2025-01-28 NOTE — TELEPHONE ENCOUNTER
Pt called regarding results, and states she got a notification about scheduling visit.  Advised pt of message from Dr. Frank, she verbalized understanding.  Pt will be due for 6 mo f/u, which she has scheduled for August.

## 2025-01-29 ENCOUNTER — OFFICE VISIT (OUTPATIENT)
Dept: CARDIOLOGY CLINIC | Facility: CLINIC | Age: 70
End: 2025-01-29
Payer: MEDICARE

## 2025-01-29 VITALS
HEIGHT: 66 IN | WEIGHT: 230 LBS | SYSTOLIC BLOOD PRESSURE: 116 MMHG | HEART RATE: 73 BPM | BODY MASS INDEX: 36.96 KG/M2 | DIASTOLIC BLOOD PRESSURE: 80 MMHG

## 2025-01-29 DIAGNOSIS — I50.32 CHRONIC DIASTOLIC CONGESTIVE HEART FAILURE (HCC): ICD-10-CM

## 2025-01-29 DIAGNOSIS — F31.78 BIPOLAR I DISORDER, MOST RECENT EPISODE MIXED, IN FULL REMISSION (HCC): Chronic | ICD-10-CM

## 2025-01-29 DIAGNOSIS — I48.19 OTHER PERSISTENT ATRIAL FIBRILLATION (HCC): Primary | ICD-10-CM

## 2025-01-29 DIAGNOSIS — G47.33 OSA ON CPAP: ICD-10-CM

## 2025-01-29 DIAGNOSIS — E66.01 OBESITY, MORBID (HCC): ICD-10-CM

## 2025-01-29 PROCEDURE — 93000 ELECTROCARDIOGRAM COMPLETE: CPT

## 2025-01-29 PROCEDURE — 99214 OFFICE O/P EST MOD 30 MIN: CPT

## 2025-04-08 ENCOUNTER — APPOINTMENT (OUTPATIENT)
Dept: LAB | Facility: MEDICAL CENTER | Age: 70
End: 2025-04-08
Payer: MEDICARE

## 2025-04-08 DIAGNOSIS — Z79.899 LONG-TERM USE OF HIGH-RISK MEDICATION: Chronic | ICD-10-CM

## 2025-04-08 DIAGNOSIS — Z79.899 ENCOUNTER FOR LONG-TERM (CURRENT) USE OF HIGH-RISK MEDICATION: ICD-10-CM

## 2025-04-08 DIAGNOSIS — F31.78 BIPOLAR I DISORDER, MOST RECENT EPISODE MIXED, IN FULL REMISSION (HCC): Chronic | ICD-10-CM

## 2025-04-08 LAB
ALBUMIN SERPL BCG-MCNC: 4.3 G/DL (ref 3.5–5)
ALP SERPL-CCNC: 53 U/L (ref 34–104)
ALT SERPL W P-5'-P-CCNC: 15 U/L (ref 7–52)
ANION GAP SERPL CALCULATED.3IONS-SCNC: 11 MMOL/L (ref 4–13)
AST SERPL W P-5'-P-CCNC: 23 U/L (ref 13–39)
BASOPHILS # BLD AUTO: 0.09 THOUSANDS/ÂΜL (ref 0–0.1)
BASOPHILS NFR BLD AUTO: 1 % (ref 0–1)
BILIRUB SERPL-MCNC: 0.95 MG/DL (ref 0.2–1)
BUN SERPL-MCNC: 15 MG/DL (ref 5–25)
CALCIUM SERPL-MCNC: 9.4 MG/DL (ref 8.4–10.2)
CHLORIDE SERPL-SCNC: 99 MMOL/L (ref 96–108)
CHOLEST SERPL-MCNC: 193 MG/DL (ref ?–200)
CO2 SERPL-SCNC: 28 MMOL/L (ref 21–32)
CREAT SERPL-MCNC: 0.67 MG/DL (ref 0.6–1.3)
EOSINOPHIL # BLD AUTO: 0.17 THOUSAND/ÂΜL (ref 0–0.61)
EOSINOPHIL NFR BLD AUTO: 3 % (ref 0–6)
ERYTHROCYTE [DISTWIDTH] IN BLOOD BY AUTOMATED COUNT: 14.5 % (ref 11.6–15.1)
GFR SERPL CREATININE-BSD FRML MDRD: 90 ML/MIN/1.73SQ M
GLUCOSE SERPL-MCNC: 80 MG/DL (ref 65–140)
HCT VFR BLD AUTO: 41.6 % (ref 34.8–46.1)
HDLC SERPL-MCNC: 41 MG/DL
HGB BLD-MCNC: 13.4 G/DL (ref 11.5–15.4)
IMM GRANULOCYTES # BLD AUTO: 0.02 THOUSAND/UL (ref 0–0.2)
IMM GRANULOCYTES NFR BLD AUTO: 0 % (ref 0–2)
LDLC SERPL CALC-MCNC: 126 MG/DL (ref 0–100)
LYMPHOCYTES # BLD AUTO: 2.09 THOUSANDS/ÂΜL (ref 0.6–4.47)
LYMPHOCYTES NFR BLD AUTO: 31 % (ref 14–44)
MCH RBC QN AUTO: 29.1 PG (ref 26.8–34.3)
MCHC RBC AUTO-ENTMCNC: 32.2 G/DL (ref 31.4–37.4)
MCV RBC AUTO: 90 FL (ref 82–98)
MONOCYTES # BLD AUTO: 0.6 THOUSAND/ÂΜL (ref 0.17–1.22)
MONOCYTES NFR BLD AUTO: 9 % (ref 4–12)
NEUTROPHILS # BLD AUTO: 3.88 THOUSANDS/ÂΜL (ref 1.85–7.62)
NEUTS SEG NFR BLD AUTO: 56 % (ref 43–75)
NONHDLC SERPL-MCNC: 152 MG/DL
NRBC BLD AUTO-RTO: 0 /100 WBCS
PLATELET # BLD AUTO: 212 THOUSANDS/UL (ref 149–390)
PMV BLD AUTO: 12.1 FL (ref 8.9–12.7)
POTASSIUM SERPL-SCNC: 4.3 MMOL/L (ref 3.5–5.3)
PROT SERPL-MCNC: 7.1 G/DL (ref 6.4–8.4)
RBC # BLD AUTO: 4.6 MILLION/UL (ref 3.81–5.12)
SODIUM SERPL-SCNC: 138 MMOL/L (ref 135–147)
TRIGL SERPL-MCNC: 131 MG/DL (ref ?–150)
VALPROATE SERPL-MCNC: 45 UG/ML (ref 50–125)
WBC # BLD AUTO: 6.85 THOUSAND/UL (ref 4.31–10.16)

## 2025-04-08 PROCEDURE — 80053 COMPREHEN METABOLIC PANEL: CPT

## 2025-04-08 PROCEDURE — 36415 COLL VENOUS BLD VENIPUNCTURE: CPT

## 2025-04-08 PROCEDURE — 85025 COMPLETE CBC W/AUTO DIFF WBC: CPT

## 2025-04-08 PROCEDURE — 80164 ASSAY DIPROPYLACETIC ACD TOT: CPT

## 2025-04-08 PROCEDURE — 80061 LIPID PANEL: CPT

## 2025-04-09 ENCOUNTER — RESULTS FOLLOW-UP (OUTPATIENT)
Dept: PSYCHIATRY | Facility: CLINIC | Age: 70
End: 2025-04-09

## 2025-04-09 NOTE — RESULT ENCOUNTER NOTE
Depakote level is subtherapeutic at 45. Will review with Nitza at her next visit. CBC/diff, CMP and lipid profile are acceptable. Razz message was sent to Nitza regarding her labs.
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
## 2025-05-14 ENCOUNTER — OFFICE VISIT (OUTPATIENT)
Dept: PSYCHIATRY | Facility: CLINIC | Age: 70
End: 2025-05-14
Payer: MEDICARE

## 2025-05-14 VITALS
BODY MASS INDEX: 36.1 KG/M2 | WEIGHT: 230 LBS | HEIGHT: 67 IN | DIASTOLIC BLOOD PRESSURE: 69 MMHG | SYSTOLIC BLOOD PRESSURE: 116 MMHG | HEART RATE: 93 BPM

## 2025-05-14 DIAGNOSIS — F31.78 BIPOLAR I DISORDER, MOST RECENT EPISODE MIXED, IN FULL REMISSION (HCC): Primary | Chronic | ICD-10-CM

## 2025-05-14 DIAGNOSIS — Z79.899 LONG-TERM USE OF HIGH-RISK MEDICATION: Chronic | ICD-10-CM

## 2025-05-14 DIAGNOSIS — G25.9 EXTRAPYRAMIDAL REACTION: Chronic | ICD-10-CM

## 2025-05-14 PROCEDURE — 90833 PSYTX W PT W E/M 30 MIN: CPT | Performed by: PSYCHIATRY & NEUROLOGY

## 2025-05-14 PROCEDURE — 99214 OFFICE O/P EST MOD 30 MIN: CPT | Performed by: PSYCHIATRY & NEUROLOGY

## 2025-05-14 PROCEDURE — G2211 COMPLEX E/M VISIT ADD ON: HCPCS | Performed by: PSYCHIATRY & NEUROLOGY

## 2025-05-14 RX ORDER — RISPERIDONE 1 MG/1
1 TABLET ORAL EVERY EVENING
Qty: 30 TABLET | Refills: 4 | Status: SHIPPED | OUTPATIENT
Start: 2025-05-14 | End: 2025-10-11

## 2025-05-14 RX ORDER — BENZTROPINE MESYLATE 1 MG/1
1 TABLET ORAL 2 TIMES DAILY
Qty: 60 TABLET | Refills: 4 | Status: SHIPPED | OUTPATIENT
Start: 2025-05-14 | End: 2025-10-11

## 2025-05-14 RX ORDER — DIVALPROEX SODIUM 500 MG/1
500 TABLET, FILM COATED, EXTENDED RELEASE ORAL EVERY EVENING
Qty: 30 TABLET | Refills: 4 | Status: SHIPPED | OUTPATIENT
Start: 2025-05-14 | End: 2025-10-11

## 2025-05-14 NOTE — ASSESSMENT & PLAN NOTE
Minimal hand tremor    Continue Cogentin 1 mg twice a day to help with extrapyramidal symptoms. Nitza wants to continue current Cogentin dose as she is concerned that tremor would worsen on lower dose  Orders:    benztropine (COGENTIN) 1 mg tablet; Take 1 tablet (1 mg total) by mouth 2 (two) times a day

## 2025-05-14 NOTE — BH TREATMENT PLAN
"TREATMENT PLAN (Medication Management Only)        [facility]    Name/Date of Birth/MRN:  Nitza Vitale 69 y.o. 1955 MRN: [id number]  Date of Treatment Plan: May 14, 2025  Diagnosis/Diagnoses:   1. Bipolar I disorder, most recent episode mixed, in full remission (HCC)    2. Extrapyramidal reaction    3. Long-term use of high-risk medication      Strengths/Personal Resources for Self-Care: \"trying to take care of myself all the time\".  Area/Areas of need (in own words): \"getting work done\".  1. Long Term Goal:   maintain control of anxiety, maintain control of depression, maintain mood stability  Target Date: 4 months - 9/14/2025  Person/Persons responsible for completion of goal: Nitza  2.  Short Term Objective (s) - How will we reach this goal?:   A.  Provider new recommended medication/dosage changes and/or continue medication(s): continue current medications as prescribed (Depakote ER, Risperdal, and Cogentin).  B.  N/A.  C.  N/A.  Target Date: 4 months - 9/14/2025  Person/Persons Responsible for Completion of Goal: Nitza   Progress Towards Goals: stable  Treatment Modality: medication management every 4 months  Review due 180 days from date of this plan: 6 months - 11/14/2025  Expected length of service: maintenance unless revised  My Physician/PA/NP and I have developed this plan together and I agree to work on the goals and objectives. I understand the treatment goals that were developed for my treatment.  Electronic Signatures: on file (unless signed below)    Mouna Brewster MD 05/14/25  "

## 2025-05-14 NOTE — ASSESSMENT & PLAN NOTE
Mood remains stable    Continue Depakote  mg every evening to help with mood stabilization  Continue Risperdal 1 mg every evening to help with mood  Orders:    divalproex sodium (DEPAKOTE ER) 500 mg 24 hr tablet; Take 1 tablet (500 mg total) by mouth every evening    risperiDONE (RisperDAL) 1 mg tablet; Take 1 tablet (1 mg total) by mouth every evening

## 2025-05-19 DIAGNOSIS — I48.91 ATRIAL FIBRILLATION WITH RVR (HCC): ICD-10-CM

## 2025-05-19 RX ORDER — APIXABAN 5 MG/1
5 TABLET, FILM COATED ORAL 2 TIMES DAILY
Qty: 180 TABLET | Refills: 1 | Status: SHIPPED | OUTPATIENT
Start: 2025-05-19

## 2025-05-22 ENCOUNTER — TELEPHONE (OUTPATIENT)
Age: 70
End: 2025-05-22

## 2025-05-22 ENCOUNTER — NURSE TRIAGE (OUTPATIENT)
Age: 70
End: 2025-05-22

## 2025-05-22 NOTE — TELEPHONE ENCOUNTER
Patient called in to check with Primary Care Provider if it is alright for patient to take Biotin 1000 mcg and the label indicates it could interfere with lab tests. Patient confirms is currently taking the medication for the past couple weeks on advice from her podiatrist, but when seeing the notice on the label, she thought to check in with Primary Care Provider. Please follow up with patient for provider response.

## 2025-05-22 NOTE — TELEPHONE ENCOUNTER
Patient wants to know if it is ok to take Biotin. She is currently taking it and noticed that it states it might have an impact on her lab results.

## 2025-05-22 NOTE — TELEPHONE ENCOUNTER
"REASON FOR CONVERSATION: medication question   Pt called to find out if it is safe to take Biotin with her medications. She takes Eliquis,   Metoprolol and Furosemide. Please advised     DISPOSITION: Discuss with Provider and Call Back Within 24-48 hrs       Answer Assessment - Initial Assessment Questions  1. NAME of MEDICINE: \"What medicine(s) are you calling about?\"      Biotin   2. QUESTION: \"What is your question?\" (e.g., double dose of medicine, side effect)      Pt would like to know if she can take this with her other medications and she read that it can interfere with lab tests   3. PRESCRIBER: \"Who prescribed the medicine?\" Reason: if prescribed by specialist, call should be referred to that group.      Catrachito Lewis    Protocols used: Medication Question Call-Adult-[place]    "

## 2025-05-22 NOTE — TELEPHONE ENCOUNTER
It would not impact Depakote level. If Nitza has concerns about other labs, she should review it with PCP. Please discuss that with her.

## 2025-05-23 NOTE — TELEPHONE ENCOUNTER
Called Nitza and informed her that the Biotin will not affect her Depate level as far as her lab work goes.  However as far as any other lab work goes, she will need to call her PCP.

## 2025-06-04 ENCOUNTER — OFFICE VISIT (OUTPATIENT)
Dept: FAMILY MEDICINE CLINIC | Facility: CLINIC | Age: 70
End: 2025-06-04
Payer: MEDICARE

## 2025-06-04 VITALS
DIASTOLIC BLOOD PRESSURE: 70 MMHG | TEMPERATURE: 98.2 F | HEIGHT: 67 IN | HEART RATE: 105 BPM | BODY MASS INDEX: 35.41 KG/M2 | OXYGEN SATURATION: 96 % | SYSTOLIC BLOOD PRESSURE: 112 MMHG | WEIGHT: 225.6 LBS

## 2025-06-04 DIAGNOSIS — R73.03 PREDIABETES: ICD-10-CM

## 2025-06-04 DIAGNOSIS — E66.01 OBESITY, MORBID (HCC): ICD-10-CM

## 2025-06-04 DIAGNOSIS — Z00.00 MEDICARE ANNUAL WELLNESS VISIT, SUBSEQUENT: Primary | ICD-10-CM

## 2025-06-04 DIAGNOSIS — Z12.31 ENCOUNTER FOR SCREENING MAMMOGRAM FOR MALIGNANT NEOPLASM OF BREAST: ICD-10-CM

## 2025-06-04 DIAGNOSIS — I48.19 OTHER PERSISTENT ATRIAL FIBRILLATION (HCC): ICD-10-CM

## 2025-06-04 DIAGNOSIS — G47.33 OSA ON CPAP: ICD-10-CM

## 2025-06-04 DIAGNOSIS — Z12.11 SCREEN FOR COLON CANCER: ICD-10-CM

## 2025-06-04 DIAGNOSIS — F31.78 BIPOLAR I DISORDER, MOST RECENT EPISODE MIXED, IN FULL REMISSION (HCC): Chronic | ICD-10-CM

## 2025-06-04 DIAGNOSIS — I50.32 CHRONIC DIASTOLIC CONGESTIVE HEART FAILURE (HCC): ICD-10-CM

## 2025-06-04 DIAGNOSIS — Z13.220 SCREENING FOR LIPID DISORDERS: ICD-10-CM

## 2025-06-04 LAB — SL AMB POCT HEMOGLOBIN AIC: 5.6 (ref ?–6.5)

## 2025-06-04 PROCEDURE — 83036 HEMOGLOBIN GLYCOSYLATED A1C: CPT | Performed by: PHYSICIAN ASSISTANT

## 2025-06-04 PROCEDURE — G0439 PPPS, SUBSEQ VISIT: HCPCS | Performed by: PHYSICIAN ASSISTANT

## 2025-06-04 PROCEDURE — G2211 COMPLEX E/M VISIT ADD ON: HCPCS | Performed by: PHYSICIAN ASSISTANT

## 2025-06-04 PROCEDURE — 99214 OFFICE O/P EST MOD 30 MIN: CPT | Performed by: PHYSICIAN ASSISTANT

## 2025-06-04 NOTE — ASSESSMENT & PLAN NOTE
Wt Readings from Last 3 Encounters:   06/04/25 102 kg (225 lb 9.6 oz)   05/14/25 104 kg (230 lb)   01/29/25 104 kg (230 lb)     Weigh self daily.  Lasix 20 mg twice daily as needed          Orders:    Comprehensive metabolic panel    CBC and differential

## 2025-06-04 NOTE — PROGRESS NOTES
Name: Nitza Vitale      : 1955      MRN: [id number]  Encounter Provider: Lisha Kamara PA-C  Encounter Date: 2025   Encounter department: [facility]  :  Assessment & Plan  Medicare annual wellness visit, subsequent         Other persistent atrial fibrillation (HCC)  Patient will continue her Eliquis and metoprolol.       Chronic diastolic congestive heart failure (HCC)  Wt Readings from Last 3 Encounters:   25 102 kg (225 lb 9.6 oz)   25 104 kg (230 lb)   25 104 kg (230 lb)     Weigh self daily.  Lasix 20 mg twice daily as needed          Orders:    Comprehensive metabolic panel    CBC and differential    DOMENIC on CPAP         Bipolar I disorder, most recent episode mixed, in full remission (HCC)  Currently stable.  Meds per psychiatry       Obesity, morbid (HCC)           Prediabetes  Low-carb low sugar diet.  Hemoglobin A1c is 5.6  Orders:    POCT hemoglobin A1c    Hemoglobin A1C    Encounter for screening mammogram for malignant neoplasm of breast    Orders:    Mammo screening bilateral w 3d and cad; Future    Screen for colon cancer    Orders:    Cologuard    Screening for lipid disorders    Orders:    Lipid panel       Preventive health issues were discussed with patient, and age appropriate screening tests were ordered as noted in patient's After Visit Summary. Personalized health advice and appropriate referrals for health education or preventive services given if needed, as noted in patient's After Visit Summary.    History of Present Illness     Patient presents in the office for follow-up chronic conditions.  Patient has a history of A-fib and congestive heart failure.  He follows with radiology.  She is on Eliquis 5 mg twice daily and metoprolol 50 mg twice daily.  Also takes Lasix 20 mg and potassium as needed.  For obstructive sleep apnea she is on CPAP.  Has bipolar disorder.  She is currently under care of psychiatry.  Currently on Depakote 500 mg,  Cogentin 1 mg twice daily and Risperdal 1 mg.  Abs reviewed with patient showed normal CBC and CMP.  Panel is good.  Check hemoglobin A1c in the office today.       Patient Care Team:  Lisha Kamara PA-C as PCP - General (Family Medicine)    Review of Systems   Constitutional:  Negative for activity change and unexpected weight change.   HENT:  Negative for ear pain and sore throat.    Eyes:  Negative for visual disturbance.   Respiratory:  Negative for cough, shortness of breath and wheezing.    Cardiovascular:  Negative for chest pain and leg swelling.   Gastrointestinal:  Negative for abdominal pain, blood in stool, constipation, diarrhea, nausea and vomiting.   Genitourinary:  Negative for difficulty urinating.   Musculoskeletal:  Negative for arthralgias and myalgias.   Skin:  Negative for rash.   Neurological:  Negative for dizziness, syncope, light-headedness and headaches.   Psychiatric/Behavioral:  Negative for self-injury, sleep disturbance and suicidal ideas. The patient is not nervous/anxious.      Medical History Reviewed by provider this encounter:  Allergies  Meds  Problems       Annual Wellness Visit Questionnaire   Nitza is here for her Subsequent Wellness visit.     Health Risk Assessment:   Patient rates overall health as good. Patient feels that their physical health rating is same. Patient is satisfied with their life. Eyesight was rated as slightly worse. Hearing was rated as same. Patient feels that their emotional and mental health rating is same. Patients states they are never, rarely angry. Patient states they are never, rarely unusually tired/fatigued. Pain experienced in the last 7 days has been some. Patient's pain rating has been 3/10. Patient states that she has experienced no weight loss or gain in last 6 months.     Depression Screening:   PHQ-2 Score: 0      Fall Risk Screening:   In the past year, patient has experienced: no history of falling in past year      Urinary  Incontinence Screening:   Patient has not leaked urine accidently in the last six months.     Home Safety:  Patient does not have trouble with stairs inside or outside of their home. Patient has working smoke alarms and has working carbon monoxide detector. Home safety hazards include: none.     Nutrition:   Current diet is Regular.     Medications:   Patient is currently taking over-the-counter supplements. OTC medications include: see medication list. Patient is able to manage medications.     Activities of Daily Living (ADLs)/Instrumental Activities of Daily Living (IADLs):   Walk and transfer into and out of bed and chair?: Yes  Dress and groom yourself?: Yes    Bathe or shower yourself?: Yes    Feed yourself? Yes  Do your laundry/housekeeping?: Yes  Manage your money, pay your bills and track your expenses?: Yes  Make your own meals?: Yes    Do your own shopping?: Yes    ADL comments:  helps    Previous Hospitalizations:   Any hospitalizations or ED visits within the last 12 months?: No      Advance Care Planning:   Living will: No      Cognitive Screening:   Provider or family/friend/caregiver concerned regarding cognition?: No    Preventive Screenings      Cardiovascular Screening:    General: Screening Current      Diabetes Screening:     General: Screening Current      Breast Cancer Screening:     General: Screening Current      Cervical Cancer Screening:    General: Screening Not Indicated      Osteoporosis Screening:    General: Screening Current      Abdominal Aortic Aneurysm (AAA) Screening:        General: Screening Not Indicated      Lung Cancer Screening:     General: Screening Not Indicated      Hepatitis C Screening:    General: Screening Current    Immunizations:  - Immunizations due: Zoster (Shingrix)    Screening, Brief Intervention, and Referral to Treatment (SBIRT)     Screening  Typical number of drinks in a day: 0  Typical number of drinks in a week: 0  Interpretation: Low risk  "drinking behavior.    Single Item Drug Screening:  How often have you used an illegal drug (including marijuana) or a prescription medication for non-medical reasons in the past year? never    Single Item Drug Screen Score: 0  Interpretation: Negative screen for possible drug use disorder    Social Drivers of Health     Financial Resource Strain: Medium Risk (5/14/2025)    Overall Financial Resource Strain (CARDIA)     Difficulty of Paying Living Expenses: Somewhat hard   Food Insecurity: No Food Insecurity (6/4/2025)    Nursing - Inadequate Food Risk Classification     Worried About Running Out of Food in the Last Year: Never true     Ran Out of Food in the Last Year: Never true   Transportation Needs: No Transportation Needs (6/4/2025)    PRAPARE - Transportation     Lack of Transportation (Medical): No     Lack of Transportation (Non-Medical): No   Housing Stability: Low Risk  (6/4/2025)    Housing Stability Vital Sign     Unable to Pay for Housing in the Last Year: No     Number of Times Moved in the Last Year: 0     Homeless in the Last Year: No   Utilities: Not At Risk (6/4/2025)    [facility] Utilities     Threatened with loss of utilities: No     No results found.    Objective   /70 (BP Location: Right arm, Patient Position: Sitting, Cuff Size: Large)   Pulse 105   Temp 98.2 °F (36.8 °C) (Temporal)   Ht 5' 6.75\" (1.695 m)   Wt 102 kg (225 lb 9.6 oz)   SpO2 96%   Breastfeeding No   BMI 35.60 kg/m²     Physical Exam  Vitals and nursing note reviewed.   Constitutional:       General: She is not in acute distress.     Appearance: She is obese. She is not ill-appearing.   HENT:      Head: Normocephalic and atraumatic.      Right Ear: Tympanic membrane, ear canal and external ear normal.      Left Ear: Tympanic membrane, ear canal and external ear normal.      Mouth/Throat:      Pharynx: No posterior oropharyngeal erythema.     Eyes:      Conjunctiva/sclera: Conjunctivae normal.     Neck:      Vascular: No " carotid bruit.     Cardiovascular:      Rate and Rhythm: Normal rate and regular rhythm.      Heart sounds: No murmur heard.     Comments: Known  afib.  Rhythm  normal .  today    Musculoskeletal:      Right lower leg: Edema present.      Left lower leg: Edema present.   Lymphadenopathy:      Cervical: No cervical adenopathy.     Skin:     General: Skin is dry.     Neurological:      General: No focal deficit present.      Mental Status: She is oriented to person, place, and time.      Motor: Tremor present.      Comments: Thumb.   Psychiatric:         Mood and Affect: Mood normal.         Behavior: Behavior normal.         Thought Content: Thought content normal.         Judgment: Judgment normal.

## 2025-06-04 NOTE — PATIENT INSTRUCTIONS
Medicare Preventive Visit Patient Instructions  Thank you for completing your Welcome to Medicare Visit or Medicare Annual Wellness Visit today. Your next wellness visit will be due in one year (6/5/2026).  The screening/preventive services that you may require over the next 5-10 years are detailed below. Some tests may not apply to you based off risk factors and/or age. Screening tests ordered at today's visit but not completed yet may show as past due. Also, please note that scanned in results may not display below.  Preventive Screenings:  Service Recommendations Previous Testing/Comments   Colorectal Cancer Screening  * Colonoscopy    * Fecal Occult Blood Test (FOBT)/Fecal Immunochemical Test (FIT)  * Fecal DNA/Cologuard Test  * Flexible Sigmoidoscopy Age: 45-75 years old   Colonoscopy: every 10 years (may be performed more frequently if at higher risk)  OR  FOBT/FIT: every 1 year  OR  Cologuard: every 3 years  OR  Sigmoidoscopy: every 5 years  Screening may be recommended earlier than age 45 if at higher risk for colorectal cancer. Also, an individualized decision between you and your healthcare provider will decide whether screening between the ages of 76-85 would be appropriate. Colonoscopy: Not on file  FOBT/FIT: Not on file  Cologuard: Not on file  Sigmoidoscopy: Not on file          Breast Cancer Screening Age: 40+ years old  Frequency: every 1-2 years  Not required if history of left and right mastectomy Mammogram: 11/11/2024    Screening Current   Cervical Cancer Screening Between the ages of 21-29, pap smear recommended once every 3 years.   Between the ages of 30-65, can perform pap smear with HPV co-testing every 5 years.   Recommendations may differ for women with a history of total hysterectomy, cervical cancer, or abnormal pap smears in past. Pap Smear: Not on file    Screening Not Indicated   Hepatitis C Screening Once for adults born between 1945 and 1965  More frequently in patients at high risk  for Hepatitis C Hep C Antibody: 01/22/2015    Screening Current   Diabetes Screening 1-2 times per year if you're at risk for diabetes or have pre-diabetes Fasting glucose: 82 mg/dL (12/23/2024)  A1C: 6.2 % (12/23/2024)  Screening Current   Cholesterol Screening Once every 5 years if you don't have a lipid disorder. May order more often based on risk factors. Lipid panel: 04/08/2025    Screening Current     Other Preventive Screenings Covered by Medicare:  Abdominal Aortic Aneurysm (AAA) Screening: covered once if your at risk. You're considered to be at risk if you have a family history of AAA.  Lung Cancer Screening: covers low dose CT scan once per year if you meet all of the following conditions: (1) Age 55-77; (2) No signs or symptoms of lung cancer; (3) Current smoker or have quit smoking within the last 15 years; (4) You have a tobacco smoking history of at least 20 pack years (packs per day multiplied by number of years you smoked); (5) You get a written order from a healthcare provider.  Glaucoma Screening: covered annually if you're considered high risk: (1) You have diabetes OR (2) Family history of glaucoma OR (3)  aged 50 and older OR (4)  American aged 65 and older  Osteoporosis Screening: covered every 2 years if you meet one of the following conditions: (1) You're estrogen deficient and at risk for osteoporosis based off medical history and other findings; (2) Have a vertebral abnormality; (3) On glucocorticoid therapy for more than 3 months; (4) Have primary hyperparathyroidism; (5) On osteoporosis medications and need to assess response to drug therapy.   Last bone density test (DXA Scan): 11/11/2024.  HIV Screening: covered annually if you're between the age of 15-65. Also covered annually if you are younger than 15 and older than 65 with risk factors for HIV infection. For pregnant patients, it is covered up to 3 times per pregnancy.    Immunizations:  Immunization  Recommendations   Influenza Vaccine Annual influenza vaccination during flu season is recommended for all persons aged >= 6 months who do not have contraindications   Pneumococcal Vaccine   * Pneumococcal conjugate vaccine = PCV13 (Prevnar 13), PCV15 (Vaxneuvance), PCV20 (Prevnar 20)  * Pneumococcal polysaccharide vaccine = PPSV23 (Pneumovax) Adults 19-65 yo with certain risk factors or if 65+ yo  If never received any pneumonia vaccine: recommend Prevnar 20 (PCV20)  Give PCV20 if previously received 1 dose of PCV13 or PPSV23   Hepatitis B Vaccine 3 dose series if at intermediate or high risk (ex: diabetes, end stage renal disease, liver disease)   Respiratory syncytial virus (RSV) Vaccine - COVERED BY MEDICARE PART D  * RSVPreF3 (Arexvy) CDC recommends that adults 60 years of age and older may receive a single dose of RSV vaccine using shared clinical decision-making (SCDM)   Tetanus (Td) Vaccine - COST NOT COVERED BY MEDICARE PART B Following completion of primary series, a booster dose should be given every 10 years to maintain immunity against tetanus. Td may also be given as tetanus wound prophylaxis.   Tdap Vaccine - COST NOT COVERED BY MEDICARE PART B Recommended at least once for all adults. For pregnant patients, recommended with each pregnancy.   Shingles Vaccine (Shingrix) - COST NOT COVERED BY MEDICARE PART B  2 shot series recommended in those 19 years and older who have or will have weakened immune systems or those 50 years and older     Health Maintenance Due:      Topic Date Due   • Colorectal Cancer Screening  12/05/2026 (Originally 12/10/2001)   • Breast Cancer Screening: Mammogram  11/11/2025   • Hepatitis C Screening  Completed     Immunizations Due:      Topic Date Due   • COVID-19 Vaccine (7 - 2024-25 season) 03/25/2025     Advance Directives   What are advance directives?  Advance directives are legal documents that state your wishes and plans for medical care. These plans are made ahead of  time in case you lose your ability to make decisions for yourself. Advance directives can apply to any medical decision, such as the treatments you want, and if you want to donate organs.   What are the types of advance directives?  There are many types of advance directives, and each state has rules about how to use them. You may choose a combination of any of the following:  Living will:  This is a written record of the treatment you want. You can also choose which treatments you do not want, which to limit, and which to stop at a certain time. This includes surgery, medicine, IV fluid, and tube feedings.   Durable power of  for healthcare (DPAHC):  This is a written record that states who you want to make healthcare choices for you when you are unable to make them for yourself. This person, called a proxy, is usually a family member or a friend. You may choose more than 1 proxy.  Do not resuscitate (DNR) order:  A DNR order is used in case your heart stops beating or you stop breathing. It is a request not to have certain forms of treatment, such as CPR. A DNR order may be included in other types of advance directives.  Medical directive:  This covers the care that you want if you are in a coma, near death, or unable to make decisions for yourself. You can list the treatments you want for each condition. Treatment may include pain medicine, surgery, blood transfusions, dialysis, IV or tube feedings, and a ventilator (breathing machine).  Values history:  This document has questions about your views, beliefs, and how you feel and think about life. This information can help others choose the care that you would choose.  Why are advance directives important?  An advance directive helps you control your care. Although spoken wishes may be used, it is better to have your wishes written down. Spoken wishes can be misunderstood, or not followed. Treatments may be given even if you do not want them. An advance  directive may make it easier for your family to make difficult choices about your care.   Weight Management   Why it is important to manage your weight:  Being overweight increases your risk of health conditions such as heart disease, high blood pressure, type 2 diabetes, and certain types of cancer. It can also increase your risk for osteoarthritis, sleep apnea, and other respiratory problems. Aim for a slow, steady weight loss. Even a small amount of weight loss can lower your risk of health problems.  How to lose weight safely:  A safe and healthy way to lose weight is to eat fewer calories and get regular exercise. You can lose up about 1 pound a week by decreasing the number of calories you eat by 500 calories each day.   Healthy meal plan for weight management:  A healthy meal plan includes a variety of foods, contains fewer calories, and helps you stay healthy. A healthy meal plan includes the following:  Eat whole-grain foods more often.  A healthy meal plan should contain fiber. Fiber is the part of grains, fruits, and vegetables that is not broken down by your body. Whole-grain foods are healthy and provide extra fiber in your diet. Some examples of whole-grain foods are whole-wheat breads and pastas, oatmeal, brown rice, and bulgur.  Eat a variety of vegetables every day.  Include dark, leafy greens such as spinach, kale, lee ann greens, and mustard greens. Eat yellow and orange vegetables such as carrots, sweet potatoes, and winter squash.   Eat a variety of fruits every day.  Choose fresh or canned fruit (canned in its own juice or light syrup) instead of juice. Fruit juice has very little or no fiber.  Eat low-fat dairy foods.  Drink fat-free (skim) milk or 1% milk. Eat fat-free yogurt and low-fat cottage cheese. Try low-fat cheeses such as mozzarella and other reduced-fat cheeses.  Choose meat and other protein foods that are low in fat.  Choose beans or other legumes such as split peas or lentils.  Choose fish, skinless poultry (chicken or turkey), or lean cuts of red meat (beef or pork). Before you cook meat or poultry, cut off any visible fat.   Use less fat and oil.  Try baking foods instead of frying them. Add less fat, such as margarine, sour cream, regular salad dressing and mayonnaise to foods. Eat fewer high-fat foods. Some examples of high-fat foods include french fries, doughnuts, ice cream, and cakes.  Eat fewer sweets.  Limit foods and drinks that are high in sugar. This includes candy, cookies, regular soda, and sweetened drinks.  Exercise:  Exercise at least 30 minutes per day on most days of the week. Some examples of exercise include walking, biking, dancing, and swimming. You can also fit in more physical activity by taking the stairs instead of the elevator or parking farther away from stores. Ask your healthcare provider about the best exercise plan for you.    © Copyright [employer] 2018 Information is for End User's use only and may not be sold, redistributed or otherwise used for commercial purposes. All illustrations and images included in CareNotes® are the copyrighted property of [employer].D.A[employer]M., [employer] or [employer]

## 2025-06-04 NOTE — ASSESSMENT & PLAN NOTE
Low-carb low sugar diet.  Hemoglobin A1c is 5.6  Orders:    POCT hemoglobin A1c    Hemoglobin A1C

## 2025-06-10 DIAGNOSIS — G25.9 EXTRAPYRAMIDAL REACTION: Chronic | ICD-10-CM

## 2025-06-10 DIAGNOSIS — F31.78 BIPOLAR I DISORDER, MOST RECENT EPISODE MIXED, IN FULL REMISSION (HCC): Chronic | ICD-10-CM

## 2025-06-10 RX ORDER — RISPERIDONE 1 MG/1
1 TABLET ORAL EVERY EVENING
Qty: 90 TABLET | Refills: 4 | OUTPATIENT
Start: 2025-06-10

## 2025-06-10 RX ORDER — BENZTROPINE MESYLATE 1 MG/1
1 TABLET ORAL 2 TIMES DAILY
Qty: 180 TABLET | Refills: 0 | Status: SHIPPED | OUTPATIENT
Start: 2025-06-10

## 2025-06-16 ENCOUNTER — TELEPHONE (OUTPATIENT)
Age: 70
End: 2025-06-16

## 2025-06-16 NOTE — TELEPHONE ENCOUNTER
Pt calling in stating that she is going for cardiac surgery. Left eye on 7/16 and Right eye on 7/30. She would like to know how long she should hold eliquis?    Please advise

## 2025-06-16 NOTE — TELEPHONE ENCOUNTER
Pt is going for cataract surgery. Advised I have not had a pt who has needed to hold eliquis for that surgery. I will call her Eye doctors office to make sure.

## 2025-06-17 ENCOUNTER — TELEPHONE (OUTPATIENT)
Dept: CARDIOLOGY CLINIC | Facility: CLINIC | Age: 70
End: 2025-06-17

## 2025-06-17 NOTE — TELEPHONE ENCOUNTER
No answer, LMOM for the office to call back to let me now if they have their pt's hold Eliquis for cataract surgery.

## 2025-06-17 NOTE — TELEPHONE ENCOUNTER
Called pt, no answer, LMOM that she can hold Eliquis 2 days prior and that I was unable to talk with anyone at Dr. Muse's office. Office # left on message for any whether questions.

## 2025-06-29 DIAGNOSIS — R60.9 EDEMA: ICD-10-CM

## 2025-06-30 RX ORDER — FUROSEMIDE 20 MG/1
20 TABLET ORAL 2 TIMES DAILY
Qty: 180 TABLET | Refills: 1 | Status: SHIPPED | OUTPATIENT
Start: 2025-06-30

## 2025-06-30 RX ORDER — POTASSIUM CHLORIDE 750 MG/1
10 CAPSULE, EXTENDED RELEASE ORAL 2 TIMES DAILY
Qty: 180 CAPSULE | Refills: 1 | Status: SHIPPED | OUTPATIENT
Start: 2025-06-30

## 2025-07-02 ENCOUNTER — CONSULT (OUTPATIENT)
Dept: FAMILY MEDICINE CLINIC | Facility: CLINIC | Age: 70
End: 2025-07-02
Payer: MEDICARE

## 2025-07-02 VITALS
OXYGEN SATURATION: 98 % | TEMPERATURE: 97.7 F | HEART RATE: 72 BPM | SYSTOLIC BLOOD PRESSURE: 118 MMHG | WEIGHT: 228.6 LBS | HEIGHT: 67 IN | BODY MASS INDEX: 35.88 KG/M2 | DIASTOLIC BLOOD PRESSURE: 70 MMHG

## 2025-07-02 DIAGNOSIS — R60.0 EDEMA OF BOTH LOWER LEGS: ICD-10-CM

## 2025-07-02 DIAGNOSIS — Z01.818 PRE-OP EXAMINATION: ICD-10-CM

## 2025-07-02 DIAGNOSIS — I48.19 OTHER PERSISTENT ATRIAL FIBRILLATION (HCC): Primary | ICD-10-CM

## 2025-07-02 DIAGNOSIS — E66.01 OBESITY, MORBID (HCC): ICD-10-CM

## 2025-07-02 DIAGNOSIS — G47.33 OSA ON CPAP: ICD-10-CM

## 2025-07-02 PROCEDURE — 99214 OFFICE O/P EST MOD 30 MIN: CPT | Performed by: INTERNAL MEDICINE

## 2025-07-02 PROCEDURE — G2211 COMPLEX E/M VISIT ADD ON: HCPCS | Performed by: INTERNAL MEDICINE

## 2025-07-02 NOTE — PROGRESS NOTES
Pre-operative Clearance  Name: Nitza Vitale      : 1955      MRN: [id number]  Encounter Provider: Larissa Benítez MD  Encounter Date: 2025   Encounter department: [facility]    :  Assessment & Plan  Other persistent atrial fibrillation (HCC)  Patient continues to have persistent atrial fibrillation that is rate controlled       DOMENIC on CPAP  She uses CPAP regularly       Edema of both lower legs  She has mild intermittent edema and does take Lasix       Obesity, morbid (HCC)           Pre-op examination  Patient is medically cleared for cataract surgery           Pre-operative Clearance:     Revised Cardiac Risk Index:  RCI RISK CLASS II (1 risk factor, risk of major cardiac complications approximately 1.3%)    Clearance:  Patient is medically optimized (CLEARED) for proposed surgery without any additional cardiac testing.      Medication Instructions:   - Avoid herbs or non-directed vitamins one week prior to surgery    - Avoid aspirin containing medications or non-steroidal anti-inflammatory drugs one week preceding surgery    - May take tylenol for pain up until the night before surgery    - Antiepileptic meds: Continue to take this medication on your normal schedule.  - Antiparkinson meds (ie, carbidopa-levodopa, amantadine, pramipexole, ropinirole): Continue to take this medication on your normal schedule.  - Antipsychotic meds: Continue to take this medication on your normal schedule.  - Beta blockers:  Continue to take this medication on your normal schedule.  - Direct Xa Inhibitor (ie, Eliquis, Xarelto): Stop taking medication 3 days prior to procedure/surgery.  - Diuretics: Continue taking this medication up to the evening before surgery/procedure, but do not take in the morning of the day of surgery/procedure.       History of Present Illness     Pre-Op Examination     Surgery: cataracts   Anticipated Date of Surgery:    Surgeon: MEGGAN Frances     Decreasing vision     Previous  "history of bleeding disorders or clots?: Yes    Previous Anesthesia reaction?: No    Prolonged steroid use in the last 6 months?: No      Assessment of Cardiac Risk:   - Unstable or severe angina or MI in the last 6 weeks or history of stent placement in the last year?: No    - Decompensated heart failure (e.g. New onset heart failure, NYHA  Class IV heart failure, or worsening existing heart failure)?: No    - Significant arrhythmias such as high grade AV block, symptomatic ventricular arrhythmia, newly recognized ventricular tachycardia, supraventricular tachycardia with resting heart rate >100, or symptomatic bradycardia?: No    - Severe heart valve disease including aortic stenosis or symptomatic mitral stenosis?: No      Pre-operative Risk Factors:  - Elevated-risk surgery: No    - History of cerebrovascular disease: No    - History of ischemic heart disease: No    - History of congestive heart failure: Yes    - Pre-operative treatment with insulin: No    - Pre-operative creatinine >2 mg/dL: No      Medications of Perioperative Concern:    Anti-coagulants (Coumadin, Xarelto, Pradaxa, Eliquis, Lixiana)    Review of Systems   Constitutional: Negative.    HENT:  Positive for dental problem (partial upper dentures).    Eyes:  Positive for visual disturbance.   Respiratory: Negative.  Negative for cough, chest tightness and wheezing.    Cardiovascular:  Positive for palpitations (rare) and leg swelling (occ).   Endocrine: Negative.    Genitourinary: Negative.    Musculoskeletal: Negative.    Skin: Negative.    Allergic/Immunologic: Negative for immunocompromised state.   Neurological: Negative.    Hematological:  Bruises/bleeds easily.   Psychiatric/Behavioral: Negative.       Past Medical History   Past Medical History[1]  Past Surgical History[2]  Family History[3]  Social History[4]  Medications[5]  Allergies   Allergen Reactions    Levaquin [Levofloxacin]      \"heavy legs\"    Ampicillin Rash     Category: " "Allergy;     Clindamycin Rash     Category: Allergy;      Objective   /70 (BP Location: Left arm, Patient Position: Sitting, Cuff Size: Large)   Pulse 72   Temp 97.7 °F (36.5 °C) (Temporal)   Ht 5' 6.75\" (1.695 m)   Wt 104 kg (228 lb 9.6 oz)   SpO2 98%   BMI 36.07 kg/m²     Physical Exam  Vitals and nursing note reviewed.   Constitutional:       General: She is not in acute distress.     Appearance: Normal appearance. She is well-developed. She is obese.   HENT:      Head: Normocephalic and atraumatic.      Right Ear: Tympanic membrane normal.      Left Ear: Tympanic membrane normal.      Nose: Nose normal. No congestion.      Mouth/Throat:      Mouth: Mucous membranes are moist.     Eyes:      Extraocular Movements: Extraocular movements intact.      Conjunctiva/sclera: Conjunctivae normal.      Pupils: Pupils are equal, round, and reactive to light.     Neck:      Vascular: No carotid bruit.     Cardiovascular:      Rate and Rhythm: Normal rate. Rhythm irregular.      Heart sounds: No murmur heard.  Pulmonary:      Effort: Pulmonary effort is normal. No respiratory distress.      Breath sounds: Normal breath sounds.   Abdominal:      Palpations: Abdomen is soft.      Tenderness: There is no abdominal tenderness.     Musculoskeletal:         General: No swelling.      Cervical back: Neck supple.      Right lower leg: No edema.      Left lower leg: No edema.   Lymphadenopathy:      Cervical: No cervical adenopathy.     Skin:     General: Skin is warm and dry.      Capillary Refill: Capillary refill takes less than 2 seconds.      Findings: No rash.     Neurological:      General: No focal deficit present.      Mental Status: She is alert and oriented to person, place, and time. Mental status is at baseline.     Psychiatric:         Mood and Affect: Mood normal.         Behavior: Behavior normal.         Thought Content: Thought content normal.         Judgment: Judgment normal.         Larissa Benítez " MD         [1]   Past Medical History:  Diagnosis Date    Atrial fibrillation (HCC) 2019    Bipolar disorder (HCC)     bipolar    Cataracts, bilateral     CHF (congestive heart failure) (HCC)     Leg edema     Prediabetes     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    Sleep apnea     Varicose veins of legs    [2]   Past Surgical History:  Procedure Laterality Date    CARDIOVERSION       SECTION     [3]   Family History  Problem Relation Name Age of Onset    Stroke Mother          CVA    Heart failure Mother      Diabetes Mother      Hypertension Mother      Thyroid disease Mother      Gallbladder disease Mother          gallstones    Diabetes Maternal Grandmother      No Known Problems Maternal Grandfather      No Known Problems Paternal Grandmother      No Known Problems Paternal Grandfather      Psychiatric Illness Maternal Aunt      Breast cancer Maternal Aunt      No Known Problems Maternal Aunt      No Known Problems Maternal Aunt      No Known Problems Brother      No Known Problems Brother      No Known Problems Brother      No Known Problems Brother      No Known Problems Brother      No Known Problems Brother      No Known Problems Brother      No Known Problems Brother      Breast cancer Cousin      Cancer Cousin      Substance Abuse Neg Hx      Suicide Attempts Neg Hx     [4]   Social History  Tobacco Use    Smoking status: Former     Current packs/day: 0.00     Average packs/day: 1 pack/day for 20.0 years (20.0 ttl pk-yrs)     Types: Cigarettes     Start date:      Quit date:      Years since quittin.5     Passive exposure: Past    Smokeless tobacco: Never   Vaping Use    Vaping status: Never Used   Substance and Sexual Activity    Alcohol use: No    Drug use: No    Sexual activity: Yes     Partners: Male   [5]   Current Outpatient Medications on File Prior to Visit   Medication Sig    acetaminophen (TYLENOL) 325 mg tablet Take 650 mg by mouth if needed for mild pain    apixaban  (Eliquis) 5 mg take 1 tablet by mouth twice a day    ascorbic acid (VITAMIN C) 500 mg tablet Take 500 mg by mouth in the morning.    benztropine (COGENTIN) 1 mg tablet take 1 tablet by mouth twice a day    divalproex sodium (DEPAKOTE ER) 500 mg 24 hr tablet Take 1 tablet (500 mg total) by mouth every evening    furosemide (LASIX) 20 mg tablet take 1 tablet by mouth twice a day    metoprolol tartrate (LOPRESSOR) 50 mg tablet take 1 tablet by mouth every 12 hours    Multiple Vitamin (MULTI-VITAMIN DAILY) TABS Take 1 tablet by mouth in the morning.    potassium chloride (MICRO-K) 10 MEQ CR capsule take 1 capsule by mouth twice a day    risperiDONE (RisperDAL) 1 mg tablet Take 1 tablet (1 mg total) by mouth every evening

## 2025-07-02 NOTE — ASSESSMENT & PLAN NOTE
{If prescribing weight loss medication, click here to fill out prior auth smartform and then hit F2 with this smartlist to insert prior auth documentation (Optional):[id number]}

## 2025-07-08 ENCOUNTER — TELEPHONE (OUTPATIENT)
Age: 70
End: 2025-07-08

## 2025-07-08 NOTE — TELEPHONE ENCOUNTER
Pt. Called and said the form for her surgeon was not received.  Can you please refax it to Dr. Slade Choudhary today.  They are waiting for it.    She said the Fax number is on the form.  Ty

## 2025-07-13 DIAGNOSIS — I48.91 ATRIAL FIBRILLATION WITH RVR (HCC): ICD-10-CM

## 2025-07-14 RX ORDER — METOPROLOL TARTRATE 50 MG
50 TABLET ORAL 2 TIMES DAILY
Qty: 180 TABLET | Refills: 1 | Status: SHIPPED | OUTPATIENT
Start: 2025-07-14

## 2025-07-25 ENCOUNTER — CONSULT (OUTPATIENT)
Dept: FAMILY MEDICINE CLINIC | Facility: CLINIC | Age: 70
End: 2025-07-25
Payer: MEDICARE

## 2025-07-25 VITALS
SYSTOLIC BLOOD PRESSURE: 112 MMHG | DIASTOLIC BLOOD PRESSURE: 70 MMHG | HEART RATE: 61 BPM | OXYGEN SATURATION: 95 % | HEIGHT: 67 IN | WEIGHT: 231.2 LBS | TEMPERATURE: 98.2 F | BODY MASS INDEX: 36.29 KG/M2

## 2025-07-25 DIAGNOSIS — H26.9 CATARACT OF BOTH EYES, UNSPECIFIED CATARACT TYPE: ICD-10-CM

## 2025-07-25 DIAGNOSIS — Z01.818 PRE-OP EXAMINATION: Primary | ICD-10-CM

## 2025-07-25 PROCEDURE — G2211 COMPLEX E/M VISIT ADD ON: HCPCS | Performed by: INTERNAL MEDICINE

## 2025-07-25 PROCEDURE — 99213 OFFICE O/P EST LOW 20 MIN: CPT | Performed by: INTERNAL MEDICINE

## 2025-07-25 NOTE — PROGRESS NOTES
Pre-operative Clearance  Name: Nitza Vitale      : 1955      MRN: [id number]  Encounter Provider: Larissa Benítez MD  Encounter Date: 2025   Encounter department: [facility]    :  Assessment & Plan  Pre-op examination         Cataract of both eyes, unspecified cataract type             Pre-operative Clearance:     Revised Cardiac Risk Index:  RCI RISK CLASS II (1 risk factor, risk of major cardiac complications approximately 1.3%)    Clearance:  Patient is medically optimized (CLEARED) for proposed surgery without any additional cardiac testing.      Medication Instructions:   - Avoid herbs or non-directed vitamins one week prior to surgery    - Avoid aspirin containing medications or non-steroidal anti-inflammatory drugs one week preceding surgery    - May take tylenol for pain up until the night before surgery    - Antiepileptic meds: continue  - Antiparkinson meds (ie, carbidopa-levodopa, amantadine, pramipexole, ropinirole): Continue to take this medication on your normal schedule.  - Antipsychotic meds: Continue to take this medication on your normal schedule.  - Beta blockers:  Continue to take this medication on your normal schedule.  - Direct Xa Inhibitor (ie, Eliquis, Xarelto): Stop taking medication 3 days prior to procedure/surgery.  - Diuretics: Continue taking this medication up to the evening before surgery/procedure, but do not take in the morning of the day of surgery/procedure.       History of Present Illness     Pre-Op Examination     Surgery: cornea surgury   Anticipated Date of Surgery:    Surgeon: [facility]     Decreased vision     Previous history of bleeding disorders or clots?: No    Previous Anesthesia reaction?: No    Prolonged steroid use in the last 6 months?: No      Assessment of Cardiac Risk:   - Unstable or severe angina or MI in the last 6 weeks or history of stent placement in the last year?: No    - Decompensated heart failure (e.g. New  "onset heart failure, NYHA  Class IV heart failure, or worsening existing heart failure)?: No    - Significant arrhythmias such as high grade AV block, symptomatic ventricular arrhythmia, newly recognized ventricular tachycardia, supraventricular tachycardia with resting heart rate >100, or symptomatic bradycardia?: No    - Severe heart valve disease including aortic stenosis or symptomatic mitral stenosis?: No      Pre-operative Risk Factors:  - Elevated-risk surgery: No    - History of cerebrovascular disease: No    - History of ischemic heart disease: No    - History of congestive heart failure: Yes    - Pre-operative treatment with insulin: No    - Pre-operative creatinine >2 mg/dL: No      Medications of Perioperative Concern:    Anti-coagulants (Coumadin, Xarelto, Pradaxa, Eliquis, Lixiana)    Review of Systems   Constitutional:  Negative for chills and fever.   HENT:  Negative for ear pain and sore throat.    Eyes:  Positive for visual disturbance. Negative for pain.   Respiratory:  Negative for cough and shortness of breath.    Cardiovascular:  Negative for chest pain, palpitations and leg swelling.   Gastrointestinal:  Negative for abdominal pain and vomiting.   Genitourinary:  Negative for dysuria and hematuria.   Musculoskeletal:  Negative for arthralgias and back pain.   Skin:  Negative for color change and rash.   Allergic/Immunologic: Negative for immunocompromised state.   Neurological:  Negative for seizures and syncope.   Hematological:  Bruises/bleeds easily.   Psychiatric/Behavioral: Negative.     All other systems reviewed and are negative.    Past Medical History   Past Medical History[1]  Past Surgical History[2]  Family History[3]  Social History[4]  Medications[5]  Allergies   Allergen Reactions    Levaquin [Levofloxacin]      \"heavy legs\"    Ampicillin Rash     Category: Allergy;     Clindamycin Rash     Category: Allergy;      Objective   /70 (BP Location: Right arm, Patient Position: " "Sitting, Cuff Size: Large)   Pulse 61   Temp 98.2 °F (36.8 °C) (Temporal)   Ht 5' 6.75\" (1.695 m)   Wt 105 kg (231 lb 3.2 oz)   SpO2 95%   BMI 36.48 kg/m²     Physical Exam  Constitutional:       Appearance: She is obese.   HENT:      Nose: Nose normal.     Eyes:      Extraocular Movements: Extraocular movements intact.      Pupils: Pupils are equal, round, and reactive to light.       Cardiovascular:      Rate and Rhythm: Rhythm irregular.      Pulses: Normal pulses.      Heart sounds: Normal heart sounds.   Pulmonary:      Effort: Pulmonary effort is normal.      Breath sounds: Normal breath sounds.     Musculoskeletal:      Cervical back: Normal range of motion and neck supple.      Right lower leg: Edema present.      Left lower leg: Edema present.      Comments: Chronic edema     Skin:     General: Skin is warm and dry.      Capillary Refill: Capillary refill takes less than 2 seconds.      Findings: No rash.     Neurological:      General: No focal deficit present.      Mental Status: She is alert and oriented to person, place, and time. Mental status is at baseline.      Cranial Nerves: No cranial nerve deficit.         Larissa Benítez MD       [1]   Past Medical History:  Diagnosis Date    Allergic     Medicines on file    Atrial fibrillation ([facility]) 2019    Bipolar disorder ([facility])     bipolar    Cataracts, bilateral     CHF (congestive heart failure) ([facility])     Headache(784.0)     Leg edema     Obesity     heavier    Prediabetes     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    Sleep apnea     Varicose veins of legs     Visual impairment     glasses   [2]   Past Surgical History:  Procedure Laterality Date    CARDIOVERSION       SECTION     [3]   Family History  Problem Relation Name Age of Onset    Stroke Mother Dorene         congestive heart failure    Heart failure Mother Dorene     Diabetes Mother Dorene     Hypertension Mother Dorene     Thyroid disease Mother Dorene     Gallbladder disease Mother Dorene  "        gallstones    Early death Father Arik Isabel          56-57 yrs old aprox    Diabetes Maternal Grandmother      No Known Problems Maternal Grandfather      No Known Problems Paternal Grandmother      No Known Problems Paternal Grandfather      Psychiatric Illness Maternal Aunt aunt may     Breast cancer Maternal Aunt aunt may     No Known Problems Maternal Aunt      No Known Problems Maternal Aunt      No Known Problems Brother      No Known Problems Brother      No Known Problems Brother      No Known Problems Brother      No Known Problems Brother      No Known Problems Brother      No Known Problems Brother      No Known Problems Brother      Breast cancer Cousin      Cancer Cousin      Substance Abuse Neg Hx      Suicide Attempts Neg Hx     [4]   Social History  Tobacco Use    Smoking status: Former     Current packs/day: 0.00     Average packs/day: 1 pack/day for 20.0 years (20.0 ttl pk-yrs)     Types: Cigarettes     Start date:      Quit date:      Years since quittin.5     Passive exposure: Past    Smokeless tobacco: Never   Vaping Use    Vaping status: Never Used   Substance and Sexual Activity    Alcohol use: No    Drug use: No    Sexual activity: Yes     Partners: Male   [5]   Current Outpatient Medications on File Prior to Visit   Medication Sig    acetaminophen (TYLENOL) 325 mg tablet Take 650 mg by mouth if needed for mild pain    apixaban (Eliquis) 5 mg take 1 tablet by mouth twice a day    ascorbic acid (VITAMIN C) 500 mg tablet Take 500 mg by mouth in the morning.    benztropine (COGENTIN) 1 mg tablet take 1 tablet by mouth twice a day    divalproex sodium (DEPAKOTE ER) 500 mg 24 hr tablet Take 1 tablet (500 mg total) by mouth every evening    furosemide (LASIX) 20 mg tablet take 1 tablet by mouth twice a day    metoprolol tartrate (LOPRESSOR) 50 mg tablet take 1 tablet by mouth every 12 hours    Multiple Vitamin (MULTI-VITAMIN DAILY) TABS Take 1 tablet by mouth in the morning.     potassium chloride (MICRO-K) 10 MEQ CR capsule take 1 capsule by mouth twice a day    risperiDONE (RisperDAL) 1 mg tablet Take 1 tablet (1 mg total) by mouth every evening

## 2025-07-28 ENCOUNTER — TELEPHONE (OUTPATIENT)
Age: 70
End: 2025-07-28

## 2025-08-05 ENCOUNTER — OFFICE VISIT (OUTPATIENT)
Dept: CARDIOLOGY CLINIC | Facility: MEDICAL CENTER | Age: 70
End: 2025-08-05
Payer: MEDICARE

## 2025-08-05 VITALS
HEART RATE: 93 BPM | DIASTOLIC BLOOD PRESSURE: 72 MMHG | SYSTOLIC BLOOD PRESSURE: 118 MMHG | WEIGHT: 227 LBS | HEIGHT: 67 IN | OXYGEN SATURATION: 97 % | BODY MASS INDEX: 35.63 KG/M2

## 2025-08-05 DIAGNOSIS — I50.32 CHRONIC DIASTOLIC CONGESTIVE HEART FAILURE (HCC): ICD-10-CM

## 2025-08-05 DIAGNOSIS — I48.19 OTHER PERSISTENT ATRIAL FIBRILLATION (HCC): Primary | ICD-10-CM

## 2025-08-05 DIAGNOSIS — R73.03 PREDIABETES: ICD-10-CM

## 2025-08-05 DIAGNOSIS — Z00.00 HEALTHCARE MAINTENANCE: ICD-10-CM

## 2025-08-05 DIAGNOSIS — R60.0 EDEMA OF BOTH LOWER LEGS: ICD-10-CM

## 2025-08-05 PROCEDURE — 99214 OFFICE O/P EST MOD 30 MIN: CPT | Performed by: INTERNAL MEDICINE

## 2025-08-17 LAB — COLOGUARD RESULT REPORTABLE: POSITIVE

## 2025-08-18 ENCOUNTER — RESULTS FOLLOW-UP (OUTPATIENT)
Dept: FAMILY MEDICINE CLINIC | Facility: CLINIC | Age: 70
End: 2025-08-18

## 2025-08-18 DIAGNOSIS — R19.5 POSITIVE COLORECTAL CANCER SCREENING USING COLOGUARD TEST: Primary | ICD-10-CM

## 2025-08-19 ENCOUNTER — TELEPHONE (OUTPATIENT)
Age: 70
End: 2025-08-19

## 2025-08-20 ENCOUNTER — TELEPHONE (OUTPATIENT)
Dept: GASTROENTEROLOGY | Facility: AMBULARY SURGERY CENTER | Age: 70
End: 2025-08-20